# Patient Record
Sex: FEMALE | Race: BLACK OR AFRICAN AMERICAN | NOT HISPANIC OR LATINO | Employment: OTHER | URBAN - METROPOLITAN AREA
[De-identification: names, ages, dates, MRNs, and addresses within clinical notes are randomized per-mention and may not be internally consistent; named-entity substitution may affect disease eponyms.]

---

## 2022-08-20 ENCOUNTER — NURSE TRIAGE (OUTPATIENT)
Dept: ADMINISTRATIVE | Facility: CLINIC | Age: 52
End: 2022-08-20

## 2022-08-21 ENCOUNTER — OFFICE VISIT (OUTPATIENT)
Dept: URGENT CARE | Facility: CLINIC | Age: 52
End: 2022-08-21
Payer: OTHER GOVERNMENT

## 2022-08-21 ENCOUNTER — TELEPHONE (OUTPATIENT)
Dept: URGENT CARE | Facility: CLINIC | Age: 52
End: 2022-08-21

## 2022-08-21 VITALS
HEIGHT: 64 IN | HEART RATE: 84 BPM | BODY MASS INDEX: 33.29 KG/M2 | WEIGHT: 195 LBS | OXYGEN SATURATION: 97 % | SYSTOLIC BLOOD PRESSURE: 128 MMHG | TEMPERATURE: 99 F | RESPIRATION RATE: 18 BRPM | DIASTOLIC BLOOD PRESSURE: 81 MMHG

## 2022-08-21 DIAGNOSIS — Z72.0 TOBACCO USE: ICD-10-CM

## 2022-08-21 DIAGNOSIS — F17.200 NEEDS SMOKING CESSATION EDUCATION: ICD-10-CM

## 2022-08-21 DIAGNOSIS — M25.562 ACUTE PAIN OF LEFT KNEE: Primary | ICD-10-CM

## 2022-08-21 PROCEDURE — 99203 OFFICE O/P NEW LOW 30 MIN: CPT | Mod: ,,, | Performed by: FAMILY MEDICINE

## 2022-08-21 PROCEDURE — 99203 PR OFFICE/OUTPT VISIT, NEW, LEVL III, 30-44 MIN: ICD-10-PCS | Mod: ,,, | Performed by: FAMILY MEDICINE

## 2022-08-21 RX ORDER — BUSPIRONE HYDROCHLORIDE 30 MG/1
30 TABLET ORAL 2 TIMES DAILY
COMMUNITY

## 2022-08-21 RX ORDER — ROSUVASTATIN CALCIUM 10 MG/1
10 TABLET, COATED ORAL DAILY
COMMUNITY

## 2022-08-21 RX ORDER — CHOLECALCIFEROL (VITAMIN D3) 25 MCG
1000 TABLET ORAL DAILY
COMMUNITY

## 2022-08-21 RX ORDER — RAMELTEON 8 MG/1
8 TABLET ORAL DAILY
COMMUNITY

## 2022-08-21 RX ORDER — MONTELUKAST SODIUM 10 MG/1
10 TABLET ORAL DAILY
COMMUNITY

## 2022-08-21 RX ORDER — SERTRALINE HYDROCHLORIDE 200 MG/1
200 CAPSULE ORAL DAILY
COMMUNITY

## 2022-08-21 NOTE — PATIENT INSTRUCTIONS
Discussed the physical finding, concerns of contusion.  Reviewed the x-ray, no concerns of acute findings.  Except possible small effusion.  Continue is 3 to 4 times a day, knee brace.  Activities as tolerated.  Aleve or ibuprofen for pain.  Call or return to clinic for any questions.  Follow-up with primary MD    Smoking cessation education

## 2022-08-21 NOTE — TELEPHONE ENCOUNTER
Reason for Disposition   Followed a leg injury   [1] High-risk adult (e.g., age > 60 years, osteoporosis, chronic steroid use) AND [2] limping    Additional Information   Negative: Looks like a broken bone or dislocated joint (e.g., crooked or deformed)   Negative: Sounds like a life-threatening emergency to the triager   Negative: Serious injury with multiple fractures (broken bones)   Negative: [1] Major bleeding (e.g., actively dripping or spurting) AND [2] can't be stopped   Negative: Bullet wound, stabbed by knife, or other serious penetrating wound   Negative: Looks like a dislocated joint (crooked or deformed)   Negative: Can't stand (bear weight) or walk   Negative: Sounds like a life-threatening emergency to the triager   Negative: Wound looks infected   Negative: Leg pain from overuse (e.g., sports, running, physical work)   Negative: Leg pain not from an injury   Negative: Injury mainly to the hip   Negative: Injury mainly to knee   Negative: Injury mainly to foot or ankle   Negative: Skin is split open or gaping (or length > 1/2 inch or 12 mm)   Negative: [1] Bleeding AND [2] won't stop after 10 minutes of direct pressure (using correct technique)   Negative: [1] Dirt in the wound AND [2] not removed with 15 minutes of scrubbing   Negative: Sounds like a serious injury to the triager   Negative: [1] SEVERE pain (e.g. excruciating)  AND [2] not improved 2 hours after pain medicine/ice packs   Negative: Suspicious history for the injury   Negative: Large swelling or bruise > 2 inches (5 cm)    Protocols used: LEG PAIN-A-AH, LEG INJURY-A-  pt called re had L TKR in April. pt states thigh to calf having throbbing pain x 3 days. pt rates pain 6.  Pt admits she hit the front of her thigh on a chair two weeks ago. No swelling or bruising. rec to see dr within 24 hours. Pt agrees. Offered protocol advice. rec ED if sx worsen. Call back with questions

## 2022-08-21 NOTE — PROGRESS NOTES
"Subjective:       Patient ID: Brenden Hamm is a 52 y.o. female.    Vitals:  height is 5' 4" (1.626 m) and weight is 88.5 kg (195 lb). Her temperature is 98.9 °F (37.2 °C). Her blood pressure is 128/81 and her pulse is 84. Her respiration is 18 and oxygen saturation is 97%.     Chief Complaint: Knee Pain (Knee replacement in April, 3 wks ago hit top of left knee on chair. )    HPI:  52-year-old female present to clinic with concerns of hit fall mainly on left knee 2 weeks ago.  Full weight-bearing, denies tingling numbness or weakness to lower extremities.  States knee replacement left knee April 27th.  Patient is from [place].  Recently moved to [place].  Appears concerned with ongoing discomfort left knee anteriorly and superiorly    ROS  :  Constitutional : No fever, no fatigue  Neck : Negative except HPI  Respiratory : No shortness of breath, no wheezing  Cardiovascular : No chest pain  Musculoskeletal : Negative except HPI  Integumentary : No rash, no abnormal lesion  Neurological : Negative for tingling numbness and weakness  Objective:      Physical Exam    General : Alert and oriented, No apparent distress, Afebrile  Neck -: supple, Non Tender  Respiratory :Lungs are clear to auscultate, Breath sounds are equal  Cardiovascular : Normal rate, normal volume pulse bilateral  Musculoskeletal :  Left knee visible surgical scar consistent with knee replacement.  No visible bruising, mild foot less anterior knee superiorly.  No point tenderness.  Knee range of motion present.  Integumentary : Warm, Dry   Neurologic : Alert and Oriented X 4, sensations intact, motor intact  Assessment:       1. Acute pain of left knee          Plan:       Discussed the physical finding, concerns of contusion.  Reviewed the x-ray, no concerns of acute findings.  Except possible small effusion.  Continue is 3 to 4 times a day, knee brace.  Activities as tolerated.  Aleve or ibuprofen for pain.  Call or return to clinic for any " questions.  Follow-up with primary MD    Acute pain of left knee  -     X-Ray Knee 3 View Left; Future; Expected date: 08/21/2022

## 2022-08-24 DIAGNOSIS — Z01.89 ENCOUNTER FOR OTHER SPECIFIED SPECIAL EXAMINATIONS: Primary | ICD-10-CM

## 2022-09-01 ENCOUNTER — HOSPITAL ENCOUNTER (OUTPATIENT)
Dept: RADIOLOGY | Facility: HOSPITAL | Age: 52
Discharge: HOME | End: 2022-09-01
Attending: FAMILY MEDICINE
Payer: OTHER GOVERNMENT

## 2022-09-01 DIAGNOSIS — Z01.89 ENCOUNTER FOR OTHER SPECIFIED SPECIAL EXAMINATIONS: ICD-10-CM

## 2022-09-01 PROCEDURE — 77067 SCR MAMMO BI INCL CAD: CPT | Mod: TC

## 2022-09-01 PROCEDURE — 77067 MAMMO DIGITAL SCREENING BILAT WITH TOMO: ICD-10-PCS | Mod: 26,,, | Performed by: STUDENT IN AN ORGANIZED HEALTH CARE EDUCATION/TRAINING PROGRAM

## 2022-09-01 PROCEDURE — 77063 MAMMO DIGITAL SCREENING BILAT WITH TOMO: ICD-10-PCS | Mod: 26,,, | Performed by: STUDENT IN AN ORGANIZED HEALTH CARE EDUCATION/TRAINING PROGRAM

## 2022-09-01 PROCEDURE — 77063 BREAST TOMOSYNTHESIS BI: CPT | Mod: 26,,, | Performed by: STUDENT IN AN ORGANIZED HEALTH CARE EDUCATION/TRAINING PROGRAM

## 2022-09-01 PROCEDURE — 77067 SCR MAMMO BI INCL CAD: CPT | Mod: 26,,, | Performed by: STUDENT IN AN ORGANIZED HEALTH CARE EDUCATION/TRAINING PROGRAM

## 2022-11-16 DIAGNOSIS — M54.41 BILATERAL LOW BACK PAIN WITH BILATERAL SCIATICA: Primary | ICD-10-CM

## 2022-11-16 DIAGNOSIS — M54.42 BILATERAL LOW BACK PAIN WITH BILATERAL SCIATICA: Primary | ICD-10-CM

## 2022-11-16 DIAGNOSIS — M47.817 SPONDYLOSIS OF LUMBOSACRAL SPINE WITHOUT MYELOPATHY: ICD-10-CM

## 2023-01-30 ENCOUNTER — OFFICE VISIT (OUTPATIENT)
Dept: ORTHOPEDIC SURGERY | Facility: CLINIC | Age: 53
End: 2023-01-30
Payer: MEDICARE

## 2023-01-30 ENCOUNTER — HOSPITAL ENCOUNTER (OUTPATIENT)
Dept: RADIOLOGY | Facility: CLINIC | Age: 53
Discharge: HOME | End: 2023-01-30
Attending: ORTHOPAEDIC SURGERY
Payer: OTHER GOVERNMENT

## 2023-01-30 DIAGNOSIS — M54.42 BILATERAL LOW BACK PAIN WITH BILATERAL SCIATICA: ICD-10-CM

## 2023-01-30 DIAGNOSIS — M54.41 BILATERAL LOW BACK PAIN WITH BILATERAL SCIATICA: ICD-10-CM

## 2023-01-30 DIAGNOSIS — M47.817 SPONDYLOSIS OF LUMBOSACRAL SPINE WITHOUT MYELOPATHY: ICD-10-CM

## 2023-01-30 DIAGNOSIS — M51.36 DDD (DEGENERATIVE DISC DISEASE), LUMBAR: ICD-10-CM

## 2023-01-30 DIAGNOSIS — M54.9 DORSALGIA, UNSPECIFIED: Primary | ICD-10-CM

## 2023-01-30 PROCEDURE — 72110 XR LUMBAR SPINE AP AND LAT WITH FLEX/EXT: ICD-10-PCS | Mod: ,,, | Performed by: ORTHOPAEDIC SURGERY

## 2023-01-30 PROCEDURE — 99204 OFFICE O/P NEW MOD 45 MIN: CPT | Mod: S$GLB,,, | Performed by: ORTHOPAEDIC SURGERY

## 2023-01-30 PROCEDURE — 99204 PR OFFICE/OUTPT VISIT, NEW, LEVL IV, 45-59 MIN: ICD-10-PCS | Mod: S$GLB,,, | Performed by: ORTHOPAEDIC SURGERY

## 2023-01-30 PROCEDURE — 72110 X-RAY EXAM L-2 SPINE 4/>VWS: CPT | Mod: ,,, | Performed by: ORTHOPAEDIC SURGERY

## 2023-02-02 ENCOUNTER — DOCUMENTATION ONLY (OUTPATIENT)
Dept: PRIMARY CARE CLINIC | Facility: CLINIC | Age: 53
End: 2023-02-02
Payer: OTHER GOVERNMENT

## 2023-02-02 LAB
LEFT EYE DM RETINOPATHY: NEGATIVE
RIGHT EYE DM RETINOPATHY: NEGATIVE

## 2023-02-03 NOTE — PROGRESS NOTES
DATE: 2/3/2023  PATIENT: Brenden Hamm    Attending Physician: Kamlesh Gomez M.D.    CHIEF COMPLAINT:  low back pain    HISTORY:  Brenden Hamm is a 52 y.o. female  with a remote history of a lumbar spine injury 30 years ago while in the  who presents for a progressive history of left greater than right-sided low back pain.  The patient describes the pain is constant, but variable severity.  It has been particularly bad over the past 6 months since she started an exercise program.  She is tried extensive conservative treatment including lidocaine patches, epidural steroid injections 2 years ago, Tylenol, ibuprofen, heat, and Flexeril, as well as recent physical therapy.  The patient reports the pain is improved with lying down and using heat, it is worse with activities of daily living such as vacuuming, and cleaning.  She is not endorse any significant lumbar radiculopathy.     The Patient denies myelopathic symptoms such as handwriting changes or difficulty with buttons/coins/keys. Denies perineal paresthesias, bowel/bladder dysfunction.    PAST MEDICAL/SURGICAL HISTORY:  Past Medical History:   Diagnosis Date    Heart attack     Hyperlipidemia      Past Surgical History:   Procedure Laterality Date    APPENDECTOMY      BREAST SURGERY       SECTION      gastric sleeve      HYSTERECTOMY      TONSILLECTOMY      TOTAL KNEE ARTHROPLASTY Bilateral     WISDOM TOOTH EXTRACTION         Current Medications:   Current Outpatient Medications:     busPIRone (BUSPAR) 30 MG Tab, Take 30 mg by mouth 2 (two) times a day., Disp: , Rfl:     montelukast (SINGULAIR) 10 mg tablet, Take 10 mg by mouth once daily., Disp: , Rfl:     ramelteon (ROZEREM) 8 mg tablet, Take 8 mg by mouth once daily., Disp: , Rfl:     rosuvastatin (CRESTOR) 10 MG tablet, Take 10 mg by mouth once daily., Disp: , Rfl:     sertraline 200 mg Cap, Take 200 mg by mouth once daily., Disp: , Rfl:     vitamin D (VITAMIN D3) 1000 units Tab, Take  1,000 Units by mouth once daily., Disp: , Rfl:     Social History:   Social History     Socioeconomic History    Marital status: Single   Tobacco Use    Smoking status: Every Day     Packs/day: 1.00     Types: Cigarettes    Smokeless tobacco: Never    Tobacco comments:     1 pk every 3 days    Substance and Sexual Activity    Alcohol use: Never    Drug use: Never        EXAM:  There were no vitals taken for this visit.    PHYSICAL EXAMINATION:    Gait: Normal station and gait, no difficulty with toe or heel walk.   Skin: Dorsal lumbar skin negative for rashes, lesions, hairy patches and surgical scars. There is   Mild diffuse low lumbar lumbar tenderness to palpation.  Range of motion: Lumbar range of motion is acceptable.  Spinal Balance: Global saggital and coronal spinal balance acceptable, no significant for scoliosis and kyphosis.  Musculoskeletal: No pain with the range of motion of the bilateral hips. No trochanteric tenderness to palpation.  Vascular: Bilateral lower extremities warm and well perfused, Dorsalis pedis pulses 2+ bilaterally.  Neurological: Normal strength and tone in all major motor groups in the bilateral lower extremities. Normal sensation to light touch in the L2-S1 dermatomes bilaterally.  Deep tendon reflexes symmetric  1+ in the bilateral lower extremities.  Negative Babinski bilaterally. Straight leg raise negative bilaterally.    IMAGING:      Today I personally reviewed AP, Lat and Flex/Ex  upright L-spine  radiographs performed in the office today that demonstrates minimal multilevel lumbar spondylosis.    There is no height or weight on file to calculate BMI.  No results found for: HGBA1C    ASSESSMENT/PLAN:    Diagnoses and all orders for this visit:    Dorsalgia, unspecified  -     MRI Lumbar Spine Without Contrast; Future    Bilateral low back pain with bilateral sciatica  -     Ambulatory referral/consult to Orthopedics    Spondylosis of lumbosacral spine without myelopathy  -      Ambulatory referral/consult to Orthopedics      No follow-ups on file.      Today we discussed options, I recommended an MRI of her lumbar spine, I will see her back for results.

## 2023-02-28 ENCOUNTER — HOSPITAL ENCOUNTER (OUTPATIENT)
Dept: RADIOLOGY | Facility: HOSPITAL | Age: 53
Discharge: HOME | End: 2023-02-28
Attending: ORTHOPAEDIC SURGERY
Payer: MEDICARE

## 2023-02-28 DIAGNOSIS — M54.9 DORSALGIA, UNSPECIFIED: ICD-10-CM

## 2023-02-28 PROCEDURE — 72148 MRI LUMBAR SPINE W/O DYE: CPT | Mod: TC

## 2023-03-27 ENCOUNTER — OFFICE VISIT (OUTPATIENT)
Dept: ORTHOPEDIC SURGERY | Facility: CLINIC | Age: 53
End: 2023-03-27
Payer: MEDICARE

## 2023-03-27 DIAGNOSIS — M54.41 CHRONIC BILATERAL LOW BACK PAIN WITH BILATERAL SCIATICA: Primary | ICD-10-CM

## 2023-03-27 DIAGNOSIS — M54.42 CHRONIC BILATERAL LOW BACK PAIN WITH BILATERAL SCIATICA: Primary | ICD-10-CM

## 2023-03-27 DIAGNOSIS — G89.29 CHRONIC BILATERAL LOW BACK PAIN WITH BILATERAL SCIATICA: Primary | ICD-10-CM

## 2023-03-27 PROCEDURE — 99214 OFFICE O/P EST MOD 30 MIN: CPT | Mod: S$GLB,,, | Performed by: ORTHOPAEDIC SURGERY

## 2023-03-27 PROCEDURE — 99214 PR OFFICE/OUTPT VISIT, EST, LEVL IV, 30-39 MIN: ICD-10-PCS | Mod: S$GLB,,, | Performed by: ORTHOPAEDIC SURGERY

## 2023-03-27 RX ORDER — LAMOTRIGINE 100 MG/1
100 TABLET ORAL DAILY
COMMUNITY

## 2023-03-27 NOTE — PROGRESS NOTES
The patient returns for follow-up.  She has a history of left greater than right-sided low back pain and left greater than right-sided leg pain.  This is overall worse since her last visit.  She rates her pain as a 10/10, and notes that it is worse with sitting.      Today I was able to review a recent lumbar spine MRI that is most notable for L4-5 central stenosis, she is no evidence of instability on radiographs.      Impression:  Symptomatic L4-5 stenosis refractory to conservative management     Plan:  Today we discussed options, I think the patient is a reasonable candidate for a L4 laminectomy.  We will work on scheduling a date.

## 2023-03-28 DIAGNOSIS — M79.604 PAIN IN BOTH LOWER EXTREMITIES: ICD-10-CM

## 2023-03-28 DIAGNOSIS — M79.605 PAIN IN BOTH LOWER EXTREMITIES: ICD-10-CM

## 2023-03-28 DIAGNOSIS — Z01.818 PREOPERATIVE TESTING: Primary | ICD-10-CM

## 2023-03-29 ENCOUNTER — TELEPHONE (OUTPATIENT)
Dept: PREADMISSION TESTING | Facility: HOSPITAL | Age: 53
End: 2023-03-29
Payer: OTHER GOVERNMENT

## 2023-03-29 NOTE — TELEPHONE ENCOUNTER
----- Message from Deanne Carroll RN sent at 3/28/2023 10:30 AM CDT -----  Surgery 4/25  Please schedule labs, ua, and ekg.  Thanks!

## 2023-04-10 ENCOUNTER — LAB VISIT (OUTPATIENT)
Dept: LAB | Facility: HOSPITAL | Age: 53
End: 2023-04-10
Attending: ORTHOPAEDIC SURGERY
Payer: MEDICARE

## 2023-04-10 ENCOUNTER — CLINICAL SUPPORT (OUTPATIENT)
Dept: RESPIRATORY THERAPY | Facility: HOSPITAL | Age: 53
End: 2023-04-10
Attending: ORTHOPAEDIC SURGERY
Payer: MEDICARE

## 2023-04-10 DIAGNOSIS — Z01.818 PREOPERATIVE TESTING: ICD-10-CM

## 2023-04-10 LAB
APPEARANCE UR: ABNORMAL
BACTERIA #/AREA URNS AUTO: ABNORMAL /HPF
BILIRUB UR QL STRIP.AUTO: NEGATIVE MG/DL
COLOR UR AUTO: YELLOW
GLUCOSE UR QL STRIP.AUTO: NEGATIVE MG/DL
KETONES UR QL STRIP.AUTO: ABNORMAL MG/DL
LEUKOCYTE ESTERASE UR QL STRIP.AUTO: ABNORMAL UNIT/L
NITRITE UR QL STRIP.AUTO: NEGATIVE
PH UR STRIP.AUTO: 5.5 [PH]
PROT UR QL STRIP.AUTO: NEGATIVE MG/DL
RBC #/AREA URNS AUTO: ABNORMAL /HPF
RBC UR QL AUTO: NEGATIVE UNIT/L
SP GR UR STRIP.AUTO: >=1.03
SQUAMOUS #/AREA URNS AUTO: ABNORMAL /HPF
T VAGINALIS URNS QL MICRO: ABNORMAL /HPF
UROBILINOGEN UR STRIP-ACNC: 0.2 MG/DL
WBC #/AREA URNS AUTO: ABNORMAL /HPF

## 2023-04-10 PROCEDURE — 81001 URINALYSIS AUTO W/SCOPE: CPT

## 2023-04-10 PROCEDURE — 93010 ELECTROCARDIOGRAM REPORT: CPT | Mod: ,,, | Performed by: INTERNAL MEDICINE

## 2023-04-10 PROCEDURE — 93010 EKG 12-LEAD: ICD-10-PCS | Mod: ,,, | Performed by: INTERNAL MEDICINE

## 2023-04-10 PROCEDURE — 93005 ELECTROCARDIOGRAM TRACING: CPT

## 2023-04-18 ENCOUNTER — PATIENT MESSAGE (OUTPATIENT)
Dept: ADMINISTRATIVE | Facility: OTHER | Age: 53
End: 2023-04-18
Payer: OTHER GOVERNMENT

## 2023-05-22 ENCOUNTER — PATIENT MESSAGE (OUTPATIENT)
Dept: ADMINISTRATIVE | Facility: OTHER | Age: 53
End: 2023-05-22
Payer: OTHER GOVERNMENT

## 2023-05-22 ENCOUNTER — OFFICE VISIT (OUTPATIENT)
Dept: ORTHOPEDIC SURGERY | Facility: CLINIC | Age: 53
End: 2023-05-22
Payer: MEDICARE

## 2023-05-22 VITALS — HEIGHT: 64 IN | BODY MASS INDEX: 33.31 KG/M2 | WEIGHT: 195.13 LBS

## 2023-05-22 DIAGNOSIS — M48.062 LUMBAR STENOSIS WITH NEUROGENIC CLAUDICATION: Primary | ICD-10-CM

## 2023-05-22 PROCEDURE — 1159F PR MEDICATION LIST DOCUMENTED IN MEDICAL RECORD: ICD-10-PCS | Mod: CPTII,S$GLB,, | Performed by: ORTHOPAEDIC SURGERY

## 2023-05-22 PROCEDURE — 99214 PR OFFICE/OUTPT VISIT, EST, LEVL IV, 30-39 MIN: ICD-10-PCS | Mod: S$GLB,,, | Performed by: ORTHOPAEDIC SURGERY

## 2023-05-22 PROCEDURE — 3008F PR BODY MASS INDEX (BMI) DOCUMENTED: ICD-10-PCS | Mod: CPTII,S$GLB,, | Performed by: ORTHOPAEDIC SURGERY

## 2023-05-22 PROCEDURE — 99214 OFFICE O/P EST MOD 30 MIN: CPT | Mod: S$GLB,,, | Performed by: ORTHOPAEDIC SURGERY

## 2023-05-22 PROCEDURE — 1159F MED LIST DOCD IN RCRD: CPT | Mod: CPTII,S$GLB,, | Performed by: ORTHOPAEDIC SURGERY

## 2023-05-22 PROCEDURE — 3008F BODY MASS INDEX DOCD: CPT | Mod: CPTII,S$GLB,, | Performed by: ORTHOPAEDIC SURGERY

## 2023-05-22 NOTE — PROGRESS NOTES
The patient presents for preop.  She has symptomatic L4 spinal stenosis and we are planning an L4 laminectomy.    I had a sit down discussion with the patien regarding the planned procedure.  We specifically discussed the risks, benefits, and alternatives to surgery. We discussed the surgical procedure including the skin incision, laminectomy and nerve decompression: they understand the risks include but are not limited to bleeding, infection, damage to arteries, veins and nerves, recurrent stenosis, iatrogenic instability, death, paralysis, blindness, spinal fluid leak, continued or worsening pain, the possible need for more surgery in the future as well as the possibility other unforseen and unknown complications. We talked about expected hospital stay and recovery period. All questions were answered; they understand and wish to proceed.

## 2023-06-22 ENCOUNTER — TELEPHONE (OUTPATIENT)
Dept: ORTHOPEDICS | Facility: CLINIC | Age: 53
End: 2023-06-22
Payer: OTHER GOVERNMENT

## 2023-06-22 NOTE — TELEPHONE ENCOUNTER
I spoke to patient on today she will call back to let me know when the [facility] is  [facility]. Once she [facility] he cardi  she will call us to schedule an appointment. She want to discuss surgery.

## 2023-07-17 ENCOUNTER — OFFICE VISIT (OUTPATIENT)
Dept: ORTHOPEDIC SURGERY | Facility: CLINIC | Age: 53
End: 2023-07-17
Payer: OTHER GOVERNMENT

## 2023-07-17 VITALS — HEIGHT: 64 IN | WEIGHT: 195.13 LBS | BODY MASS INDEX: 33.31 KG/M2

## 2023-07-17 DIAGNOSIS — Z98.890 S/P SPINAL SURGERY: Primary | ICD-10-CM

## 2023-07-17 DIAGNOSIS — M48.062 SPINAL STENOSIS OF LUMBAR REGION WITH NEUROGENIC CLAUDICATION: Primary | ICD-10-CM

## 2023-07-17 PROCEDURE — 99212 OFFICE O/P EST SF 10 MIN: CPT | Mod: S$GLB,,, | Performed by: ORTHOPAEDIC SURGERY

## 2023-07-17 PROCEDURE — 99212 PR OFFICE/OUTPT VISIT, EST, LEVL II, 10-19 MIN: ICD-10-PCS | Mod: S$GLB,,, | Performed by: ORTHOPAEDIC SURGERY

## 2023-07-17 RX ORDER — CARVEDILOL 3.12 MG/1
3.12 TABLET ORAL 2 TIMES DAILY
COMMUNITY

## 2023-07-17 NOTE — PROGRESS NOTES
The patient returns for follow-up.  We are planning an L4 laminectomy but this was postponed because of cardiac evaluation resulting in an angioplasty but no stent.  The patient is now ready to schedule her L4 laminectomy.  I have given her a date of August 22nd.

## 2023-07-27 ENCOUNTER — TELEPHONE (OUTPATIENT)
Dept: PREADMISSION TESTING | Facility: HOSPITAL | Age: 53
End: 2023-07-27
Payer: OTHER GOVERNMENT

## 2023-07-27 DIAGNOSIS — Z01.818 PREOPERATIVE TESTING: Primary | ICD-10-CM

## 2023-07-27 NOTE — PRE-PROCEDURE INSTRUCTIONS
Patient stated has not had any problem with anesthesia in the past. Will need labs and ua. Our  will call to set up these appts. Will need medical clearance form your PCP,Dr. Jani Rutherford. She said she saw him and gort her clearance after 7/17. Will need Cardiac clearance from Dr.David Matthews. She said she had a [facility]  and got her clearance on 7/17. I will ask Dr. Matthews for ASA 81 mg instructions.    Hold other aspirin containing products, nsaids(aleve, advil, motrin, ibuprofen, naprosyn, naproxen, voltaren, diclofenac, Lodine, Etodolac), vitamins ( Vitamin D) and supplements one week prior to surgery.        May take Tylenol.   Await instructions from Dr. Williams Matthews for Aspirin 81 mg.  Medication instructions given:   aspirin (ECOTRIN) 81 MG EC tablet       Sig: Take 81 mg by mouth once daily.   Class: Historical Med   Route: Oral   busPIRone (BUSPAR) 30 MG Tab       Sig: Take 30 mg by mouth 2 (two) times a day.   Class: Historical Med   Route: Memo Carroll RN 7/27/2023 11:01 AM   TAKE IN AM OF SURGERY.                  carvediloL (COREG) 3.125 MG tablet       Sig: Take 3.125 mg by mouth 2 (two) times daily.   Class: Historical Med   Route: Memo Carroll RN 7/27/2023 11:01 AM   TAKE IN AM OF SURGERY.              etodolac (LODINE) 200 MG Cap       Sig: Take 200 mg by mouth 2 (two) times daily.   Class: Historical Med   Route: Memo Carroll RN 7/27/2023 11:01 AM   HOLD ONE WEEK PRIOR TO SURGERY.                 lamoTRIgine (LAMICTAL) 100 MG tablet       Sig: Take 100 mg by mouth once daily.   Class: Historical Med   Route: Memo Carroll RN 7/27/2023 11:02 AM   TAKE IN AM OF SURGERY.                montelukast (SINGULAIR) 10 mg tablet       Sig: Take 10 mg by mouth once daily.   Class: Historical Med   Route: Memo Carroll RN 7/27/2023 11:02 AM   TAKE THE NIGHT BEFORE SURGERY.                  ramelteon (ROZEREM) 8 mg tablet       Sig: Take 8 mg by mouth once  daily.   Class: Historical Med   Route: Memo Carroll RN 7/27/2023 11:02 AM   TAKE THE NIGHT BEFORE SURGERY.                 rosuvastatin (CRESTOR) 10 MG tablet       Sig: Take 10 mg by mouth once daily.   Class: Historical Med   Route: Memo Carroll RN 7/27/2023 11:02 AM   TAKE IN AM OF SURGERY.                   sertraline 200 mg Cap       Sig: Take 200 mg by mouth once daily.   Class: Historical Med   Route: Memo Carroll RN 7/27/2023 11:02 AM   TAKE IN AM OF SURGERY.                   traMADoL (ULTRAM) 50 mg tablet 60 tablet 0 4/21/2023    Sig: Take 1 tablet (50 mg total) by mouth every 6 (six) hours.   Route: Memo Carroll RN 7/27/2023 11:03 AM   HOLD IN AM OF SURGERY.             vitamin D (VITAMIN D3) 1000 units Tab       Sig: Take 1,000 Units by mouth once daily.   Class: Historical Med   Route: Memo Carroll RN 7/27/2023 11:00 AM   HOLD ONE WEEK PRIOR TO SURGERY.             Your surgery has been scheduled for:__Tuesday 8/22/2023________________________________________  [facility] ( [place]) [phone]  You should report to:  ____[facility], located on the [place] side of the first floor of the           [facility] ([phone])  _x___[facility], located on the [facility] side of the            Second floor of the [facility] ([phone])  ____3rd Floor [facility] located on the [facility] side of the [facility] [phone]  Please Note   Tell your doctor if you take Aspirin, products containing Aspirin, herbal medications  or blood thinners, such as Coumadin, Ticlid, or Plavix.  (Consult your provider regarding holding or stopping before surgery).  Arrange for someone to drive you home following surgery.  You will not be allowed to leave the surgical facility alone or drive yourself home following sedation and anesthesia.  Before Surgery  Stop taking all  vitamins/ herbal medications 14days prior to surgery  No Motrin/Advil (Ibuprofen) 7days before surgery  No Aleve (Naproxen) 7 days before surgery  Stop Taking Asprin, products containing Asprin _____days before surgery  Stop taking blood thinners_______days before surgery  No Goody's/BC  Powder 7 days before surgery  Refrain from drinking alcoholic beverages for 24hours before and after surgery  Stop or limit smoking _____7____days before surgery. No smoking the day of surgery.  You may take Tylenol for pain  Night before Surgery  Nothing to eat or drink after midnight.   Take a shower or bath (shower is recommended).  Bathe with Hibiclens soap or an antibacterial soap from the neck down.  If not supplied by your surgeon, hibiclens soap will need to be purchased over the counter in pharmacy.  Rinse soap off thoroughly.  Shampoo your hair with your regular shampoo  The Day of Surgery  NOTHING TO  DRINK 2 hours before arrival time. If you are told to take medication on the morning of surgery, it may be taken with a sip of water.   Take another bath or shower with hibiclens or any antibacterial soap, to reduce the chance of infection.  Take heart and blood pressure medications with a small sip of water, as advised by the perioperative team.  Do not take fluid pills  You may brush your teeth and rinse your mouth, but do not swall any additional water.   Do not apply perfumes, powder, body lotions or deodorant on the day of surgery.  Nail polish should be removed.  Do not wear makeup or moisturizer  Wear comfortable clothes, such as a button front shirt and loose fitting pants.  Leave all jewelry, including body piercings, and valuables at home.    Bring any devices you will neeed after surgery such as crutches or canes.  If you have sleep apnea, please bring your CPAP machine  In the event that your physical condition changes including the onset of a cold or respiratory illness, or if you have to delay or cancel your  surgery, please notify your surgeon.   Stated knows where the surgery center is located. Verbalizes undertanding.Also sent preop and med instructions to [facility] portal.

## 2023-07-27 NOTE — ANESTHESIA PAT ROS NOTE
07/27/2023  Brenden Mccauley is a 53 y.o., female.      Pre-op Assessment          Review of Systems  Anesthesia Hx:  No problems with previous Anesthesia             Denies Family Hx of Anesthesia complications.    Denies Personal Hx of Anesthesia complications.                    Social:  Smoker, No Alcohol Use QUIT SMOKING 2 MONTHS AGO PER PATIENT      Hematology/Oncology:    Oncology Normal    -- Anemia:                                  EENT/Dental:  EENT/Dental Normal           Cardiovascular:            Denies Angina.     hyperlipidemia     Functional Capacity 4 METS, ABLE TO CLIMB 2 FLIGHTS OF STAIRS   Coronary Artery Disease:          Hx of Myocardial Infarction (2012)                  Hypertension (H/O HTN)         Pulmonary:       Denies Shortness of breath.  Denies Recent URI.      Obstructive Sleep Apnea (PIETER), CPAP used.           Renal/:  Renal/ Normal                 Hepatic/GI:    Esophageal / Stomach Disorders Gerd          Musculoskeletal:   Musculoskeletal General/Symptoms:  Functional capacity is ambulatory without assistance.          Lumbar Spine Disorders   Neurological:   Neuro Symptoms of pain OF LOWER BACK AND DELIA SCIATICA                           Endocrine:        Metabolic Disorders, Obesity / BMI > 30  Psych:     Sleep Disorder and Insomnia. Anxiety Disorder.      Psychotic Disorder, Bipolar disorder and Post-traumatic Stress Disorder.  Sleep Disorder and Insomnia.           Anesthesia Assessment: Preoperative EQUATION    Planned Procedure: Procedure(s) (LRB):  LAMINECTOMY, SPINE, LUMBAR L4 SNS:SSEP/EMG (N/A)  Requested Anesthesia Type:General  Surgeon: Kamlesh Gomez MD  Service: Neurosurgery  Known or anticipated Date of Surgery:8/22/2023    Surgeon notes: reviewed    Electronic QUestionnaire Assessment completed via nurse interview with patient.         Triage considerations:     The patient has no apparent active cardiac condition (No unstable coronary Syndrome such as severe unstable angina or recent [<1 month] myocardial infarction, decompensated CHF, severe valvular   disease or significant arrhythmia)    Previous anesthesia records:No problems and Not available    ** H/O GASTRIC SLEEVE**    Last PCP note: within 3 months , outside [facility]   Subspecialty notes: Ortho, Spine Service    Other important co-morbidities: GERD, HLD, Obesity, PIETER, Smoker, and H/O HTN       Tests already available:  Available tests,  within 3 months , 3-6 months ago , 6-12 months ago , within [facility] .    6/12/2023 [facility], 6/9/2023 CBC, CMP, 4/10/2023 EKG, 2/28/2023 MRI LUMBAR SPINE W/O CONTRAST, 1/30/2023 XRAY LUMBAR SPINE AP/LAT W F/E               Instructions given. (See in Nurse's note)    Optimization:  Anesthesia Preop Clinic Assessment not Indicated for this surgery    Medical Opinion Indicated       Sub-specialist consult indicated:   Cardiology       Plan:    Testing:  PT/INR, PTT, T&S, and UA        Consultation:Patient's PCP for a statement of optimization      Patient  has previously scheduled Medical Appointment:NONE    Navigation: Tests Scheduled. TBD             Consults scheduled.TBD             Results will be tracked by Preop Clinic.  8/15 Labs resulted and noted by Dr.Colleen Kumar.   UA and Micro UA resulted.( Dr. Kumar instructed to send these results to PCP for Trichomnas and possible UTI for treatment.)Faxed to Dr. Jani Rutherford. Urine C&S in process.     8/16 Urine C&S: Multiple organisms present indicate probable contamination. Recommend recollection. Repeat UA scheduled for 8/17.  8/18 Repeat UA resulted and noted by Dr. Beny Caal.  8/18 [facility] on 6/12/2023: Summary:   1.  Mild nonobstructive coronary disease.   2.  Normal left ventricular end-diastolic pressure.   8/18  Cardiac clearance given by Dr. Williams Matthews in note dated 6/29: Patient is considered  acceptable risk for an intermediate risk noncardiac surgery. Ok to hold ASA 7 days prior to procedure. Resume when ok with neurosurgery.(   in media uploaded on 8/7/2023)   8/22 Medical clearance given by Dr. aJni Rutherford on 8/21. ( Will be scanned to media)  Deanne Carroll RN BSN

## 2023-08-15 ENCOUNTER — LAB VISIT (OUTPATIENT)
Dept: LAB | Facility: HOSPITAL | Age: 53
End: 2023-08-15
Attending: ORTHOPAEDIC SURGERY
Payer: OTHER GOVERNMENT

## 2023-08-15 DIAGNOSIS — Z01.818 PREOPERATIVE TESTING: ICD-10-CM

## 2023-08-15 LAB
APPEARANCE UR: ABNORMAL
BACTERIA #/AREA URNS AUTO: ABNORMAL /HPF
BILIRUB UR QL STRIP.AUTO: NEGATIVE
COLOR UR: YELLOW
GLUCOSE UR QL STRIP.AUTO: NEGATIVE
KETONES UR QL STRIP.AUTO: NEGATIVE
LEUKOCYTE ESTERASE UR QL STRIP.AUTO: ABNORMAL
NITRITE UR QL STRIP.AUTO: NEGATIVE
PH UR STRIP.AUTO: 5.5 [PH]
PROT UR QL STRIP.AUTO: NEGATIVE
RBC #/AREA URNS AUTO: ABNORMAL /HPF
RBC UR QL AUTO: NEGATIVE
SP GR UR STRIP.AUTO: 1.02
SQUAMOUS #/AREA URNS AUTO: ABNORMAL /HPF
T VAGINALIS URNS QL MICRO: ABNORMAL /HPF
UROBILINOGEN UR STRIP-ACNC: 0.2
WBC #/AREA URNS AUTO: ABNORMAL /HPF

## 2023-08-15 PROCEDURE — 81001 URINALYSIS AUTO W/SCOPE: CPT

## 2023-08-15 PROCEDURE — 87088 URINE BACTERIA CULTURE: CPT

## 2023-08-16 ENCOUNTER — TELEPHONE (OUTPATIENT)
Dept: PREADMISSION TESTING | Facility: HOSPITAL | Age: 53
End: 2023-08-16
Payer: OTHER GOVERNMENT

## 2023-08-16 DIAGNOSIS — Z01.818 PREOPERATIVE TESTING: Primary | ICD-10-CM

## 2023-08-16 LAB
BACTERIA UR CULT: ABNORMAL

## 2023-08-17 ENCOUNTER — LAB VISIT (OUTPATIENT)
Dept: LAB | Facility: HOSPITAL | Age: 53
End: 2023-08-17
Attending: ANESTHESIOLOGY
Payer: OTHER GOVERNMENT

## 2023-08-17 DIAGNOSIS — Z01.818 PREOPERATIVE TESTING: ICD-10-CM

## 2023-08-17 LAB
APPEARANCE UR: ABNORMAL
BACTERIA #/AREA URNS AUTO: ABNORMAL /HPF
BILIRUB UR QL STRIP.AUTO: ABNORMAL
COLOR UR: YELLOW
GLUCOSE UR QL STRIP.AUTO: NEGATIVE
KETONES UR QL STRIP.AUTO: NEGATIVE
LEUKOCYTE ESTERASE UR QL STRIP.AUTO: ABNORMAL
NITRITE UR QL STRIP.AUTO: NEGATIVE
PH UR STRIP.AUTO: 6 [PH]
PROT UR QL STRIP.AUTO: 30
RBC #/AREA URNS AUTO: ABNORMAL /HPF
RBC UR QL AUTO: ABNORMAL
SP GR UR STRIP.AUTO: >=1.03
SQUAMOUS #/AREA URNS AUTO: ABNORMAL /HPF
T VAGINALIS URNS QL MICRO: ABNORMAL /HPF
UROBILINOGEN UR STRIP-ACNC: 1
WBC #/AREA URNS AUTO: ABNORMAL /HPF

## 2023-08-17 PROCEDURE — 81001 URINALYSIS AUTO W/SCOPE: CPT

## 2023-08-18 NOTE — PRE-PROCEDURE INSTRUCTIONS
Spoke with patient. I informed her that we have the cardiac clearance from  with instructions to hold ASA  7 days prior to surgery. She said her last dose of ASA 81 was 8/14. Awaiting medical clearance from Dr. Jani Rutherford.She said she called his office today and left a message,  but it is closed half a day. She said she would call his office back on Monday .  Verbalizes understanding.

## 2023-08-21 ENCOUNTER — ANESTHESIA EVENT (OUTPATIENT)
Dept: SURGERY | Facility: HOSPITAL | Age: 53
End: 2023-08-21
Payer: MEDICARE

## 2023-08-21 ENCOUNTER — TELEPHONE (OUTPATIENT)
Dept: ORTHOPEDICS | Facility: CLINIC | Age: 53
End: 2023-08-21
Payer: OTHER GOVERNMENT

## 2023-08-21 PROCEDURE — 27201037 HC PRESSURE MONITORING SET UP

## 2023-08-21 NOTE — ANESTHESIA PREPROCEDURE EVALUATION
[facility]  Anesthesia Pre-Operative Evaluation         Patient Name/: Brenden Mccauley, 1970  MRN: [id number]    SUBJECTIVE:     Pre-operative evaluation for Procedure(s) (LRB):  LAMINECTOMY, SPINE, LUMBAR L4 SNS:SSEP/EMG (N/A)     2023    Brenden Mccauley is a 53 y.o. female w/ a significant PMHx of GERD, HTN, HLD, obesity, PIETER (CPAP), smoker, and lumbosacral sponylosis who presents for laminectomy with Dr Gomez.     Recent C (2023) with evidence of nonobstructive coronary disease and normal LVEDP.     Patient now presents for the above procedure(s).    ________________________________________  ECHO: No results found for this or any previous visit.    ________________________________________    Prev airway: None documented.    LDA: None documented.     Drips: None documented.      There is no problem list on file for this patient.      Review of patient's allergies indicates:   Allergen Reactions    Tomato (solanum lycopersicum) Hives and Swelling    Latex, natural rubber Hives       Current Inpatient Medications:       No current facility-administered medications on file prior to encounter.     Current Outpatient Medications on File Prior to Encounter   Medication Sig Dispense Refill    aspirin (ECOTRIN) 81 MG EC tablet Take 81 mg by mouth once daily.      busPIRone (BUSPAR) 30 MG Tab Take 30 mg by mouth 2 (two) times a day.      carvediloL (COREG) 3.125 MG tablet Take 3.125 mg by mouth 2 (two) times daily.      etodolac (LODINE) 200 MG Cap Take 200 mg by mouth 2 (two) times daily.      lamoTRIgine (LAMICTAL) 100 MG tablet Take 100 mg by mouth once daily.      montelukast (SINGULAIR) 10 mg tablet Take 10 mg by mouth once daily.      ramelteon (ROZEREM) 8 mg tablet Take 8 mg by mouth once daily.      rosuvastatin (CRESTOR) 10 MG tablet Take 10 mg by mouth once daily.      sertraline 200 mg Cap Take 200 mg by mouth once daily.       traMADoL (ULTRAM) 50 mg tablet Take 1 tablet (50 mg total) by mouth every 6 (six) hours. 60 tablet 0    vitamin D (VITAMIN D3) 1000 units Tab Take 1,000 Units by mouth once daily.         Past Surgical History:   Procedure Laterality Date    APPENDECTOMY      BREAST SURGERY       SECTION      gastric sleeve      HYSTERECTOMY      LUMBAR LAMINECTOMY N/A 2023    Procedure: LAMINECTOMY, SPINE, LUMBAR L4 SNS:SSEP/EMG;  Surgeon: Kamlesh Gomez MD;  Location: [facility];  Service: Orthopedics;  Laterality: N/A;    TONSILLECTOMY      TOTAL KNEE ARTHROPLASTY Bilateral     WISDOM TOOTH EXTRACTION         Social History:  Tobacco Use: High Risk (2023)    Patient History     Smoking Tobacco Use: Every Day     Smokeless Tobacco Use: Never     Passive Exposure: Not on file       Alcohol Use: Not on file       OBJECTIVE:     Vital Signs Range:  BMI Readings from Last 1 Encounters:   23 33.49 kg/m²               Significant Labs:        Component Value Date/Time    WBC 7.8 2023 1107    HGB 10.4 (L) 2023 1107    HCT 32.5 (L) 2023 1107     2023 1107     04/10/2023 1232    K 4.0 04/10/2023 1232    CO2 25 04/10/2023 1232    BUN 10.2 04/10/2023 1232    CREATININE 0.82 04/10/2023 1232    CALCIUM 9.6 04/10/2023 1232    INR 1.0 (L) 08/15/2023 0916    HGBA1C 5.6 2023 1107        Please see Results Review for additional labs.     Diagnostic Studies: No relevant studies.    EKG:   Results for orders placed or performed in visit on 04/10/23   EKG 12-lead    Collection Time: 04/10/23 11:29 AM    Narrative    Test Reason : Z01.818,    Vent. Rate : 074 BPM     Atrial Rate : 074 BPM     P-R Int : 128 ms          QRS Dur : 086 ms      QT Int : 392 ms       P-R-T Axes : 064 038 026 degrees     QTc Int : 435 ms    Normal sinus rhythm  Normal ECG  No previous ECGs available  Confirmed by Prakash EDGE, Adolfo ([id number]) on 2023 11:05:30 AM    Referred By: JAMAAL WHITE            Confirmed By:Adolfo Randall MD       ECHO:  See subjective, if available.      ASSESSMENT/PLAN:           Pre-op Assessment    I have reviewed the Patient Summary Reports.     I have reviewed the Nursing Notes. I have reviewed the NPO Status.   I have reviewed the Medications.     Review of Systems  Anesthesia Hx:  No problems with previous Anesthesia  History of prior surgery of interest to airway management or planning: Denies Family Hx of Anesthesia complications.   Denies Personal Hx of Anesthesia complications.   Social:  Smoker, No Alcohol Use QUIT SMOKING 2 MONTHS AGO PER PATIENT   Hematology/Oncology:     Oncology Normal    -- Anemia:   EENT/Dental:EENT/Dental Normal   Cardiovascular:   Past MI   Denies Angina. hyperlipidemia  Functional Capacity 4 METS, ABLE TO CLIMB 2 FLIGHTS OF STAIRS  Coronary Artery Disease: Hx of Myocardial Infarction (2012)  Hypertension (H/O HTN)    Pulmonary:   Denies Shortness of breath.  Denies Recent URI.  Obstructive Sleep Apnea (PIETER), CPAP used.   Renal/:  Renal/ Normal     Hepatic/GI:  Esophageal / Stomach Disorders Gerd    Musculoskeletal:  Musculoskeletal General/Symptoms: Functional capacity is ambulatory without assistance.  Lumbar Spine Disorders   Neurological:  Neuro Symptoms of pain OF LOWER BACK AND DELIA SCIATICA  Endocrine:  Metabolic Disorders, Obesity / BMI > 30  Psych:   Sleep Disorder and Insomnia. Anxiety Disorder.  Psychotic Disorder, Bipolar disorder and Post-traumatic Stress Disorder.  Sleep Disorder and Insomnia.           Anesthesia Plan  Type of Anesthesia, risks & benefits discussed:    Anesthesia Type: Gen ETT  Intra-op Monitoring Plan: Standard ASA Monitors  Post Op Pain Control Plan: multimodal analgesia and IV/PO Opioids PRN  Induction:  IV  Airway Plan: Direct and Video  Informed Consent: Informed consent signed with the Patient and all parties understand the risks and agree with anesthesia plan.  All questions answered.   ASA Score: 3  Day of  Surgery Review of History & Physical: H&P Update referred to the surgeon/provider.    Ready For Surgery From Anesthesia Perspective.     .

## 2023-08-21 NOTE — TELEPHONE ENCOUNTER
----- Message from Blanche Ruggiero sent at 8/21/2023  1:27 PM CDT -----  Regarding: pt advice  Contact: [phone]  Brenden Mccauley calling regarding Patient Advice (message) for #requesting a call back regarding what anti-bacterial soap she needs to bathe with for her procedure tomorrow 8.22. Please call

## 2023-08-21 NOTE — H&P
H&P  Orthopaedics    SUBJECTIVE:     History of Present Illness:  Brenden Hamm is a 52 y.o. female  with a remote history of a lumbar spine injury 30 years ago while in the  who presents for a progressive history of left greater than right-sided low back pain.  The patient describes the pain is constant, but variable severity.  It has been particularly bad over the past 6 months since she started an exercise program.  She is tried extensive conservative treatment including lidocaine patches, epidural steroid injections 2 years ago, Tylenol, ibuprofen, heat, and Flexeril, as well as recent physical therapy.  The patient reports the pain is improved with lying down and using heat, it is worse with activities of daily living such as vacuuming, and cleaning.  She is not endorse any significant lumbar radiculopathy.      The Patient denies myelopathic symptoms such as handwriting changes or difficulty with buttons/coins/keys. Denies perineal paresthesias, bowel/bladder dysfunction.    Review of patient's allergies indicates:   Allergen Reactions    Tomato (solanum lycopersicum) Hives and Swelling    Latex, natural rubber Hives       Past Medical History:   Diagnosis Date    Heart attack     Hyperlipidemia      Past Surgical History:   Procedure Laterality Date    APPENDECTOMY      BREAST SURGERY       SECTION      gastric sleeve      HYSTERECTOMY      LUMBAR LAMINECTOMY N/A 2023    Procedure: LAMINECTOMY, SPINE, LUMBAR L4 SNS:SSEP/EMG;  Surgeon: Kamlesh Gomez MD;  Location: [facility];  Service: Orthopedics;  Laterality: N/A;    TONSILLECTOMY      TOTAL KNEE ARTHROPLASTY Bilateral     WISDOM TOOTH EXTRACTION       Family History   Problem Relation Age of Onset    Diabetes type I Mother     Multiple myeloma Mother     Alzheimer's disease Father             No Known Problems Sister     No Known Problems Brother      Social History     Tobacco Use    Smoking status: Every Day     Current  packs/day: 1.00     Types: Cigarettes    Smokeless tobacco: Never    Tobacco comments:     1 pk every 3 days    Substance Use Topics    Alcohol use: Never    Drug use: Never        Review of Systems:  Patient denies constitutional symptoms, cardiac symptoms, respiratory symptoms, GI symptoms.  The remainder of the musculoskeletal ROS is included in the HPI.      OBJECTIVE:     Physical Exam:  Gen:  No acute distress  CV:  Peripherally well-perfused.  Pulses 2+ bilaterally.  Lungs:  Normal respiratory effort.  Abdomen:  Soft, non-tender, non-distended  Head/Neck:  Normocephalic.  Atraumatic. No TTP, AROM and PROM intact without pain  Neuro:  CN intact without deficit, SILT throughout B/L Upper & Lower Extremities      MSK:  Gait: Normal station and gait, no difficulty with toe or heel walk.   Skin: Dorsal lumbar skin negative for rashes, lesions, hairy patches and surgical scars. There is   Mild diffuse low lumbar lumbar tenderness to palpation.  Range of motion: Lumbar range of motion is acceptable.  Spinal Balance: Global saggital and coronal spinal balance acceptable, no significant for scoliosis and kyphosis.  Musculoskeletal: No pain with the range of motion of the bilateral hips. No trochanteric tenderness to palpation.  Vascular: Bilateral lower extremities warm and well perfused, Dorsalis pedis pulses 2+ bilaterally.  Neurological: Normal strength and tone in all major motor groups in the bilateral lower extremities. Normal sensation to light touch in the L2-S1 dermatomes bilaterally.  Deep tendon reflexes symmetric  1+ in the bilateral lower extremities.  Negative Babinski bilaterally. Straight leg raise negative bilaterally.      Diagnostic Results:  lumbar spine MRI that is most notable for L4-5 central stenosis, she is no evidence of instability on radiographs    ASSESSMENT/PLAN:     A/P: Leandroprem Franklin Parisa is a 53 y.o. with symptomatic spinal stenosis.       Plan:  - to OR for  L4 laminectomy        John Perea MD  Orthopaedic Surgery Resident

## 2023-08-21 NOTE — TELEPHONE ENCOUNTER
LVM informing pt it is approved----- Message from Shaina Pavon PA-C sent at 8/21/2023  2:09 PM CDT -----  Yes    ----- Message -----  From: Lobo Ortiz MA  Sent: 8/21/2023   1:26 PM CDT  To: Shaina Pavon PA-C    Is it approved  ----- Message -----  From: Lula Leary  Sent: 8/21/2023   1:03 PM CDT  To: Patricia Whitlock Staff    [facility] calling regarding Patient Advice (message) Return a call to nurse April for a clearance for pt to have surgery on tomorrow [phone] [place]

## 2023-08-22 ENCOUNTER — HOSPITAL ENCOUNTER (OUTPATIENT)
Facility: HOSPITAL | Age: 53
Discharge: HOME | End: 2023-08-22
Attending: ORTHOPAEDIC SURGERY | Admitting: ORTHOPAEDIC SURGERY
Payer: MEDICARE

## 2023-08-22 ENCOUNTER — ANESTHESIA (OUTPATIENT)
Dept: SURGERY | Facility: HOSPITAL | Age: 53
End: 2023-08-22
Payer: MEDICARE

## 2023-08-22 VITALS
TEMPERATURE: 98 F | WEIGHT: 191 LBS | HEIGHT: 64 IN | HEART RATE: 68 BPM | BODY MASS INDEX: 32.61 KG/M2 | OXYGEN SATURATION: 94 % | RESPIRATION RATE: 16 BRPM | DIASTOLIC BLOOD PRESSURE: 61 MMHG | SYSTOLIC BLOOD PRESSURE: 125 MMHG

## 2023-08-22 DIAGNOSIS — M48.061 LUMBAR SPINAL STENOSIS: ICD-10-CM

## 2023-08-22 LAB
ABO + RH BLD: NORMAL
BLD GP AB SCN CELLS X3 SERPL QL: NORMAL
POCT GLUCOSE: 90 MG/DL (ref 70–110)
SPECIMEN OUTDATE: NORMAL

## 2023-08-22 PROCEDURE — 25000003 PHARM REV CODE 250: Performed by: STUDENT IN AN ORGANIZED HEALTH CARE EDUCATION/TRAINING PROGRAM

## 2023-08-22 PROCEDURE — 25000003 PHARM REV CODE 250: Performed by: ORTHOPAEDIC SURGERY

## 2023-08-22 PROCEDURE — 63005: ICD-10-PCS | Mod: ,,, | Performed by: ORTHOPAEDIC SURGERY

## 2023-08-22 PROCEDURE — 63600175 PHARM REV CODE 636 W HCPCS: Performed by: STUDENT IN AN ORGANIZED HEALTH CARE EDUCATION/TRAINING PROGRAM

## 2023-08-22 PROCEDURE — 37000009 HC ANESTHESIA EA ADD 15 MINS: Performed by: ORTHOPAEDIC SURGERY

## 2023-08-22 PROCEDURE — 27201423 OPTIME MED/SURG SUP & DEVICES STERILE SUPPLY: Performed by: ORTHOPAEDIC SURGERY

## 2023-08-22 PROCEDURE — 63005 REMOVE SPINE LAMINA 1/2 LMBR: CPT | Mod: ,,, | Performed by: ORTHOPAEDIC SURGERY

## 2023-08-22 PROCEDURE — 71000044 HC DOSC ROUTINE RECOVERY FIRST HOUR: Performed by: ORTHOPAEDIC SURGERY

## 2023-08-22 PROCEDURE — 71000016 HC POSTOP RECOV ADDL HR: Performed by: ORTHOPAEDIC SURGERY

## 2023-08-22 PROCEDURE — D9220A PRA ANESTHESIA: ICD-10-PCS | Mod: ,,, | Performed by: ANESTHESIOLOGY

## 2023-08-22 PROCEDURE — 63600175 PHARM REV CODE 636 W HCPCS: Performed by: ORTHOPAEDIC SURGERY

## 2023-08-22 PROCEDURE — 82962 GLUCOSE BLOOD TEST: CPT | Performed by: ORTHOPAEDIC SURGERY

## 2023-08-22 PROCEDURE — 71000015 HC POSTOP RECOV 1ST HR: Performed by: ORTHOPAEDIC SURGERY

## 2023-08-22 PROCEDURE — D9220A PRA ANESTHESIA: Mod: ,,, | Performed by: ANESTHESIOLOGY

## 2023-08-22 PROCEDURE — 86900 BLOOD TYPING SEROLOGIC ABO: CPT | Performed by: STUDENT IN AN ORGANIZED HEALTH CARE EDUCATION/TRAINING PROGRAM

## 2023-08-22 PROCEDURE — 36000711: Performed by: ORTHOPAEDIC SURGERY

## 2023-08-22 PROCEDURE — 37000008 HC ANESTHESIA 1ST 15 MINUTES: Performed by: ORTHOPAEDIC SURGERY

## 2023-08-22 PROCEDURE — 36000710: Performed by: ORTHOPAEDIC SURGERY

## 2023-08-22 RX ORDER — PROPOFOL 10 MG/ML
VIAL (ML) INTRAVENOUS
Status: DISCONTINUED | OUTPATIENT
Start: 2023-08-22 | End: 2023-08-22

## 2023-08-22 RX ORDER — ACETAMINOPHEN 10 MG/ML
INJECTION, SOLUTION INTRAVENOUS
Status: DISCONTINUED | OUTPATIENT
Start: 2023-08-22 | End: 2023-08-22

## 2023-08-22 RX ORDER — FENTANYL CITRATE 50 UG/ML
25 INJECTION, SOLUTION INTRAMUSCULAR; INTRAVENOUS EVERY 5 MIN PRN
Status: DISCONTINUED | OUTPATIENT
Start: 2023-08-22 | End: 2023-08-22 | Stop reason: HOSPADM

## 2023-08-22 RX ORDER — ROCURONIUM BROMIDE 10 MG/ML
INJECTION, SOLUTION INTRAVENOUS
Status: DISCONTINUED | OUTPATIENT
Start: 2023-08-22 | End: 2023-08-22

## 2023-08-22 RX ORDER — ACETAMINOPHEN 500 MG
1000 TABLET ORAL EVERY 8 HOURS
Qty: 84 TABLET | Refills: 0 | Status: SHIPPED | OUTPATIENT
Start: 2023-08-22 | End: 2023-09-05

## 2023-08-22 RX ORDER — SUCCINYLCHOLINE CHLORIDE 20 MG/ML
INJECTION INTRAMUSCULAR; INTRAVENOUS
Status: DISCONTINUED | OUTPATIENT
Start: 2023-08-22 | End: 2023-08-22

## 2023-08-22 RX ORDER — PHENYLEPHRINE HYDROCHLORIDE 10 MG/ML
INJECTION INTRAVENOUS
Status: DISCONTINUED | OUTPATIENT
Start: 2023-08-22 | End: 2023-08-22

## 2023-08-22 RX ORDER — DEXAMETHASONE SODIUM PHOSPHATE 4 MG/ML
INJECTION, SOLUTION INTRA-ARTICULAR; INTRALESIONAL; INTRAMUSCULAR; INTRAVENOUS; SOFT TISSUE
Status: DISCONTINUED | OUTPATIENT
Start: 2023-08-22 | End: 2023-08-22

## 2023-08-22 RX ORDER — MUPIROCIN 20 MG/G
OINTMENT TOPICAL
Status: DISCONTINUED | OUTPATIENT
Start: 2023-08-22 | End: 2023-08-22 | Stop reason: HOSPADM

## 2023-08-22 RX ORDER — KETAMINE HCL IN 0.9 % NACL 50 MG/5 ML
SYRINGE (ML) INTRAVENOUS
Status: DISCONTINUED | OUTPATIENT
Start: 2023-08-22 | End: 2023-08-22

## 2023-08-22 RX ORDER — ONDANSETRON 2 MG/ML
INJECTION INTRAMUSCULAR; INTRAVENOUS
Status: DISCONTINUED | OUTPATIENT
Start: 2023-08-22 | End: 2023-08-22

## 2023-08-22 RX ORDER — BUPIVACAINE HYDROCHLORIDE AND EPINEPHRINE 5; 5 MG/ML; UG/ML
INJECTION, SOLUTION EPIDURAL; INTRACAUDAL; PERINEURAL
Status: DISCONTINUED | OUTPATIENT
Start: 2023-08-22 | End: 2023-08-22 | Stop reason: HOSPADM

## 2023-08-22 RX ORDER — ONDANSETRON 2 MG/ML
4 INJECTION INTRAMUSCULAR; INTRAVENOUS DAILY PRN
Status: DISCONTINUED | OUTPATIENT
Start: 2023-08-22 | End: 2023-08-22 | Stop reason: HOSPADM

## 2023-08-22 RX ORDER — OXYCODONE HYDROCHLORIDE 5 MG/1
5 TABLET ORAL EVERY 4 HOURS PRN
Qty: 20 TABLET | Refills: 0 | Status: SHIPPED | OUTPATIENT
Start: 2023-08-22

## 2023-08-22 RX ORDER — VASOPRESSIN 20 [USP'U]/ML
INJECTION, SOLUTION INTRAMUSCULAR; SUBCUTANEOUS
Status: DISCONTINUED | OUTPATIENT
Start: 2023-08-22 | End: 2023-08-22

## 2023-08-22 RX ORDER — MIDAZOLAM HYDROCHLORIDE 5 MG/ML
INJECTION INTRAMUSCULAR; INTRAVENOUS
Status: DISCONTINUED | OUTPATIENT
Start: 2023-08-22 | End: 2023-08-22

## 2023-08-22 RX ORDER — LIDOCAINE HYDROCHLORIDE AND EPINEPHRINE 10; 10 MG/ML; UG/ML
INJECTION, SOLUTION INFILTRATION; PERINEURAL
Status: DISCONTINUED | OUTPATIENT
Start: 2023-08-22 | End: 2023-08-22 | Stop reason: HOSPADM

## 2023-08-22 RX ORDER — LIDOCAINE HYDROCHLORIDE 20 MG/ML
INJECTION, SOLUTION EPIDURAL; INFILTRATION; INTRACAUDAL; PERINEURAL
Status: DISCONTINUED | OUTPATIENT
Start: 2023-08-22 | End: 2023-08-22

## 2023-08-22 RX ORDER — METHOCARBAMOL 750 MG/1
750 TABLET, FILM COATED ORAL 3 TIMES DAILY
Qty: 42 TABLET | Refills: 0 | Status: SHIPPED | OUTPATIENT
Start: 2023-08-22 | End: 2023-09-05

## 2023-08-22 RX ORDER — OXYCODONE HYDROCHLORIDE 5 MG/1
5 TABLET ORAL ONCE
Status: COMPLETED | OUTPATIENT
Start: 2023-08-22 | End: 2023-08-22

## 2023-08-22 RX ADMIN — PROPOFOL 200 MG: 10 INJECTION, EMULSION INTRAVENOUS at 11:08

## 2023-08-22 RX ADMIN — ROCURONIUM BROMIDE 25 MG: 10 INJECTION INTRAVENOUS at 11:08

## 2023-08-22 RX ADMIN — Medication 20 MG: at 11:08

## 2023-08-22 RX ADMIN — PROPOFOL 50 MG: 10 INJECTION, EMULSION INTRAVENOUS at 12:08

## 2023-08-22 RX ADMIN — PHENYLEPHRINE HYDROCHLORIDE 100 MCG: 10 INJECTION INTRAVENOUS at 12:08

## 2023-08-22 RX ADMIN — SODIUM CHLORIDE 0.25 MCG/KG/MIN: 9 INJECTION, SOLUTION INTRAVENOUS at 12:08

## 2023-08-22 RX ADMIN — SUCCINYLCHOLINE CHLORIDE 140 MG: 20 INJECTION, SOLUTION INTRAMUSCULAR; INTRAVENOUS at 11:08

## 2023-08-22 RX ADMIN — FENTANYL CITRATE 25 MCG: 50 INJECTION, SOLUTION INTRAMUSCULAR; INTRAVENOUS at 02:08

## 2023-08-22 RX ADMIN — LIDOCAINE HYDROCHLORIDE 80 MG: 20 INJECTION, SOLUTION EPIDURAL; INFILTRATION; INTRACAUDAL; PERINEURAL at 11:08

## 2023-08-22 RX ADMIN — VASOPRESSIN 3 UNITS: 20 INJECTION INTRAVENOUS at 12:08

## 2023-08-22 RX ADMIN — ROCURONIUM BROMIDE 5 MG: 10 INJECTION INTRAVENOUS at 11:08

## 2023-08-22 RX ADMIN — PHENYLEPHRINE HYDROCHLORIDE 50 MCG: 10 INJECTION INTRAVENOUS at 11:08

## 2023-08-22 RX ADMIN — CEFAZOLIN 2 G: 2 INJECTION, POWDER, FOR SOLUTION INTRAMUSCULAR; INTRAVENOUS at 11:08

## 2023-08-22 RX ADMIN — ACETAMINOPHEN 1000 MG: 10 INJECTION, SOLUTION INTRAVENOUS at 12:08

## 2023-08-22 RX ADMIN — SODIUM CHLORIDE 0.25 MCG/KG/MIN: 9 INJECTION, SOLUTION INTRAVENOUS at 11:08

## 2023-08-22 RX ADMIN — DEXAMETHASONE SODIUM PHOSPHATE 4 MG: 4 INJECTION, SOLUTION INTRAMUSCULAR; INTRAVENOUS at 11:08

## 2023-08-22 RX ADMIN — OXYCODONE HYDROCHLORIDE 5 MG: 5 TABLET ORAL at 02:08

## 2023-08-22 RX ADMIN — ONDANSETRON 4 MG: 2 INJECTION INTRAMUSCULAR; INTRAVENOUS at 12:08

## 2023-08-22 RX ADMIN — MIDAZOLAM 2 MG: 5 INJECTION INTRAMUSCULAR; INTRAVENOUS at 11:08

## 2023-08-22 RX ADMIN — Medication 10 MG: at 12:08

## 2023-08-22 RX ADMIN — ROCURONIUM BROMIDE 20 MG: 10 INJECTION INTRAVENOUS at 11:08

## 2023-08-22 RX ADMIN — GLYCOPYRROLATE 0.2 MG: 0.2 INJECTION, SOLUTION INTRAMUSCULAR; INTRAVENOUS at 12:08

## 2023-08-22 RX ADMIN — SUGAMMADEX 200 MG: 100 INJECTION, SOLUTION INTRAVENOUS at 12:08

## 2023-08-22 RX ADMIN — VASOPRESSIN 2 UNITS: 20 INJECTION INTRAVENOUS at 12:08

## 2023-08-22 RX ADMIN — SODIUM CHLORIDE: 9 INJECTION, SOLUTION INTRAVENOUS at 10:08

## 2023-08-22 NOTE — OP NOTE
DATE OF PROCEDURE: 8/22/2023.     SURGEON: Kamlesh Gomez M.D.     FIRST ASSISTANT SURGEON: John Perea MD, PGY-4     PREOPERATIVE DIAGNOSIS:  Symptomatic lumbar spinal stenosis L4-5      POSTOPERATIVE DIAGNOSIS: Symptomatic lumbar spinal stenosis L4-5      PROCEDURES PERFORMED:  1. Lumbar laminotomy, foraminotomy and disectomy L4/5     ANESTHESIA: General endotracheal anesthesia.     ESTIMATED BLOOD LOSS: 50 mL.     IMPLANTS: None.     SPECIMENS: None.     FINDINGS: None.     DRAINS: None.     COMPLICATIONS: None.     SPONGE AND NEEDLE COUNT: Correct x2.     NEUROLOGICAL MONITORING: Somatosensory evoked  potential, free-running EMG.  There were no changes to SSEP baselines.  There no significant EMG activity.     REASON FOR OPERATION AND BRIEF HISTORY AND PHYSICAL: Brenden Noemi Parisapriya a    53 y.o. female with refractory lumbar stenosis at the L4-5 level.  She has failed extensive conservative management and is here for surgery today.     DESCRIPTION OF INFORMED CONSENT: I had a sit down discussion with the patient regarding the planned procedure. We specifically discussed the risks, benefits, and alternatives to surgery. We discussed the surgical procedure including the skin incision, laminotomy, and nerve decompression: they understand the risks include but are not limited to death, paralysis, blindness, bleeding, infection, damage to arteries, veins and nerves, spinal fluid leak, continued or worsening pain, no improvement in symptoms, recurrent symptoms, and the possible need for more surgery in the future, the possible need for perioperative blood transfusion, as well as the possibility other unforseen and unknown complications. We talked about expected hospital stay and recovery period. All questions were answered; they understand and wish to proceed.        DESCRIPTION OF PROCEDURE: The patient was met in the preoperative area where   her low back was marked in the midline by me personally.  Subsequently, she was   brought to the Operating Room where sequential compression devices were placed   on the patient's bilateral calves and run throughout the case. The patient was   then intubated via general endotracheal anesthesia. They were then flipped prone   onto a Rio table with William frame. The head was secured on a facial pillow. The eyes were personally checked by suzanne found to be acceptable. The arms were held in a 90-90 position. All bony prominences were padded paying special attention to the axilla, elbows, hands, breast, knees, and feet. Being satisfied with positioning and confirming this to be adequate with Anesthesia, the patient's lower back was prepped and draped in normal sterile fashion.     A full timeout was then called identifying the patient, the procedural site and   level, the availability of all instruments and no specific nursing, surgical,   anesthetic or neurological monitoring concerns. Finding that it was safe to   proceed with surgery, the patient was given a weight-appropriate dose of   antibiotics by the Anesthesia Service.     I then infiltrated my planned incision with 10 mL of 1% lidocaine. I then made   a midline lumbar incision and carried dissection down through the skin and  subcutaneous tissues using combination of sharp dissection and electrocautery   where necessary. The dorsal fascia of the lumbar spine was identified and   incised in the midline and I performed a preliminary subperiosteal exposure of   what I took to be the L4, and L5 lamina lamina as well as what I took to be   the bilateral L4-5 facet joint. At the conclusion of my preliminary dissection, I   placed a [place] 4 elevator under what I took to be the trailing edge of the L4  lamina, took lateral radiograph and thus confirmed my level.     At this point, I finalized my exposure.  I then used a rongeur to remove the distal spinous process of L4.  I then used a high-speed drill to thin   the  trailing one-third of the L4 lamina to reveal the origin of the ligamentum   flavum. I then released the ligamentum flavum from the trailing edge of the L4  lamina using a forward-angle curette. I gently worked the ligamentum flavum   distally, and decompressed it with a Kerrison punch.  I performed a central decompression.  The wound was then thoroughly irrigated, including   irrigation of the disk space, which revealed no residual free fragments. I then  finalized hemostasis with FloSeal and patties. Valsalva maneuver to 40 mmHg   demonstrated no cerebrospinal fluid leak. Next, 500 mg of vancomycin powder was  placed in the deep space and deep fascia was closed with #1 Vicryl in a   figure-of-eight fashion. The superficial layer was then irrigated and closed   over a drain with 2-0 Vicryl, 3-0 Monocryl, Dermabond and Steri-Strips. A soft   dressing was placed. The patient was then flipped supine, extubated and transferred to the Recovery Room in stable condition.

## 2023-08-22 NOTE — DISCHARGE SUMMARY
[facility] - Surgery (2nd Fl)  Discharge Note  Short Stay    Procedure(s) (LRB):  LAMINECTOMY, SPINE, LUMBAR L4 (N/A)      OUTCOME: Patient tolerated treatment/procedure well without complication and is now ready for discharge.    DISPOSITION: Home or Self Care    FINAL DIAGNOSIS:  <principal problem not specified>    FOLLOWUP: In clinic    DISCHARGE INSTRUCTIONS:  See patient instructions

## 2023-08-22 NOTE — PATIENT INSTRUCTIONS
DR. JANET GOMEZ'S POSTOPERATIVE INSTRUCTIONS -   LUMBAR LAMINECTOMY       Antibiotics: You do not need additional antibiotics at home.    Wound Care: You may remove your dressing and shower 5 days after surgery. Until then please keep your wound clean and dry. Sponge baths are acceptable. Do not go in a pool or hot tub until seen in clinic. Please leave the small steri-strips covering your wound in place until they fall off naturally (2 weeks). You may notice clear suture ends hanging from the sides of your incision after the steri-strips are removed, it is ok to clip these with scissors.    Brace: You do not need a brace.    Pain: We will use a multimodal approach for pain management after your surgery.  You will be given a prescription for pain medicine when you are discharged from the hospital.  You will also be given prescriptions for Robaxin (a muscle relaxer), Gabapentin, Celebrex and Tylenol.  Please note: you will only be given ONE prescription for narcotics when you are discharged from the hospital.  This medication is for breakthrough pain only. This medication will not be refilled.  The other medications given to you may be refilled if needed.      Infection: Signs of infection include increasing wound drainage and redness around the wound, as well as a temperature over 101.5 degrees. It is unnecessary to take your temperature on a routine basis. Please call the above number if you are concerned about an infection.    Driving and Work: It is ok to return to driving and work as long as you are not taking narcotic pain medications. Please do not lift over 10 pounds or participate in exercise or sports until cleared by Dr. Gomez.    Deep Venous Thrombosis (Blood Clots): Symptoms include swelling in the legs and shortness of breath. Please call the office or proceed to the nearest emergency room if you have any of these symptoms.    Physical Therapy: The best physical therapy after surgery is walking.  Please try to walk as much as possible.    Follow-up: You will be scheduled for a follow-up appointment in 4 weeks with either Dr. Gomez or his physician assistant, Shaina Maharaj PA-C.    Questions: During business hours please call [phone] for routine questions. For after hours questions please call [phone] and ask to speak with the Orthopaedic resident on call.    Disability: If you submitted short term disability paperwork for us to complete and would like to check the status, please call the Disability Department at [phone].  You may fax any necessary paperwork to [phone].

## 2023-08-22 NOTE — TRANSFER OF CARE
"Anesthesia Transfer of Care Note    Patient: Brenden Franklin NirmalJamal    Procedure(s) Performed: Procedure(s) (LRB):  LAMINECTOMY, SPINE, LUMBAR L4 (N/A)    Patient location: [facility]    Anesthesia Type: general    Transport from OR: Transported from OR on 6-10 L/min O2 by face mask with adequate spontaneous ventilation    Post pain: adequate analgesia    Post assessment: no apparent anesthetic complications    Post vital signs: stable    Level of consciousness: awake and alert    Nausea/Vomiting: no nausea/vomiting    Complications: none    Transfer of care protocol was followed      Last vitals:   Visit Vitals  /74 (BP Location: Right arm, Patient Position: Lying)   Pulse (!) 59   Temp 36.9 °C (98.4 °F) (Temporal)   Resp 18   Ht 5' 4" (1.626 m)   Wt 86.6 kg (191 lb)   SpO2 100%   Breastfeeding No   BMI 32.79 kg/m²     "

## 2023-08-22 NOTE — PLAN OF CARE
Chart reviewed. Preop nursing care completed per orders. Safe surgery checklist complete aside from anesthesia consent and H&P update. Mom at bedside and to take eyeglasses. Clothing placed in postop locker. Pt stated she was diagnosed with type [facility] and prescribed metformin, but she has not started medication yet. Blood glucose today in preop is 90. Type and screen/ABO drawn and sent to blood bank. Call bell within reach. Instructed pt to call for assistance.

## 2023-08-23 NOTE — ANESTHESIA POSTPROCEDURE EVALUATION
Anesthesia Post Evaluation    Patient: Brenden DaleboydEwelina    Procedure(s) Performed: Procedure(s) (LRB):  LAMINECTOMY, SPINE, LUMBAR L4 (N/A)    Final Anesthesia Type: general      Patient location during evaluation: PACU  Patient participation: Yes- Able to Participate  Level of consciousness: awake and alert and oriented  Post-procedure vital signs: reviewed and stable  Pain management: adequate  Airway patency: patent    PONV status at discharge: No PONV  Anesthetic complications: no      Cardiovascular status: hemodynamically stable  Respiratory status: unassisted, spontaneous ventilation and room air  Hydration status: euvolemic  Follow-up not needed.          Vitals Value Taken Time   /61 08/22/23 1532   Temp 36.6 °C (97.9 °F) 08/22/23 1313   Pulse 82 08/22/23 1535   Resp 46 08/22/23 1535   SpO2 94 % 08/22/23 1533   Vitals shown include unvalidated device data.      No case tracking events are documented in the log.      Pain/Zohaib Score: Pain Rating Prior to Med Admin: 6 (8/22/2023  2:22 PM)  Pain Rating Post Med Admin: 3 (8/22/2023  3:00 PM)  Zohaib Score: 10 (8/22/2023  3:30 PM)

## 2023-09-25 ENCOUNTER — OFFICE VISIT (OUTPATIENT)
Dept: ORTHOPEDIC SURGERY | Facility: CLINIC | Age: 53
End: 2023-09-25
Payer: OTHER GOVERNMENT

## 2023-09-25 VITALS — BODY MASS INDEX: 32.6 KG/M2 | HEIGHT: 64 IN | WEIGHT: 190.94 LBS

## 2023-09-25 DIAGNOSIS — Z98.890 HISTORY OF LUMBAR LAMINECTOMY: Primary | ICD-10-CM

## 2023-09-25 PROCEDURE — 99024 PR POST-OP FOLLOW-UP VISIT: ICD-10-PCS | Mod: S$GLB,,, | Performed by: ORTHOPAEDIC SURGERY

## 2023-09-25 PROCEDURE — 99024 POSTOP FOLLOW-UP VISIT: CPT | Mod: S$GLB,,, | Performed by: ORTHOPAEDIC SURGERY

## 2023-09-25 NOTE — PROGRESS NOTES
Date of Surgery:  August 22, 2023    Procedure:  L4 laminectomy    History: Brenden Franklin Parisa is seen today for follow-up following the above listed procedure. Overall the patient is doing well but today notes some pruritus about the incisional site.  She would like to progress her activities..    Exam: Incision is healing well. There is no sign of infection. Neuro exam is stable. No signs of DVT.    Radiographs:  No new films today    Assessment/Plan: Doing well postoperatively. I would like her to focus on walking for 4 more weeks, at 2 months postop she may progress her activities as tolerated, I will see her back at 3 months postop with no  radiograph.

## 2023-10-25 ENCOUNTER — HOSPITAL ENCOUNTER (OUTPATIENT)
Dept: RADIOLOGY | Facility: CLINIC | Age: 53
Discharge: HOME | End: 2023-10-25
Attending: ORTHOPAEDIC SURGERY
Payer: OTHER GOVERNMENT

## 2023-10-25 DIAGNOSIS — Z98.890 S/P SPINAL SURGERY: ICD-10-CM

## 2023-10-25 PROCEDURE — 72100 X-RAY EXAM L-S SPINE 2/3 VWS: CPT | Mod: ,,, | Performed by: ORTHOPAEDIC SURGERY

## 2023-10-25 PROCEDURE — 72100 XR LUMBAR SPINE 2 OR 3 VIEWS: ICD-10-PCS | Mod: ,,, | Performed by: ORTHOPAEDIC SURGERY

## 2023-10-31 ENCOUNTER — PATIENT MESSAGE (OUTPATIENT)
Dept: ORTHOPEDIC SURGERY | Facility: CLINIC | Age: 53
End: 2023-10-31
Payer: OTHER GOVERNMENT

## 2023-11-20 ENCOUNTER — OFFICE VISIT (OUTPATIENT)
Dept: ORTHOPEDIC SURGERY | Facility: CLINIC | Age: 53
End: 2023-11-20
Payer: OTHER GOVERNMENT

## 2023-11-20 VITALS — BODY MASS INDEX: 32.6 KG/M2 | WEIGHT: 190.94 LBS | HEIGHT: 64 IN

## 2023-11-20 DIAGNOSIS — Z98.890 HISTORY OF LUMBAR LAMINECTOMY: Primary | ICD-10-CM

## 2023-11-20 PROCEDURE — 99024 PR POST-OP FOLLOW-UP VISIT: ICD-10-PCS | Mod: S$GLB,,, | Performed by: ORTHOPAEDIC SURGERY

## 2023-11-20 PROCEDURE — 99024 POSTOP FOLLOW-UP VISIT: CPT | Mod: S$GLB,,, | Performed by: ORTHOPAEDIC SURGERY

## 2023-11-20 NOTE — PROGRESS NOTES
Date of Surgery:  August 22, 2023    Procedure:  L4 laminectomy    History: Brenden Franklin Parisa is seen today for follow-up following the above listed procedure.  Unfortunately the patient had a setback.  She had a fall 3 weeks ago when she felt like her left leg gave out.  Since then she is been having ongoing back pain, and left lower extremity sciatica.  This is particularly bad after she VAC yesterday.  Thus far she is been treating it with heat and cold as well as over-the-counter patches and ibuprofen.    Exam: Incision is healing well. There is no sign of infection. Neuro exam is stable. No signs of DVT.    Radiographs:  Today I reviewed new AP and lateral lumbar spine radiographs performed in the office today that demonstrate no significant spondylosis or evidence of instability or fracture.    Assessment/Plan:  I think she is having a little bit of a setback related to this fall.  We will get her set up for physical therapy at the [facility].  I will see her back in 2 months.

## 2023-12-08 ENCOUNTER — HOSPITAL ENCOUNTER (OUTPATIENT)
Dept: RADIOLOGY | Facility: HOSPITAL | Age: 53
Discharge: HOME | End: 2023-12-08
Payer: MEDICARE

## 2023-12-08 DIAGNOSIS — Z12.31 ENCOUNTER FOR SCREENING MAMMOGRAM FOR BREAST CANCER: ICD-10-CM

## 2023-12-08 PROCEDURE — 77067 SCR MAMMO BI INCL CAD: CPT | Mod: TC

## 2023-12-08 PROCEDURE — 77067 SCR MAMMO BI INCL CAD: CPT | Mod: 26,,, | Performed by: RADIOLOGY

## 2023-12-08 PROCEDURE — 77067 MAMMO DIGITAL SCREENING BILAT WITH TOMO: ICD-10-PCS | Mod: 26,,, | Performed by: RADIOLOGY

## 2023-12-08 PROCEDURE — 77063 MAMMO DIGITAL SCREENING BILAT WITH TOMO: ICD-10-PCS | Mod: 26,,, | Performed by: RADIOLOGY

## 2023-12-08 PROCEDURE — 77063 BREAST TOMOSYNTHESIS BI: CPT | Mod: 26,,, | Performed by: RADIOLOGY

## 2024-01-03 ENCOUNTER — HOSPITAL ENCOUNTER (OUTPATIENT)
Dept: RADIOLOGY | Facility: HOSPITAL | Age: 54
Discharge: HOME | End: 2024-01-03
Attending: FAMILY MEDICINE
Payer: OTHER GOVERNMENT

## 2024-01-03 DIAGNOSIS — R92.8 ABNORMAL SCREENING MAMMOGRAM: ICD-10-CM

## 2024-01-03 PROCEDURE — 77066 DX MAMMO INCL CAD BI: CPT | Mod: TC

## 2024-01-03 PROCEDURE — 77066 DX MAMMO INCL CAD BI: CPT | Mod: 26,,, | Performed by: RADIOLOGY

## 2024-01-03 PROCEDURE — 76641 ULTRASOUND BREAST COMPLETE: CPT | Mod: TC,50

## 2024-01-03 PROCEDURE — 77062 BREAST TOMOSYNTHESIS BI: CPT | Mod: 26,,, | Performed by: RADIOLOGY

## 2024-01-03 PROCEDURE — 76641 ULTRASOUND BREAST COMPLETE: CPT | Mod: 26,50,, | Performed by: RADIOLOGY

## 2024-01-11 DIAGNOSIS — Z98.890 S/P SPINAL SURGERY: Primary | ICD-10-CM

## 2024-01-11 RX ORDER — METHOCARBAMOL 750 MG/1
750 TABLET, FILM COATED ORAL 3 TIMES DAILY
Qty: 30 TABLET | Refills: 1 | Status: SHIPPED | OUTPATIENT
Start: 2024-01-11 | End: 2024-01-31

## 2024-01-11 NOTE — TELEPHONE ENCOUNTER
Spoke with pt and advised that we will send Robaxin to her pharmacy to help with the pain she is having. Patient verbalized understanding.

## 2024-01-25 ENCOUNTER — HOSPITAL ENCOUNTER (OUTPATIENT)
Dept: RADIOLOGY | Facility: HOSPITAL | Age: 54
Discharge: HOME | End: 2024-01-25
Attending: FAMILY MEDICINE
Payer: OTHER GOVERNMENT

## 2024-01-25 DIAGNOSIS — R92.8 ABNORMAL MAMMOGRAM: ICD-10-CM

## 2024-01-25 PROCEDURE — 19081 BX BREAST 1ST LESION STRTCTC: CPT | Mod: RT,,, | Performed by: STUDENT IN AN ORGANIZED HEALTH CARE EDUCATION/TRAINING PROGRAM

## 2024-01-25 PROCEDURE — 19083 BX BREAST 1ST LESION US IMAG: CPT | Mod: 59,LT,, | Performed by: STUDENT IN AN ORGANIZED HEALTH CARE EDUCATION/TRAINING PROGRAM

## 2024-01-25 PROCEDURE — 19082 BX BREAST ADD LESION STRTCTC: CPT | Mod: RT,,, | Performed by: STUDENT IN AN ORGANIZED HEALTH CARE EDUCATION/TRAINING PROGRAM

## 2024-01-25 PROCEDURE — 77061 BREAST TOMOSYNTHESIS UNI: CPT | Mod: TC,RT

## 2024-01-25 PROCEDURE — 77065 DX MAMMO INCL CAD UNI: CPT | Mod: TC,RT

## 2024-01-25 PROCEDURE — 19083 BX BREAST 1ST LESION US IMAG: CPT | Mod: RT

## 2024-01-25 PROCEDURE — 77065 DX MAMMO INCL CAD UNI: CPT | Mod: 26,RT,, | Performed by: STUDENT IN AN ORGANIZED HEALTH CARE EDUCATION/TRAINING PROGRAM

## 2024-01-25 PROCEDURE — A4648 IMPLANTABLE TISSUE MARKER: HCPCS

## 2024-01-25 PROCEDURE — 77061 BREAST TOMOSYNTHESIS UNI: CPT | Mod: 26,RT,, | Performed by: STUDENT IN AN ORGANIZED HEALTH CARE EDUCATION/TRAINING PROGRAM

## 2024-01-26 DIAGNOSIS — C50.911 INVASIVE DUCTAL CARCINOMA OF BREAST, FEMALE, RIGHT: Primary | ICD-10-CM

## 2024-01-29 ENCOUNTER — OFFICE VISIT (OUTPATIENT)
Dept: ORTHOPEDIC SURGERY | Facility: CLINIC | Age: 54
End: 2024-01-29
Payer: OTHER GOVERNMENT

## 2024-01-29 DIAGNOSIS — M54.16 LUMBAR RADICULOPATHY: Primary | ICD-10-CM

## 2024-01-29 PROCEDURE — 99213 OFFICE O/P EST LOW 20 MIN: CPT | Mod: S$GLB,,, | Performed by: ORTHOPAEDIC SURGERY

## 2024-01-29 NOTE — PROGRESS NOTES
Date of Surgery:  August 22, 2023    Procedure:  L4 laminectomy    History: Brenden Franklin Parisa is seen today for follow-up following the above listed procedure.  Recently she has been diagnosed with breast cancer.  Two weeks ago she had a flare of left lumbar radiculopathy after sitting up from the sofa.  The pain is improving.    Exam: Incision is healed. There is no sign of infection. Neuro exam is stable. No signs of DVT.    Radiographs:  No new films today    Assessment/Plan:  Today we discussed options, I have recommended that she observe this for now.  We will schedule her for follow up in 2 months, if her pain worsens we can obtain a new MRI..

## 2024-02-01 LAB — PSYCHE PATHOLOGY RESULT: NORMAL

## 2024-02-05 ENCOUNTER — TELEPHONE (OUTPATIENT)
Dept: SURGERY | Facility: CLINIC | Age: 54
End: 2024-02-05
Payer: OTHER GOVERNMENT

## 2024-02-05 NOTE — PROGRESS NOTES
[facility] - Breast [place] [facility]  Breast Surgical Oncology  New Patient Office Visit - H&P      Referring Provider: Dr. Jani Rutherford   PCP: Jani Rutherford DO     Chief Complaint:   Chief Complaint   Patient presents with    Biopsy     Patient reports intermittent aching pain UIQ Right Breast no swelling, no nipple discharge         Subjective:       HPI:  Brenden Mccauley is a 53 y.o. female who presents on 2024 for evaluation of newly diagnosed right  breast cancer. She has a history of reduction mammoplasty in  as well as previous right breast biopsy.     A detailed patient history was obtained and reviewed. She currently denies any breast issues including rashes, redness, pain, swelling, nipple discharge, or new lumps/masses.    MG breast density: Category C (heterogeneously dense)     Imagin2023 BL SC MG at [facility]= which revealed on R mg, there is an irregular mass with associated calcifications at 9 o'clock posterior depth.  There are calcifications in the right breast lower inner quadrant, middle depth. There is an asymmetry in the medial right breast, anterior depth.     On L MG, there are calcifications noted at 3 o'clock, posterior depth, in the medial left breast, anterior to middle depth.  There is an asymmetry with possible associated architectural distortion in the medial left breast, middle depth. BIRADS-0; additional imaging needed    2. 1/3/2024 BL DG MG/ BL US BREAST COMPLETE at [facility]-        Right MG- at 10 o'clock middle/posterior depth, there is a persistent 1.4 cm irregular mass with spiculated margins and associated architectural distortion. There are punctate and amorphous calcifications associated with this mass. There are heterogeneous, punctate, and amorphous calcifications in a diffuse distribution throughout the right breast.  The most concentrated region of calcifications is located in the central/superior medial right breast  anterior to middle depths. The asymmetry in the medial breast, anterior depth, effaces into normal-appearing fibroglandular tissue.   There are diffuse post surgical changes of the right breast related to previous reduction mammoplasty.          Right US-  At 10 o'clock 4 cm FN, there is a 1.4 cm x 0.9 cm x 1.4 cm irregular hypoechoic mass with spiculated and indistinct margins, posterior shadowing, associated architectural distortion, and associated echogenic calcifications. Benign simple cysts seen at 11 o'clock and 1 o'clock subareolar. The right axilla demonstrates no significant right axillary adenopathy.        Left MG- there are heterogeneous, punctate, and amorphous calcifications in a diffuse distribution throughout the left breast. The most concentrated groupings of calcifications are located in the upper inner and upper outer quadrants of the left breast middle/posterior depth. The asymmetry with possible associated architectural distortion seen in the medial left breast effaced into normal-appearing fibroglandular tissue on spot compression tomosynthesis images.        Left US- there are two morphologically similar parallel oval avascular hypoechoic masses with circumscribed margins  measuring 0.9 cm x 0.2 cm x 0.5 cm; at 4 o'clock 5 cm FN and 1.0 cm x 0.3 cm x 0.8 cm; at 4 o'clock 3 cm from the nipple.  These masses are without significant change mammographically dating back at least to the 2021 exam, and are also likely without significant change dating back to the 2017 exam allowing for differences in technique and interval reduction mammoplasty.  No suspicious sonographic abnormality is evident in the left breast.. Left axilla demonstrates no significant left axillary adenopathy. BIRADS-4 suspicious;biopsy recommended         Pathology:  1/25/2024 Ultrasound-guided Core Needle Biopsy Right Breast Mass 10 o'clock 4 cm FN-        - Invasive mammary carcinoma with mixed ductal and lobular features,  grade 2 (measuring 9 mm) with associated calcifications          ER 86%          CO 80%          HER2 aden 1+          Ki67 23%    2. 2024 Stereotactic guided Core Needle Biopsy Right Breast 3 o'clock Calcifications-       -Stromal fibrosis, cystic dilation of ducts, sclerosing adenosis and microcalcifications         -No evidence of malignancy    3. 2024 Stereotactic guided Core Needle Biopsy Left Breast UIQ Middle Depth Calcifications       - Stromal fibrosis, cystic dilation of ducts, apocrine metaplasia, sclerosing adenosis, fibroadenomatoid change, microcalcifications, and mild ductal epithelial hyperplasia of the usual type        -No evidence of malignancy        OB/GYN History:  Age at Menarche Onset: 10  Menopausal Status: postmenopausal, LMP: No LMP recorded. Patient has had a hysterectomy.  Hysterectomy/Oophorectomy: hysterectomy without BSO, at age 40  Hormonal birth control (duration): Yes OCP (estrogen/progesterone).   Pregnancy History:   Age at first live birth: 18  Hormone Replacement Therapy: Yes, Oral Estrogen  Patient did not breast feed.  Patient denies nipple discharge.   Patient admits to to previous breast biopsy- Right Breast  Benign  Patient denies to a personal history of breast cancer.    Other Relevant History:  Prior thoracic RT: none  Genetic testing: No  Ashkenazi Confucianist descent: No    Family History:  Family History   Problem Relation Age of Onset    Diabetes type I Mother     Multiple myeloma Mother     Cancer Mother 55        Multiple myeloma    Diabetes Mother     Hypertension Mother     Kidney disease Mother     Alzheimer's disease Father             No Known Problems Sister     No Known Problems Brother     Pancreatic cancer Maternal Grandmother 70    Breast cancer Paternal Grandmother 55    Liver cancer Paternal Grandmother 55    Breast cancer Paternal Aunt 62        Past History:  Past Medical History:   Diagnosis Date    Abnormal uterine bleeding      Anemia     Anxiety     Depression     Diabetes mellitus     Endometriosis of uterus     Fibroid     Heart attack     Hyperlipidemia     Menopause 2017    Pregnancy 1987    Miscarriage        Past Surgical History:   Procedure Laterality Date    ABDOMINAL SURGERY      APPENDECTOMY      BREAST SURGERY      CERVICAL BIOPSY  W/ LOOP ELECTRODE EXCISION       SECTION      COSMETIC SURGERY      Breast reduction    DILATION AND CURETTAGE OF UTERUS      ENDOMETRIAL ABLATION      gastric sleeve      HYSTERECTOMY      HYSTEROSCOPY      JOINT REPLACEMENT      LUMBAR LAMINECTOMY N/A 2023    Procedure: LAMINECTOMY, SPINE, LUMBAR L4 SNS:SSEP/EMG;  Surgeon: Kamlesh Gomez MD;  Location: [facility] OR;  Service: Orthopedics;  Laterality: N/A;    LUMBAR LAMINECTOMY N/A 2023    Procedure: LAMINECTOMY, SPINE, LUMBAR L4;  Surgeon: Kamlesh Gomez MD;  Location: [facility] OR [address] [place] [address];  Service: Neurosurgery;  Laterality: N/A;    SPINE SURGERY      TONSILLECTOMY      TOTAL KNEE ARTHROPLASTY Bilateral     TUBAL LIGATION      WISDOM TOOTH EXTRACTION          Social History     Socioeconomic History    Marital status: Single   Tobacco Use    Smoking status: Every Day     Current packs/day: 0.50     Average packs/day: 0.5 packs/day for 5.0 years (2.5 ttl pk-yrs)     Types: Cigarettes    Smokeless tobacco: Never    Tobacco comments:     1 pk every 3 days    Substance and Sexual Activity    Alcohol use: Not Currently     Alcohol/week: 1.0 standard drink of alcohol     Types: 1 Glasses of wine per week    Drug use: Never    Sexual activity: Not Currently     Partners: Male     Birth control/protection: Abstinence        Body mass index is 32.44 kg/m².     Allergy/Medications:   Review of patient's allergies indicates:   Allergen Reactions    Tomato (solanum lycopersicum) Hives and Swelling    Latex, natural rubber Hives          Current Outpatient Medications:     ALPRAZolam (XANAX) 0.25 MG  tablet, Take by mouth., Disp: , Rfl:     aspirin (ECOTRIN) 81 MG EC tablet, Take 81 mg by mouth once daily., Disp: , Rfl:     busPIRone (BUSPAR) 30 MG Tab, Take 30 mg by mouth 2 (two) times a day., Disp: , Rfl:     carvediloL (COREG) 3.125 MG tablet, Take 3.125 mg by mouth 2 (two) times daily., Disp: , Rfl:     celecoxib (CELEBREX) 200 MG capsule, , Disp: , Rfl:     etodolac (LODINE) 200 MG Cap, Take 200 mg by mouth 2 (two) times daily., Disp: , Rfl:     lamoTRIgine (LAMICTAL) 100 MG tablet, Take 100 mg by mouth once daily., Disp: , Rfl:     montelukast (SINGULAIR) 10 mg tablet, Take 10 mg by mouth once daily., Disp: , Rfl:     ramelteon (ROZEREM) 8 mg tablet, Take 8 mg by mouth once daily., Disp: , Rfl:     rosuvastatin (CRESTOR) 10 MG tablet, Take 10 mg by mouth once daily., Disp: , Rfl:     sertraline 200 mg Cap, Take 200 mg by mouth once daily., Disp: , Rfl:     vitamin D (VITAMIN D3) 1000 units Tab, Take 1,000 Units by mouth once daily., Disp: , Rfl:     oxyCODONE (ROXICODONE) 5 MG immediate release tablet, Take 1 tablet (5 mg total) by mouth every 4 (four) hours as needed for Pain., Disp: 20 tablet, Rfl: 0    traMADoL (ULTRAM) 50 mg tablet, Take 1 tablet (50 mg total) by mouth every 6 (six) hours., Disp: 60 tablet, Rfl: 0  No current facility-administered medications for this visit.       Review of Systems:  Review of Systems   Constitutional:  Negative for chills, fever and weight loss.   HENT:  Negative for sore throat.    Eyes:  Negative for blurred vision and double vision.   Respiratory:  Negative for cough and shortness of breath.    Cardiovascular:  Negative for chest pain, palpitations and leg swelling.   Gastrointestinal:  Positive for constipation. Negative for abdominal pain, diarrhea, heartburn, nausea and vomiting.        Takes meds 1 / week   Genitourinary:  Negative for dysuria, flank pain, frequency, hematuria and urgency.   Musculoskeletal:  Negative for back pain, joint pain and myalgias.  "  Neurological:  Positive for headaches. Negative for dizziness.        Been having for years   Endo/Heme/Allergies:  Does not bruise/bleed easily.   Psychiatric/Behavioral:  Negative for depression. The patient is not nervous/anxious.           Objective:     Vitals:  Blood pressure 136/87, pulse 91, temperature 97.5 °F (36.4 °C), temperature source Oral, resp. rate 18, height 5' 4" (1.626 m), weight 85.7 kg (189 lb), SpO2 98 %.      Physical Exam:  General: The patient is awake, alert and oriented times three. The patient is well nourished and in no acute distress.   Neck: There is no evidence of palpable cervical, supraclavicular or axillary adenopathy. The neck is supple. The thyroid is not enlarged.   Musculoskeletal: The patient has a normal range of motion of her bilateral upper extremities.   Chest: Examination of the chest wall fails to reveal any obvious abnormalities. The lungs are clear to auscultation bilaterally without rales, rhonchi, or wheezing.   Cardiovascular: The heart has a regular rate and rhythm without murmurs, gallops or rubs.  Breast:  Right: Examination of right breast fails to reveal any dominant masses or areas of significant focal nodularity. The nipple is everted without evidence of discharge. There is no skin dimpling with movement of the pectoralis. There are no significant skin changes overlying the breast. She well healed Wise-pattern reduction incisions.   Left: Examination of the left breast fails to reveal any dominant masses or areas of significant focal nodularity. The nipple is everted without evidence of discharge. There is no skin dimpling with movement of the pectoralis. There are no significant skin changes overlying the breast. She well healed Wise-pattern reduction incisions.   Abdomen: The abdomen is soft, flat, nontender and nondistended with no palpable masses or organomegaly.  Integumentary: no rashes or skin lesions present  Neurologic: cranial nerves intact, no " signs of peripheral neurological deficit, motor/sensory function intact    Assessment and Discussion:      Cancer Staging   Malignant neoplasm of upper-outer quadrant of right breast in female, estrogen receptor positive  Staging form: Breast, AJCC 8th Edition  - Clinical stage from 2/6/2024: Stage IA (cT1c, cN0, cM0, G2, ER+, WI+, HER2-) - Signed by Christina Romero MD on 2/7/2024        Encounter Diagnoses   Name Primary?    Malignant neoplasm of upper-outer quadrant of right breast in female, estrogen receptor positive Yes      We discussed her imaging and pathology results in detail     We discussed the need to proceed with local and systemic treatment.      Local Surgical Treatment options:     Breast Surgical Procedures  Breast Conservation Surgery (Partial Mastectomy or Lumpectomy)  Palpation-Guided - removal of breast cancer which is felt on clinical exam  Localization-Guided - required placement of a MagSeed and/or wire (if needed) in the area of concern to allow for identification during surgery. This will take place prior to surgery (usually a few days before). During surgery the MagSeed is detected with a probe and the cancer area is removed along with the MagSeed and/or previously placed clip.   Incisions are usually closed with dissolving stitches, followed by glue and Dermabond.   Mastectomy  Simple - includes removal of breast skin, subcutaneous (fat) tissue, and nipple areolar complex.   Skin-Sparing - includes removal of the breast skin while sparing enough for reconstruction. It also is involves removal of the subcutaneous (fat) tissue and nipple areolar complex.  Nipple Sparing - includes removal of all the breast tissue underneath the nipple, areola, and breast skin is removed. The tissue beneath the nipple and areola are checked for cancer. If cancer is detected, the nipple and areola are then removed.   Surgical Risk include surgical site infections, hematoma or seroma requiring operation,  "necrosis of nipple-areola and/or mastectomy flaps requiring debridement or hyperbaric therapy, unplanned re-operations, and delay in adjuvant treatments.    Drain placement may be necessary after mastectomy and can remain in place for greater than 10-14 day, occasionally longer.     Axillary Surgical Procedures  [place] Lymph Node Biopsy  Technetium-99 - a clear substance injected around the nipple and areola on the day of surgery prior to surgery starting which provides a sound "road" map to the lymph nodes needing to be removed for further examination.  Blue Dye - either Methylene Blue or Isosulfan is given in the operating room and provides a color "road" map to the lymph nodes needing to be removed for further examination.  MagTrace (Superparamagnetic oxide) - is used to assist with lymph node mapping as well.  Risk - axillary swelling related to bleeding or serous fluid, infection, nerve damage (temporary or permanent), lymphedema (5-6% risk), additional surgery related to final pathology or complications from the procedure  Axillary Lymph Node Dissection  Risk including  the above plus nerve injury which could be permanent and lymphedema risk above 20-25%     Reconstruction Procedures  Implant- based  Autologous-based  Combination  Immediate versus Delayed  Oncoplastic reduction     The pros and cons of these reconstructive methods were addressed. A second operation maybe required before the final completion of the reconstructive process. I also explained to the patient that the reconstructive options are generally by law required to be covered by insurance and would be considered part of a cancer operation and not a cosmetic operation.     Radiation Treatment Options:  Whole-Breast  Partial Breast      Systemic treatment options:  Hormone Blocker/Endocrine Therapy  Tamoxifen  Raloxifene  Aromatase Inhibitor   Letrozole (Femara)   Anastrozole (Arimidex)   Exemestane (Aromasin)     2. Chemotherapy     3. " Immunomodulator & Target Therapies (such as Herceptin)    We had a detail discussion regarding role of MRI. We also discussed in detail role of genetic testing. She would meet criteria given additional family history information obtained during her visit. We discussed testing starting with her and possibility of additional testing pending those results.         Plan:     Problem List Items Addressed This Visit          Oncology    Malignant neoplasm of upper-outer quadrant of right breast in female, estrogen receptor positive - Primary    Current Assessment & Plan     Recommend BL Breast MRI - re: evaluation of extent of disease  Recommend Genetic Testing - re: newly diagnosed breast cancer and family history.   Follow-up after imaging and testing results.          Relevant Orders    MRI Breast w/wo Contrast, w/CAD, Bilateral    Ambulatory referral/consult to Plastic Surgery             All of questions were answered    Christina Romero MD  Breast Surgical Oncology     OFFICE VISIT CODING:    Non-face-to-face time included:  Yes Preparing to see the patient such as reviewing the patient record  Yes Obtaining and reviewing separately obtained history  Yes Independently interpreting results  Yes Documenting clinical information in electronic health record  No Ordering appropriate medications  Yes Ordering appropriate tests  Yes Ordering appropriate procedures (including follow-up)  Yes Referring and communicating with other health care professionals (not separately reported)  Yes Care Coordination (not separately reported)    Face-to-face time included:  Yes Performing a medically necessary appropriate history, examination, and/or evaluation  Yes Communicating results to the patient/family/caregiver  Yes Counseling and educating the patient/family/caregiver  Yes Answering patient/family/caregiver questions    Total Time: 65 minutes    Total time includes both face-to-face and non-face-to-face time personally spent by  myself on the day of the visit.

## 2024-02-05 NOTE — TELEPHONE ENCOUNTER
Pre visit call made to patient.  I introduced myself, and my role as navigator to patient.  We spoke about what she can expect during her appointment with Dr. Romero on 2/6/2024.  She verbalized understanding.

## 2024-02-06 ENCOUNTER — OFFICE VISIT (OUTPATIENT)
Dept: SURGERY | Facility: CLINIC | Age: 54
End: 2024-02-06
Payer: OTHER GOVERNMENT

## 2024-02-06 VITALS
SYSTOLIC BLOOD PRESSURE: 136 MMHG | HEIGHT: 64 IN | WEIGHT: 189 LBS | BODY MASS INDEX: 32.27 KG/M2 | OXYGEN SATURATION: 98 % | TEMPERATURE: 98 F | RESPIRATION RATE: 18 BRPM | DIASTOLIC BLOOD PRESSURE: 87 MMHG | HEART RATE: 91 BPM

## 2024-02-06 DIAGNOSIS — C50.411 MALIGNANT NEOPLASM OF UPPER-OUTER QUADRANT OF RIGHT BREAST IN FEMALE, ESTROGEN RECEPTOR POSITIVE: Primary | ICD-10-CM

## 2024-02-06 DIAGNOSIS — Z17.0 MALIGNANT NEOPLASM OF UPPER-OUTER QUADRANT OF RIGHT BREAST IN FEMALE, ESTROGEN RECEPTOR POSITIVE: Primary | ICD-10-CM

## 2024-02-06 PROCEDURE — 99215 OFFICE O/P EST HI 40 MIN: CPT | Mod: PBBFAC | Performed by: SURGERY

## 2024-02-06 PROCEDURE — 99999 PR PBB SHADOW E&M-EST. PATIENT-LVL V: CPT | Mod: PBBFAC,,, | Performed by: SURGERY

## 2024-02-06 PROCEDURE — 99205 OFFICE O/P NEW HI 60 MIN: CPT | Mod: S$PBB,,, | Performed by: SURGERY

## 2024-02-06 RX ORDER — CELECOXIB 200 MG/1
CAPSULE ORAL
COMMUNITY

## 2024-02-06 RX ORDER — ALPRAZOLAM 0.25 MG/1
TABLET ORAL
COMMUNITY
Start: 2024-01-15 | End: 2024-04-25

## 2024-02-07 ENCOUNTER — TELEPHONE (OUTPATIENT)
Dept: SURGERY | Facility: CLINIC | Age: 54
End: 2024-02-07
Payer: OTHER GOVERNMENT

## 2024-02-07 NOTE — TELEPHONE ENCOUNTER
Plastics appointment with Dr. David scheduled for 2/19/2024 at 1030.  Patient is aware of this appointment.

## 2024-02-07 NOTE — NURSING
Breast Nurse Navigator Note    Distress Screening Tool  Distress Score    Distress Score: 8    Met with patient after consult with Dr. Romero. Patient's sister present during the consultation.  Referred to the following outside resources: [employer] and [facility]. Verbalized understanding that these resources may provide support throughout the course of her treatment. Patient amenable to receiving additional support and gave her permission to be referred to these organizations.   Breast Cancer Education: Breast Cancer Binder given to the patient.   All questions answers to the patient's satisfaction.

## 2024-02-07 NOTE — ASSESSMENT & PLAN NOTE
Recommend BL Breast MRI - re: evaluation of extent of disease  Recommend Genetic Testing - re: newly diagnosed breast cancer and family history.   Follow-up after imaging and testing results.

## 2024-02-09 ENCOUNTER — TELEPHONE (OUTPATIENT)
Dept: SURGERY | Facility: CLINIC | Age: 54
End: 2024-02-09
Payer: OTHER GOVERNMENT

## 2024-02-09 NOTE — TELEPHONE ENCOUNTER
Patient called and results explained.    Will schedule a follow-up visit. Genetics results still pending.      All of questions were answered    Christina Romero MD  Breast Surgical Oncology

## 2024-02-15 ENCOUNTER — OFFICE VISIT (OUTPATIENT)
Dept: SURGERY | Facility: CLINIC | Age: 54
End: 2024-02-15
Payer: OTHER GOVERNMENT

## 2024-02-15 VITALS
TEMPERATURE: 98 F | RESPIRATION RATE: 18 BRPM | BODY MASS INDEX: 32.06 KG/M2 | HEIGHT: 64 IN | DIASTOLIC BLOOD PRESSURE: 80 MMHG | WEIGHT: 187.81 LBS | HEART RATE: 72 BPM | OXYGEN SATURATION: 97 % | SYSTOLIC BLOOD PRESSURE: 121 MMHG

## 2024-02-15 DIAGNOSIS — C50.411 MALIGNANT NEOPLASM OF UPPER-OUTER QUADRANT OF RIGHT BREAST IN FEMALE, ESTROGEN RECEPTOR POSITIVE: Primary | ICD-10-CM

## 2024-02-15 DIAGNOSIS — Z17.0 MALIGNANT NEOPLASM OF UPPER-OUTER QUADRANT OF RIGHT BREAST IN FEMALE, ESTROGEN RECEPTOR POSITIVE: Primary | ICD-10-CM

## 2024-02-15 PROCEDURE — 99999 PR PBB SHADOW E&M-EST. PATIENT-LVL V: CPT | Mod: PBBFAC,,, | Performed by: SURGERY

## 2024-02-15 PROCEDURE — 99215 OFFICE O/P EST HI 40 MIN: CPT | Mod: S$PBB,,, | Performed by: SURGERY

## 2024-02-15 PROCEDURE — 99215 OFFICE O/P EST HI 40 MIN: CPT | Mod: PBBFAC | Performed by: SURGERY

## 2024-02-15 RX ORDER — MINERAL OIL
1 ENEMA (ML) RECTAL EVERY MORNING
COMMUNITY

## 2024-02-15 RX ORDER — AZELASTINE 1 MG/ML
SPRAY, METERED NASAL
COMMUNITY
Start: 2023-08-23

## 2024-02-15 RX ORDER — FERROUS SULFATE TAB 325 MG (65 MG ELEMENTAL FE) 325 (65 FE) MG
TAB ORAL EVERY OTHER DAY
COMMUNITY

## 2024-02-15 RX ORDER — METFORMIN HYDROCHLORIDE 500 MG/1
500 TABLET ORAL 2 TIMES DAILY WITH MEALS
COMMUNITY
Start: 2023-08-03

## 2024-02-15 RX ORDER — INSULIN PUMP SYRINGE, 3 ML
EACH MISCELLANEOUS
COMMUNITY
Start: 2023-08-03 | End: 2024-08-02

## 2024-02-15 NOTE — PROGRESS NOTES
[facility] Breast Surg  Breast Surgical Oncology  Follow-Up Patient Office Visit       Referring Provider: No ref. provider found  PCP: Jani Rutherford DO   Medical Oncologist: No care team member to display   Radiation Oncologist: No care team member to display   Other Care Providers:     Chief Complaint:   Chief Complaint   Patient presents with    Follow-up     Follow up visit w/MRI results        Subjective:   Treatment History:  None      Interval History:  2/15/2024 - Brenden Mccauley presents after recent MRI and for additional discussion. Genetic testing results were not available at the time of this visit.    HPI:  Brenden Mccauley is a 53 y.o. female who presents on 2024 for evaluation of newly diagnosed right  breast cancer. She has a history of reduction mammoplasty in  as well as previous right breast biopsy.     A detailed patient history was obtained and reviewed. She currently denies any breast issues including rashes, redness, pain, swelling, nipple discharge, or new lumps/masses.    MG breast density: Category C (heterogeneously dense)     Imagin2023 BL SC MG at [facility]= which revealed on R mg, there is an irregular mass with associated calcifications at 9 o'clock posterior depth.  There are calcifications in the right breast lower inner quadrant, middle depth. There is an asymmetry in the medial right breast, anterior depth.     On L MG, there are calcifications noted at 3 o'clock, posterior depth, in the medial left breast, anterior to middle depth.  There is an asymmetry with possible associated architectural distortion in the medial left breast, middle depth. BIRADS-0; additional imaging needed    2. 1/3/2024 BL DG MG/ BL US BREAST COMPLETE at [facility]-        Right MG- at 10 o'clock middle/posterior depth, there is a persistent 1.4 cm irregular mass with spiculated margins and associated architectural distortion. There  are punctate and amorphous calcifications associated with this mass. There are heterogeneous, punctate, and amorphous calcifications in a diffuse distribution throughout the right breast.  The most concentrated region of calcifications is located in the central/superior medial right breast anterior to middle depths. The asymmetry in the medial breast, anterior depth, effaces into normal-appearing fibroglandular tissue.   There are diffuse post surgical changes of the right breast related to previous reduction mammoplasty.          Right US-  At 10 o'clock 4 cm FN, there is a 1.4 cm x 0.9 cm x 1.4 cm irregular hypoechoic mass with spiculated and indistinct margins, posterior shadowing, associated architectural distortion, and associated echogenic calcifications. Benign simple cysts seen at 11 o'clock and 1 o'clock subareolar. The right axilla demonstrates no significant right axillary adenopathy.        Left MG- there are heterogeneous, punctate, and amorphous calcifications in a diffuse distribution throughout the left breast. The most concentrated groupings of calcifications are located in the upper inner and upper outer quadrants of the left breast middle/posterior depth. The asymmetry with possible associated architectural distortion seen in the medial left breast effaced into normal-appearing fibroglandular tissue on spot compression tomosynthesis images.        Left US- there are two morphologically similar parallel oval avascular hypoechoic masses with circumscribed margins  measuring 0.9 cm x 0.2 cm x 0.5 cm; at 4 o'clock 5 cm FN and 1.0 cm x 0.3 cm x 0.8 cm; at 4 o'clock 3 cm from the nipple.  These masses are without significant change mammographically dating back at least to the 2021 exam, and are also likely without significant change dating back to the 2017 exam allowing for differences in technique and interval reduction mammoplasty.  No suspicious sonographic abnormality is evident in the left breast..  Left axilla demonstrates no significant left axillary adenopathy. BIRADS-4 suspicious;biopsy recommended     3. 2/8/2024 BL Breast MRI -   MRI Breast w/wo Contrast, w/CAD, Bilateral 02/08/2024  IMPRESSION: KNOWN BIOPSY PROVEN MALIGNANCY  1) Right breast upper outer quadrant, lower outer quadrant, and central region posterior to middle depths irregular heterogeneously enhancing spiculated mass with regional clumped non mass enhancement extending away superiorly, inferiorly, and anteriorly measures 5.7 x 2.6 x 7.2 cm ([id number]) in total extent and correlates with biopsy-proven malignancy.  BI-RADS 6: Known biopsy-proven malignancy.    2) No abnormal enhancement associated with the recent benign stereotactic guided core needle biopsy sites for calcifications in both breasts (right breast 3:00 axis middle depth and left breast upper inner quadrant middle depth).  There are multiple hematomas in the right breast upper inner and lower inner quadrants in the anterior depth related to the 3:00 axis middle depth core needle biopsy.  BI-RADS 2: Benign.    3) No MRI evidence of malignancy in the left breast.      Pathology:  1/25/2024 Ultrasound-guided Core Needle Biopsy Right Breast Mass 10 o'clock 4 cm FN-        - Invasive mammary carcinoma with mixed ductal and lobular features, grade 2 (measuring 9 mm) with associated calcifications          ER 86%          DC 80%          HER2 aden 1+          Ki67 23%    2. 1/25/2024 Stereotactic guided Core Needle Biopsy Right Breast 3 o'clock Calcifications-       -Stromal fibrosis, cystic dilation of ducts, sclerosing adenosis and microcalcifications         -No evidence of malignancy    3. 1/25/2024 Stereotactic guided Core Needle Biopsy Left Breast UIQ Middle Depth Calcifications       - Stromal fibrosis, cystic dilation of ducts, apocrine metaplasia, sclerosing adenosis, fibroadenomatoid change, microcalcifications, and mild ductal epithelial hyperplasia of the usual type         -No evidence of malignancy        OB/GYN History:  Age at Menarche Onset: 10  Menopausal Status: postmenopausal, LMP: No LMP recorded. Patient has had a hysterectomy.  Hysterectomy/Oophorectomy: hysterectomy without BSO, at age 40  Hormonal birth control (duration): Yes OCP (estrogen/progesterone).   Pregnancy History:   Age at first live birth: 18  Hormone Replacement Therapy: Yes, Oral Estrogen  Patient did not breast feed.  Patient denies nipple discharge.   Patient admits to to previous breast biopsy- Right Breast  Benign  Patient denies to a personal history of breast cancer.    Other Relevant History:  Prior thoracic RT: none  Genetic testing: No  [employer]ken[employer] descent: No    Family History:  Family History   Problem Relation Age of Onset    Diabetes type I Mother     Multiple myeloma Mother     Cancer Mother 55        Multiple myeloma    Diabetes Mother     Hypertension Mother     Kidney disease Mother     Alzheimer's disease Father             No Known Problems Sister     No Known Problems Brother     Pancreatic cancer Maternal Grandmother 70    Breast cancer Paternal Grandmother 55    Liver cancer Paternal Grandmother 55    Breast cancer Paternal Aunt 62        Past History:  Past Medical History:   Diagnosis Date    Abnormal uterine bleeding     Anemia     Anxiety     Depression     Diabetes mellitus     Endometriosis of uterus     Fibroid     Heart attack     Hyperlipidemia     Menopause 2017    Pregnancy 1987    Miscarriage        Past Surgical History:   Procedure Laterality Date    ABDOMINAL SURGERY      APPENDECTOMY      BREAST SURGERY      CERVICAL BIOPSY  W/ LOOP ELECTRODE EXCISION       SECTION      COSMETIC SURGERY      Breast reduction    DILATION AND CURETTAGE OF UTERUS      ENDOMETRIAL ABLATION      gastric sleeve      HYSTERECTOMY      HYSTEROSCOPY      JOINT REPLACEMENT      LUMBAR LAMINECTOMY N/A 2023    Procedure: LAMINECTOMY,  SPINE, LUMBAR L4 SNS:SSEP/EMG;  Surgeon: Kamlesh Gomez MD;  Location: [facility] OR;  Service: Orthopedics;  Laterality: N/A;    LUMBAR LAMINECTOMY N/A 08/22/2023    Procedure: LAMINECTOMY, SPINE, LUMBAR L4;  Surgeon: Kamlesh Gomez MD;  [facility];  Service: Neurosurgery;  Laterality: N/A;    SPINE SURGERY  2023    TONSILLECTOMY      TOTAL KNEE ARTHROPLASTY Bilateral     TUBAL LIGATION  1992    WISDOM TOOTH EXTRACTION          Social History     Socioeconomic History    Marital status: Single   Tobacco Use    Smoking status: Every Day     Current packs/day: 0.50     Average packs/day: 0.5 packs/day for 5.0 years (2.5 ttl pk-yrs)     Types: Cigarettes    Smokeless tobacco: Never    Tobacco comments:     1 pk every 3 days    Substance and Sexual Activity    Alcohol use: Not Currently     Alcohol/week: 1.0 standard drink of alcohol     Types: 1 Glasses of wine per week    Drug use: Never    Sexual activity: Not Currently     Partners: Male     Birth control/protection: Abstinence        Body mass index is 32.24 kg/m².     Allergy/Medications:   Review of patient's allergies indicates:   Allergen Reactions    Latex, natural rubber Hives, Anxiety, Itching, Palpitations and Swelling     Other Reaction(s): Unknown    Tomato Hives, Rash and Swelling    Tomato (solanum lycopersicum) Hives and Swelling          Current Outpatient Medications:     aspirin (ECOTRIN) 81 MG EC tablet, Take 81 mg by mouth once daily., Disp: , Rfl:     azelastine (ASTELIN) 137 mcg (0.1 %) nasal spray, SMARTSIG:Both Nares, Disp: , Rfl:     blood sugar diagnostic (ONETOUCH ULTRA TEST) Strp, CHECK SUGARS DAILY, Disp: , Rfl:     blood sugar diagnostic (TRUE METRIX GLUCOSE TEST STRIP) Strp, CHECK SUGARS DAILY, Disp: , Rfl:     blood-glucose meter kit, Check sugars daily. For T2DM without insulin (E11.9), Disp: , Rfl:     busPIRone (BUSPAR) 30 MG Tab, Take 30 mg by mouth 2 (two) times a day., Disp: , Rfl:     carvediloL (COREG) 3.125 MG  tablet, Take 3.125 mg by mouth 2 (two) times daily., Disp: , Rfl:     celecoxib (CELEBREX) 200 MG capsule, , Disp: , Rfl:     etodolac (LODINE) 200 MG Cap, Take 200 mg by mouth 2 (two) times daily., Disp: , Rfl:     FEROSUL 325 mg (65 mg iron) Tab tablet, Take by mouth every other day., Disp: , Rfl:     fexofenadine (ALLEGRA) 180 MG tablet, Take 1 tablet by mouth every morning., Disp: , Rfl:     lamoTRIgine (LAMICTAL) 100 MG tablet, Take 100 mg by mouth once daily., Disp: , Rfl:     metFORMIN (GLUCOPHAGE) 500 MG tablet, Take 500 mg by mouth., Disp: , Rfl:     montelukast (SINGULAIR) 10 mg tablet, Take 10 mg by mouth once daily., Disp: , Rfl:     oxyCODONE (ROXICODONE) 5 MG immediate release tablet, Take 1 tablet (5 mg total) by mouth every 4 (four) hours as needed for Pain., Disp: 20 tablet, Rfl: 0    ramelteon (ROZEREM) 8 mg tablet, Take 8 mg by mouth once daily., Disp: , Rfl:     rosuvastatin (CRESTOR) 10 MG tablet, Take 10 mg by mouth once daily., Disp: , Rfl:     sertraline 200 mg Cap, Take 200 mg by mouth once daily., Disp: , Rfl:     traMADoL (ULTRAM) 50 mg tablet, Take 1 tablet (50 mg total) by mouth every 6 (six) hours., Disp: 60 tablet, Rfl: 0    vitamin D (VITAMIN D3) 1000 units Tab, Take 1,000 Units by mouth once daily., Disp: , Rfl:     ALPRAZolam (XANAX) 0.25 MG tablet, Take by mouth., Disp: , Rfl:        Review of Systems:  Review of Systems   Constitutional:  Negative for chills, fever and weight loss.   HENT:  Negative for sore throat.    Eyes:  Negative for blurred vision and double vision.   Respiratory:  Negative for cough and shortness of breath.    Cardiovascular:  Negative for chest pain, palpitations and leg swelling.   Gastrointestinal:  Positive for constipation. Negative for abdominal pain, diarrhea, heartburn, nausea and vomiting.        Takes meds 1 / week   Genitourinary:  Negative for dysuria, flank pain, frequency, hematuria and urgency.   Musculoskeletal:  Negative for back pain,  "joint pain and myalgias.   Neurological:  Positive for headaches. Negative for dizziness.        Been having for years   Endo/Heme/Allergies:  Does not bruise/bleed easily.   Psychiatric/Behavioral:  Negative for depression. The patient is not nervous/anxious.            Objective:     Vitals:  Blood pressure 121/80, pulse 72, temperature 97.7 °F (36.5 °C), temperature source Oral, resp. rate 18, height 5' 4" (1.626 m), weight 85.2 kg (187 lb 12.8 oz), SpO2 97 %.      Physical Exam:  General: The patient is awake, alert and oriented times three. The patient is well nourished and in no acute distress.   Neck: There is no evidence of palpable cervical, supraclavicular or axillary adenopathy. The neck is supple. The thyroid is not enlarged.   Musculoskeletal: The patient has a normal range of motion of her bilateral upper extremities.   Chest: Examination of the chest wall fails to reveal any obvious abnormalities. The lungs are clear to auscultation bilaterally without rales, rhonchi, or wheezing.   Cardiovascular: The heart has a regular rate and rhythm without murmurs, gallops or rubs.   Breast:   Right: Examination of right breast fails to reveal any dominant masses or areas of significant focal nodularity. The nipple is everted without evidence of discharge. There is no skin dimpling with movement of the pectoralis. There is no significant skin changes overlying the breast.   Left: Examination of the left breast fails to reveal any dominant masses or areas of significant focal nodularity. The nipple is everted without evidence of discharge. There is no skin dimpling with movement of the pectoralis. There is no significant skin changes overlying the breast.  Abdomen: The abdomen is soft, flat, nontender and nondistended with no palpable masses or organomegaly.  Integumentary: no rashes or skin lesions present  Neurologic: cranial nerves intact, no signs of peripheral neurological deficit, motor/sensory function " intact    Assessment and Plan:     Encounter Diagnoses   Name Primary?    Malignant neoplasm of upper-outer quadrant of right breast in female, estrogen receptor positive Yes        We discussed her MRI results in detail. Genetic testing results are still pending.      We again briefly discussed the need to proceed with local and systemic treatment.      Local Surgical Treatment options:     Breast Surgical Procedures  Breast Conservation Surgery (Partial Mastectomy or Lumpectomy)  Palpation-Guided - removal of breast cancer which is felt on clinical exam  Localization-Guided - required placement of a MagSeed and/or wire (if needed) in the area of concern to allow for identification during surgery. This will take place prior to surgery (usually a few days before). During surgery the MagSeed is detected with a probe and the cancer area is removed along with the MagSeed and/or previously placed clip.   Incisions are usually closed with dissolving stitches, followed by glue and Dermabond.   Mastectomy  Simple - includes removal of breast skin, subcutaneous (fat) tissue, and nipple areolar complex.   Skin-Sparing - includes removal of the breast skin while sparing enough for reconstruction. It also is involves removal of the subcutaneous (fat) tissue and nipple areolar complex.  Nipple Sparing - includes removal of all the breast tissue underneath the nipple, areola, and breast skin is removed. The tissue beneath the nipple and areola are checked for cancer. If cancer is detected, the nipple and areola are then removed.   Surgical Risk include surgical site infections, hematoma or seroma requiring operation, necrosis of nipple-areola and/or mastectomy flaps requiring debridement or hyperbaric therapy, unplanned re-operations, and delay in adjuvant treatments.    Drain placement may be necessary after mastectomy and can remain in place for greater than 10-14 day, occasionally longer.     Axillary Surgical  "Procedures  [place] Lymph Node Biopsy  Technetium-99 - a clear substance injected around the nipple and areola on the day of surgery prior to surgery starting which provides a sound "road" map to the lymph nodes needing to be removed for further examination.  Blue Dye - either Methylene Blue or Isosulfan is given in the operating room and provides a color "road" map to the lymph nodes needing to be removed for further examination.  MagTrace (Superparamagnetic oxide) - is used to assist with lymph node mapping as well.  Risk - axillary swelling related to bleeding or serous fluid, infection, nerve damage (temporary or permanent), lymphedema (5-6% risk), additional surgery related to final pathology or complications from the procedure  Axillary Lymph Node Dissection  Risk including  the above plus nerve injury which could be permanent and lymphedema risk above 20-25%     Reconstruction Procedures  Implant- based  Autologous-based  Combination  Immediate versus Delayed  Oncoplastic reduction     The pros and cons of these reconstructive methods were addressed. A second operation maybe required before the final completion of the reconstructive process. I also explained to the patient that the reconstructive options are generally by law required to be covered by insurance and would be considered part of a cancer operation and not a cosmetic operation.     Radiation Treatment Options:  Whole-Breast  Partial Breast      Systemic treatment options:  Hormone Blocker/Endocrine Therapy  Tamoxifen  Raloxifene  Aromatase Inhibitor   Letrozole (Femara)   Anastrozole (Arimidex)   Exemestane (Aromasin)     2. Chemotherapy     3. Immunomodulator & Target Therapies (such as Herceptin)      After discussion, she is electing to proceed with mastectomy for treatment of her right breast cancer. She wishes to have have bilateral simple mastectomy with reconstruction. She will be seeing Dr. David on 2/19/24.       She has a history of " smoking and will definitely need to stop at least 4 - 6 weeks prior to surgery, specifically if desiring reconstruction.            Plan:     Problem List Items Addressed This Visit          Oncology    Malignant neoplasm of upper-outer quadrant of right breast in female, estrogen receptor positive - Primary    Current Assessment & Plan     Plastic Surgery - scheduled with Dr. David 2/19/24  Surgery - Bilateral simple mastectomy with right SLNB, possible ALND, TE Reconstruction  Tentative Date: TBD (considering 2/29 or 3/1, however smoking may push this back)  Preop - CBC, CMP, EKG  Surgical instructions and information were discussed in detail  Smoking cessation - she will need to stop smoking                All of questions were answered    Christina Romero MD  Breast Surgical Oncology     OFFICE VISIT CODING:    Non-face-to-face time included:  Yes Preparing to see the patient such as reviewing the patient record  Yes Obtaining and reviewing separately obtained history  Yes Independently interpreting results  Yes Documenting clinical information in electronic health record  No Ordering appropriate medications  Yes Ordering appropriate tests  Yes Ordering appropriate procedures (including follow-up)  Yes Referring and communicating with other health care professionals (not separately reported)  Yes Care Coordination (not separately reported)    Face-to-face time included:  Yes Performing a medically necessary appropriate history, examination, and/or evaluation  Yes Communicating results to the patient/family/caregiver  Yes Counseling and educating the patient/family/caregiver  Yes Answering patient/family/caregiver questions    Total Time: 45 minutes    Total time includes both face-to-face and non-face-to-face time personally spent by myself on the day of the visit.

## 2024-02-17 NOTE — ASSESSMENT & PLAN NOTE
Plastic Surgery - scheduled with Dr. David 2/19/24  Surgery - Bilateral simple mastectomy with right SLNB, possible ALND, TE Reconstruction  Tentative Date: TBD (considering 2/29 or 3/1, however smoking may push this back)  Preop - CBC, CMP, EKG  Surgical instructions and information were discussed in detail  Smoking cessation - she will need to stop smoking

## 2024-02-20 ENCOUNTER — PATIENT MESSAGE (OUTPATIENT)
Dept: ORTHOPEDIC SURGERY | Facility: CLINIC | Age: 54
End: 2024-02-20
Payer: OTHER GOVERNMENT

## 2024-02-22 ENCOUNTER — TELEPHONE (OUTPATIENT)
Dept: SURGERY | Facility: CLINIC | Age: 54
End: 2024-02-22
Payer: OTHER GOVERNMENT

## 2024-02-22 DIAGNOSIS — Z17.0 MALIGNANT NEOPLASM OF UPPER-OUTER QUADRANT OF RIGHT BREAST IN FEMALE, ESTROGEN RECEPTOR POSITIVE: Primary | ICD-10-CM

## 2024-02-22 DIAGNOSIS — C50.411 MALIGNANT NEOPLASM OF UPPER-OUTER QUADRANT OF RIGHT BREAST IN FEMALE, ESTROGEN RECEPTOR POSITIVE: Primary | ICD-10-CM

## 2024-02-22 NOTE — TELEPHONE ENCOUNTER
Lloyd,Can you call her and let her know her genetics have returned. They did not show mutations related to breast cancer, but did come back with a mutation that we would like to refer her to [facility] to discuss further.Thank you----- Message -----From: Lloyd Martinez RNSent: 2/19/2024  10:52 AM CSTTo: ARNIE Staleyubject: Edit                                        The scan below was edited by Lloyd Martinez RN on 02/19/2024 at 10:52; it is attached to the following: the 02/06/2024 Office Visit with Christina Romero MD      Spoke with patient regarding her Genetic results.  Informed patient that we would refer her to Dr. Ferris to discuss mutation found.  Patient verbalized understanding.

## 2024-02-29 DIAGNOSIS — C50.411 MALIGNANT NEOPLASM OF UPPER-OUTER QUADRANT OF RIGHT BREAST IN FEMALE, ESTROGEN RECEPTOR POSITIVE: Primary | ICD-10-CM

## 2024-02-29 DIAGNOSIS — Z17.0 MALIGNANT NEOPLASM OF UPPER-OUTER QUADRANT OF RIGHT BREAST IN FEMALE, ESTROGEN RECEPTOR POSITIVE: Primary | ICD-10-CM

## 2024-02-29 RX ORDER — SODIUM CHLORIDE, SODIUM LACTATE, POTASSIUM CHLORIDE, CALCIUM CHLORIDE 600; 310; 30; 20 MG/100ML; MG/100ML; MG/100ML; MG/100ML
INJECTION, SOLUTION INTRAVENOUS CONTINUOUS
Status: CANCELLED | OUTPATIENT
Start: 2024-02-29

## 2024-02-29 NOTE — H&P (VIEW-ONLY)
Problem List Items Addressed This Visit          Oncology    Malignant neoplasm of upper-outer quadrant of right breast in female, estrogen receptor positive - Primary    Current Assessment & Plan     Plastic Surgery - scheduled with Dr. David 2/26/24  Surgery - Bilateral simple mastectomy with right SLNB, possible ALND, TE Reconstruction  Tentative Date: 3/15/2024  Preop - CBC, CMP, EKG  Surgical instructions and information were discussed in detail  Smoking cessation - she will need to stop smoking         Relevant Orders    Case Request Operating Room: MASTECTOMY, SIMPLE - BILATERAL, BIOPSY, LYMPH NODE, SENTINEL - RIGHT, LYMPHADENECTOMY, AXILLARY - RIGHT (Completed)    Comprehensive metabolic panel    CBC auto differential    EKG 12-lead           Christina Romero MD  Breast Surgical Oncology

## 2024-02-29 NOTE — ASSESSMENT & PLAN NOTE
Plastic Surgery - scheduled with Dr. David 2/26/24  Surgery - Bilateral simple mastectomy with right SLNB, possible ALND, TE Reconstruction  Tentative Date: 3/15/2024  Preop - CBC, CMP, EKG  Surgical instructions and information were discussed in detail  Smoking cessation - she will need to stop smoking

## 2024-03-02 RX ORDER — TRAZODONE HYDROCHLORIDE 100 MG/1
100 TABLET ORAL NIGHTLY
COMMUNITY

## 2024-03-02 RX ORDER — MELOXICAM 15 MG/1
15 TABLET ORAL DAILY
COMMUNITY

## 2024-03-02 RX ORDER — PANTOPRAZOLE SODIUM 20 MG/1
20 TABLET, DELAYED RELEASE ORAL DAILY
COMMUNITY

## 2024-03-12 ENCOUNTER — LAB VISIT (OUTPATIENT)
Dept: LAB | Facility: HOSPITAL | Age: 54
End: 2024-03-12
Attending: SURGERY
Payer: OTHER GOVERNMENT

## 2024-03-12 DIAGNOSIS — Z17.0 MALIGNANT NEOPLASM OF UPPER-OUTER QUADRANT OF RIGHT BREAST IN FEMALE, ESTROGEN RECEPTOR POSITIVE: ICD-10-CM

## 2024-03-12 DIAGNOSIS — C50.411 MALIGNANT NEOPLASM OF UPPER-OUTER QUADRANT OF RIGHT BREAST IN FEMALE, ESTROGEN RECEPTOR POSITIVE: ICD-10-CM

## 2024-03-12 LAB
ALBUMIN SERPL-MCNC: 3.6 G/DL (ref 3.5–5)
ALBUMIN/GLOB SERPL: 1 RATIO (ref 1.1–2)
ALP SERPL-CCNC: 111 UNIT/L (ref 40–150)
ALT SERPL-CCNC: 12 UNIT/L (ref 0–55)
AST SERPL-CCNC: 17 UNIT/L (ref 5–34)
BASOPHILS # BLD AUTO: 0.02 X10(3)/MCL
BASOPHILS NFR BLD AUTO: 0.3 %
BILIRUB SERPL-MCNC: 0.3 MG/DL
BUN SERPL-MCNC: 11.3 MG/DL (ref 9.8–20.1)
CALCIUM SERPL-MCNC: 9.2 MG/DL (ref 8.4–10.2)
CHLORIDE SERPL-SCNC: 106 MMOL/L (ref 98–107)
CO2 SERPL-SCNC: 26 MMOL/L (ref 22–29)
CREAT SERPL-MCNC: 0.77 MG/DL (ref 0.55–1.02)
EOSINOPHIL # BLD AUTO: 0.04 X10(3)/MCL (ref 0–0.9)
EOSINOPHIL NFR BLD AUTO: 0.5 %
ERYTHROCYTE [DISTWIDTH] IN BLOOD BY AUTOMATED COUNT: 21 % (ref 11.5–17)
GFR SERPLBLD CREATININE-BSD FMLA CKD-EPI: >60 MLS/MIN/1.73/M2
GLOBULIN SER-MCNC: 3.7 GM/DL (ref 2.4–3.5)
GLUCOSE SERPL-MCNC: 132 MG/DL (ref 74–100)
HCT VFR BLD AUTO: 34.2 % (ref 37–47)
HGB BLD-MCNC: 10.6 G/DL (ref 12–16)
IMM GRANULOCYTES # BLD AUTO: 0.02 X10(3)/MCL (ref 0–0.04)
IMM GRANULOCYTES NFR BLD AUTO: 0.3 %
LYMPHOCYTES # BLD AUTO: 3.11 X10(3)/MCL (ref 0.6–4.6)
LYMPHOCYTES NFR BLD AUTO: 40.4 %
MCH RBC QN AUTO: 25.5 PG (ref 27–31)
MCHC RBC AUTO-ENTMCNC: 31 G/DL (ref 33–36)
MCV RBC AUTO: 82.4 FL (ref 80–94)
MONOCYTES # BLD AUTO: 0.38 X10(3)/MCL (ref 0.1–1.3)
MONOCYTES NFR BLD AUTO: 4.9 %
NEUTROPHILS # BLD AUTO: 4.12 X10(3)/MCL (ref 2.1–9.2)
NEUTROPHILS NFR BLD AUTO: 53.6 %
NRBC BLD AUTO-RTO: 0 %
PLATELET # BLD AUTO: 494 X10(3)/MCL (ref 130–400)
PLATELETS.RETICULATED NFR BLD AUTO: 6.3 % (ref 0.9–11.2)
PMV BLD AUTO: 10.1 FL (ref 7.4–10.4)
POTASSIUM SERPL-SCNC: 4.3 MMOL/L (ref 3.5–5.1)
PROT SERPL-MCNC: 7.3 GM/DL (ref 6.4–8.3)
RBC # BLD AUTO: 4.15 X10(6)/MCL (ref 4.2–5.4)
SODIUM SERPL-SCNC: 141 MMOL/L (ref 136–145)
WBC # SPEC AUTO: 7.69 X10(3)/MCL (ref 4.5–11.5)

## 2024-03-12 PROCEDURE — 36415 COLL VENOUS BLD VENIPUNCTURE: CPT

## 2024-03-12 PROCEDURE — 93005 ELECTROCARDIOGRAM TRACING: CPT

## 2024-03-13 ENCOUNTER — PROCEDURE VISIT (OUTPATIENT)
Dept: SURGERY | Facility: CLINIC | Age: 54
End: 2024-03-13
Payer: OTHER GOVERNMENT

## 2024-03-13 VITALS
HEIGHT: 64 IN | BODY MASS INDEX: 32.1 KG/M2 | OXYGEN SATURATION: 97 % | SYSTOLIC BLOOD PRESSURE: 116 MMHG | HEART RATE: 78 BPM | WEIGHT: 188 LBS | DIASTOLIC BLOOD PRESSURE: 79 MMHG | RESPIRATION RATE: 18 BRPM | TEMPERATURE: 99 F

## 2024-03-13 DIAGNOSIS — Z17.0 MALIGNANT NEOPLASM OF UPPER-OUTER QUADRANT OF RIGHT BREAST IN FEMALE, ESTROGEN RECEPTOR POSITIVE: Primary | ICD-10-CM

## 2024-03-13 DIAGNOSIS — C50.411 MALIGNANT NEOPLASM OF UPPER-OUTER QUADRANT OF RIGHT BREAST IN FEMALE, ESTROGEN RECEPTOR POSITIVE: Primary | ICD-10-CM

## 2024-03-13 LAB
OHS QRS DURATION: 82 MS
OHS QTC CALCULATION: 395 MS

## 2024-03-13 PROCEDURE — 99499 UNLISTED E&M SERVICE: CPT | Mod: ,,,

## 2024-03-13 PROCEDURE — 38999 UNLISTD PX HEMIC/LYMPHTC SYS: CPT | Mod: S$PBB,,,

## 2024-03-13 RX ORDER — MUPIROCIN 20 MG/G
OINTMENT TOPICAL
COMMUNITY
Start: 2024-03-12

## 2024-03-13 RX ORDER — ONDANSETRON 4 MG/1
TABLET, ORALLY DISINTEGRATING ORAL
COMMUNITY
Start: 2024-03-12

## 2024-03-13 RX ORDER — OXYCODONE AND ACETAMINOPHEN 5; 325 MG/1; MG/1
TABLET ORAL
COMMUNITY
Start: 2024-03-12

## 2024-03-13 RX ORDER — CYCLOBENZAPRINE HCL 5 MG
5 TABLET ORAL
COMMUNITY
Start: 2024-03-12

## 2024-03-13 RX ORDER — SULFAMETHOXAZOLE AND TRIMETHOPRIM 800; 160 MG/1; MG/1
1 TABLET ORAL 2 TIMES DAILY
COMMUNITY
Start: 2024-03-12

## 2024-03-13 NOTE — PROCEDURES
DATE OF SERVICE: 03/13/2024    Provider: Mili Rolon PA-C    PREOPERATIVE DIAGNOSIS:   Encounter Diagnoses   Name Primary?    Malignant neoplasm of upper-outer quadrant of right breast in female, estrogen receptor positive Yes       POSTOPERATIVE DIAGNOSIS:   Encounter Diagnoses   Name Primary?    Malignant neoplasm of upper-outer quadrant of right breast in female, estrogen receptor positive Yes       PROCEDURE: Injection of non-radioactive, non-contrast/non-visualization agent - 2mL of superparamagnetic iron oxide (SPIO) nanoparticles (56 mg Iron and 64 mg Carboxydextran/2 mL)    ANESTHESIA:  5 mL of 1% Lidocaine was injected at the lateral subareolar border right breast     TECHNIQUE:    1. 2.0 mL of superparamagnetic iron oxide (SPIO) nanoparticles was injected subareolar in the right breast for [place] node localization with a SentiMag probe.       PLAN:  right [place] Lymph Node Localization performed today as above with planned SentiMag probe localization anticipated at the time of right SLNB        All of her questions were answered.     Mili Rolon PA-C

## 2024-03-14 ENCOUNTER — PATIENT MESSAGE (OUTPATIENT)
Dept: ADMINISTRATIVE | Facility: OTHER | Age: 54
End: 2024-03-14
Payer: OTHER GOVERNMENT

## 2024-03-14 DIAGNOSIS — Z98.890 POST-OPERATIVE STATE: Primary | ICD-10-CM

## 2024-03-15 ENCOUNTER — ANESTHESIA EVENT (OUTPATIENT)
Dept: SURGERY | Facility: HOSPITAL | Age: 54
End: 2024-03-15
Payer: OTHER GOVERNMENT

## 2024-03-15 ENCOUNTER — HOSPITAL ENCOUNTER (OUTPATIENT)
Facility: HOSPITAL | Age: 54
Discharge: HOME | End: 2024-03-15
Attending: SURGERY | Admitting: SURGERY
Payer: OTHER GOVERNMENT

## 2024-03-15 ENCOUNTER — ANESTHESIA (OUTPATIENT)
Dept: SURGERY | Facility: HOSPITAL | Age: 54
End: 2024-03-15
Payer: OTHER GOVERNMENT

## 2024-03-15 DIAGNOSIS — Z17.0 MALIGNANT NEOPLASM OF UPPER-OUTER QUADRANT OF RIGHT BREAST IN FEMALE, ESTROGEN RECEPTOR POSITIVE: ICD-10-CM

## 2024-03-15 DIAGNOSIS — Z85.3 PERSONAL HISTORY OF MALIGNANT NEOPLASM OF BREAST: ICD-10-CM

## 2024-03-15 DIAGNOSIS — Z42.1 ENCOUNTER FOR BREAST RECONSTRUCTION FOLLOWING MASTECTOMY: ICD-10-CM

## 2024-03-15 DIAGNOSIS — C50.411 MALIGNANT NEOPLASM OF UPPER-OUTER QUADRANT OF RIGHT BREAST IN FEMALE, ESTROGEN RECEPTOR POSITIVE: ICD-10-CM

## 2024-03-15 LAB
POCT GLUCOSE: 119 MG/DL (ref 70–110)
POCT GLUCOSE: 92 MG/DL (ref 70–110)

## 2024-03-15 PROCEDURE — 19303 MAST SIMPLE COMPLETE: CPT | Mod: AS,50,, | Performed by: PHYSICIAN ASSISTANT

## 2024-03-15 PROCEDURE — 38525 BIOPSY/REMOVAL LYMPH NODES: CPT | Mod: 51,RT,, | Performed by: SURGERY

## 2024-03-15 PROCEDURE — C1729 CATH, DRAINAGE: HCPCS | Performed by: SURGERY

## 2024-03-15 PROCEDURE — 19303 MAST SIMPLE COMPLETE: CPT | Mod: 50,,, | Performed by: SURGERY

## 2024-03-15 PROCEDURE — C9290 INJ, BUPIVACAINE LIPOSOME: HCPCS | Performed by: SURGERY

## 2024-03-15 PROCEDURE — 19357 TISS XPNDR PLMT BRST RCNSTJ: CPT | Mod: 50,AS,, | Performed by: NURSE PRACTITIONER

## 2024-03-15 PROCEDURE — 71000033 HC RECOVERY, INTIAL HOUR: Performed by: SURGERY

## 2024-03-15 PROCEDURE — 63600175 PHARM REV CODE 636 W HCPCS: Performed by: ANESTHESIOLOGY

## 2024-03-15 PROCEDURE — 19357 TISS XPNDR PLMT BRST RCNSTJ: CPT | Mod: 50,,, | Performed by: SURGERY

## 2024-03-15 PROCEDURE — 27201423 OPTIME MED/SURG SUP & DEVICES STERILE SUPPLY: Performed by: SURGERY

## 2024-03-15 PROCEDURE — 63600175 PHARM REV CODE 636 W HCPCS: Performed by: SURGERY

## 2024-03-15 PROCEDURE — 63600175 PHARM REV CODE 636 W HCPCS: Mod: JZ,JG | Performed by: SURGERY

## 2024-03-15 PROCEDURE — 63600175 PHARM REV CODE 636 W HCPCS: Performed by: NURSE ANESTHETIST, CERTIFIED REGISTERED

## 2024-03-15 PROCEDURE — D9220A PRA ANESTHESIA: Mod: ANES,,, | Performed by: ANESTHESIOLOGY

## 2024-03-15 PROCEDURE — 37000008 HC ANESTHESIA 1ST 15 MINUTES: Performed by: SURGERY

## 2024-03-15 PROCEDURE — 36000706: Performed by: SURGERY

## 2024-03-15 PROCEDURE — 36000707: Performed by: SURGERY

## 2024-03-15 PROCEDURE — 25000003 PHARM REV CODE 250: Performed by: NURSE ANESTHETIST, CERTIFIED REGISTERED

## 2024-03-15 PROCEDURE — 15860 IV NJX TST VASC FLO FLAP/GRF: CPT | Mod: 51,,, | Performed by: SURGERY

## 2024-03-15 PROCEDURE — D9220A PRA ANESTHESIA: Mod: CRNA,,, | Performed by: NURSE ANESTHETIST, CERTIFIED REGISTERED

## 2024-03-15 PROCEDURE — 37000009 HC ANESTHESIA EA ADD 15 MINS: Performed by: SURGERY

## 2024-03-15 PROCEDURE — 82962 GLUCOSE BLOOD TEST: CPT | Performed by: SURGERY

## 2024-03-15 PROCEDURE — C1789 PROSTHESIS, BREAST, IMP: HCPCS | Performed by: SURGERY

## 2024-03-15 PROCEDURE — 71000016 HC POSTOP RECOV ADDL HR: Performed by: SURGERY

## 2024-03-15 PROCEDURE — 25000003 PHARM REV CODE 250: Performed by: ANESTHESIOLOGY

## 2024-03-15 PROCEDURE — 15777 ACELLULAR DERM MATRIX IMPLT: CPT | Mod: 51,59,LT, | Performed by: SURGERY

## 2024-03-15 PROCEDURE — 71000015 HC POSTOP RECOV 1ST HR: Performed by: SURGERY

## 2024-03-15 DEVICE — EXPANDER NATRELLE FH EP 450CC: Type: IMPLANTABLE DEVICE | Site: BREAST | Status: FUNCTIONAL

## 2024-03-15 DEVICE — TISSUE ALLODERM SELECT 1.2-2.0: Type: IMPLANTABLE DEVICE | Site: BREAST | Status: FUNCTIONAL

## 2024-03-15 RX ORDER — CEFAZOLIN SODIUM 1 G/3ML
INJECTION, POWDER, FOR SOLUTION INTRAMUSCULAR; INTRAVENOUS
Status: DISCONTINUED | OUTPATIENT
Start: 2024-03-15 | End: 2024-03-15 | Stop reason: HOSPADM

## 2024-03-15 RX ORDER — MORPHINE SULFATE 4 MG/ML
2 INJECTION, SOLUTION INTRAMUSCULAR; INTRAVENOUS EVERY 4 HOURS PRN
Status: CANCELLED | OUTPATIENT
Start: 2024-03-15

## 2024-03-15 RX ORDER — ONDANSETRON HYDROCHLORIDE 2 MG/ML
4 INJECTION, SOLUTION INTRAVENOUS EVERY 12 HOURS PRN
Status: CANCELLED | OUTPATIENT
Start: 2024-03-15

## 2024-03-15 RX ORDER — ONDANSETRON HYDROCHLORIDE 2 MG/ML
INJECTION, SOLUTION INTRAMUSCULAR; INTRAVENOUS
Status: DISCONTINUED | OUTPATIENT
Start: 2024-03-15 | End: 2024-03-15

## 2024-03-15 RX ORDER — ROCURONIUM BROMIDE 10 MG/ML
INJECTION, SOLUTION INTRAVENOUS
Status: DISCONTINUED | OUTPATIENT
Start: 2024-03-15 | End: 2024-03-15

## 2024-03-15 RX ORDER — HYDROMORPHONE HYDROCHLORIDE 2 MG/ML
0.2 INJECTION, SOLUTION INTRAMUSCULAR; INTRAVENOUS; SUBCUTANEOUS EVERY 5 MIN PRN
Status: DISCONTINUED | OUTPATIENT
Start: 2024-03-15 | End: 2024-03-15 | Stop reason: HOSPADM

## 2024-03-15 RX ORDER — CEFAZOLIN SODIUM 2 G/50ML
2 SOLUTION INTRAVENOUS
Status: CANCELLED | OUTPATIENT
Start: 2024-03-15 | End: 2024-03-16

## 2024-03-15 RX ORDER — OXYCODONE AND ACETAMINOPHEN 5; 325 MG/1; MG/1
1 TABLET ORAL EVERY 4 HOURS PRN
Status: DISCONTINUED | OUTPATIENT
Start: 2024-03-15 | End: 2024-03-15 | Stop reason: HOSPADM

## 2024-03-15 RX ORDER — HYDROCODONE BITARTRATE AND ACETAMINOPHEN 5; 325 MG/1; MG/1
1 TABLET ORAL
Status: DISCONTINUED | OUTPATIENT
Start: 2024-03-15 | End: 2024-03-15 | Stop reason: HOSPADM

## 2024-03-15 RX ORDER — IPRATROPIUM BROMIDE AND ALBUTEROL SULFATE 2.5; .5 MG/3ML; MG/3ML
3 SOLUTION RESPIRATORY (INHALATION) ONCE AS NEEDED
Status: DISCONTINUED | OUTPATIENT
Start: 2024-03-15 | End: 2024-03-15 | Stop reason: HOSPADM

## 2024-03-15 RX ORDER — METHYLENE BLUE 5 MG/ML
INJECTION INTRAVENOUS
Status: DISCONTINUED | OUTPATIENT
Start: 2024-03-15 | End: 2024-03-15 | Stop reason: HOSPADM

## 2024-03-15 RX ORDER — METHYLENE BLUE 5 MG/ML
INJECTION INTRAVENOUS
Status: DISCONTINUED
Start: 2024-03-15 | End: 2024-03-15 | Stop reason: HOSPADM

## 2024-03-15 RX ORDER — GENTAMICIN 40 MG/ML
INJECTION, SOLUTION INTRAMUSCULAR; INTRAVENOUS
Status: DISCONTINUED
Start: 2024-03-15 | End: 2024-03-15 | Stop reason: HOSPADM

## 2024-03-15 RX ORDER — CLINDAMYCIN PHOSPHATE 150 MG/ML
600 INJECTION, SOLUTION INTRAVENOUS
Status: DISCONTINUED | OUTPATIENT
Start: 2024-03-15 | End: 2024-03-15

## 2024-03-15 RX ORDER — FENTANYL CITRATE 50 UG/ML
INJECTION, SOLUTION INTRAMUSCULAR; INTRAVENOUS
Status: DISCONTINUED | OUTPATIENT
Start: 2024-03-15 | End: 2024-03-15

## 2024-03-15 RX ORDER — METOCLOPRAMIDE HYDROCHLORIDE 5 MG/ML
10 INJECTION INTRAMUSCULAR; INTRAVENOUS EVERY 10 MIN PRN
Status: DISCONTINUED | OUTPATIENT
Start: 2024-03-15 | End: 2024-03-15 | Stop reason: HOSPADM

## 2024-03-15 RX ORDER — DIPHENHYDRAMINE HYDROCHLORIDE 50 MG/ML
25 INJECTION INTRAMUSCULAR; INTRAVENOUS EVERY 6 HOURS PRN
Status: DISCONTINUED | OUTPATIENT
Start: 2024-03-15 | End: 2024-03-15 | Stop reason: HOSPADM

## 2024-03-15 RX ORDER — MIDAZOLAM HYDROCHLORIDE 1 MG/ML
2 INJECTION INTRAMUSCULAR; INTRAVENOUS ONCE AS NEEDED
Status: COMPLETED | OUTPATIENT
Start: 2024-03-15 | End: 2024-03-15

## 2024-03-15 RX ORDER — INDOCYANINE GREEN AND WATER 25 MG
KIT INJECTION
Status: DISCONTINUED | OUTPATIENT
Start: 2024-03-15 | End: 2024-03-15

## 2024-03-15 RX ORDER — MEPERIDINE HYDROCHLORIDE 25 MG/ML
12.5 INJECTION INTRAMUSCULAR; INTRAVENOUS; SUBCUTANEOUS ONCE
Status: DISCONTINUED | OUTPATIENT
Start: 2024-03-15 | End: 2024-03-15 | Stop reason: HOSPADM

## 2024-03-15 RX ORDER — FAMOTIDINE 10 MG/ML
20 INJECTION INTRAVENOUS ONCE
Status: COMPLETED | OUTPATIENT
Start: 2024-03-15 | End: 2024-03-15

## 2024-03-15 RX ORDER — DEXAMETHASONE SODIUM PHOSPHATE 4 MG/ML
INJECTION, SOLUTION INTRA-ARTICULAR; INTRALESIONAL; INTRAMUSCULAR; INTRAVENOUS; SOFT TISSUE
Status: DISCONTINUED | OUTPATIENT
Start: 2024-03-15 | End: 2024-03-15

## 2024-03-15 RX ORDER — EPHEDRINE SULFATE 50 MG/ML
INJECTION, SOLUTION INTRAVENOUS
Status: DISCONTINUED | OUTPATIENT
Start: 2024-03-15 | End: 2024-03-15

## 2024-03-15 RX ORDER — METOCLOPRAMIDE HYDROCHLORIDE 5 MG/ML
10 INJECTION INTRAMUSCULAR; INTRAVENOUS ONCE
Status: COMPLETED | OUTPATIENT
Start: 2024-03-15 | End: 2024-03-15

## 2024-03-15 RX ORDER — SODIUM CHLORIDE 9 MG/ML
INJECTION, SOLUTION INTRAVENOUS CONTINUOUS
Status: DISCONTINUED | OUTPATIENT
Start: 2024-03-15 | End: 2024-03-15 | Stop reason: HOSPADM

## 2024-03-15 RX ORDER — CLINDAMYCIN PHOSPHATE 600 MG/50ML
600 INJECTION, SOLUTION INTRAVENOUS ONCE
Status: COMPLETED | OUTPATIENT
Start: 2024-03-15 | End: 2024-03-15

## 2024-03-15 RX ORDER — GENTAMICIN SULFATE 80 MG/50ML
80 INJECTION, SOLUTION INTRAVENOUS
Status: COMPLETED | OUTPATIENT
Start: 2024-03-15 | End: 2024-03-15

## 2024-03-15 RX ORDER — ACETAMINOPHEN 500 MG
1000 TABLET ORAL ONCE
Status: COMPLETED | OUTPATIENT
Start: 2024-03-15 | End: 2024-03-15

## 2024-03-15 RX ORDER — CEFAZOLIN SODIUM 1 G/3ML
INJECTION, POWDER, FOR SOLUTION INTRAMUSCULAR; INTRAVENOUS
Status: DISCONTINUED
Start: 2024-03-15 | End: 2024-03-15 | Stop reason: HOSPADM

## 2024-03-15 RX ORDER — PROCHLORPERAZINE EDISYLATE 5 MG/ML
5 INJECTION INTRAMUSCULAR; INTRAVENOUS EVERY 6 HOURS PRN
Status: CANCELLED | OUTPATIENT
Start: 2024-03-15

## 2024-03-15 RX ORDER — MUPIROCIN 20 MG/G
OINTMENT TOPICAL 2 TIMES DAILY
Status: CANCELLED | OUTPATIENT
Start: 2024-03-15 | End: 2024-03-17

## 2024-03-15 RX ORDER — SODIUM CHLORIDE, SODIUM LACTATE, POTASSIUM CHLORIDE, CALCIUM CHLORIDE 600; 310; 30; 20 MG/100ML; MG/100ML; MG/100ML; MG/100ML
INJECTION, SOLUTION INTRAVENOUS CONTINUOUS
Status: DISCONTINUED | OUTPATIENT
Start: 2024-03-15 | End: 2024-03-15 | Stop reason: HOSPADM

## 2024-03-15 RX ORDER — PHENYLEPHRINE HYDROCHLORIDE 10 MG/ML
INJECTION INTRAVENOUS
Status: DISCONTINUED | OUTPATIENT
Start: 2024-03-15 | End: 2024-03-15

## 2024-03-15 RX ORDER — ONDANSETRON HYDROCHLORIDE 2 MG/ML
4 INJECTION, SOLUTION INTRAVENOUS ONCE
Status: DISCONTINUED | OUTPATIENT
Start: 2024-03-15 | End: 2024-03-15 | Stop reason: HOSPADM

## 2024-03-15 RX ORDER — CYCLOBENZAPRINE HCL 10 MG
10 TABLET ORAL 3 TIMES DAILY PRN
Status: CANCELLED | OUTPATIENT
Start: 2024-03-15

## 2024-03-15 RX ORDER — GENTAMICIN 40 MG/ML
INJECTION, SOLUTION INTRAMUSCULAR; INTRAVENOUS
Status: DISCONTINUED | OUTPATIENT
Start: 2024-03-15 | End: 2024-03-15 | Stop reason: HOSPADM

## 2024-03-15 RX ORDER — SODIUM CHLORIDE 9 MG/ML
INJECTION, SOLUTION INTRAVENOUS CONTINUOUS
Status: CANCELLED | OUTPATIENT
Start: 2024-03-15

## 2024-03-15 RX ORDER — LIDOCAINE HYDROCHLORIDE 10 MG/ML
INJECTION, SOLUTION EPIDURAL; INFILTRATION; INTRACAUDAL; PERINEURAL
Status: DISCONTINUED | OUTPATIENT
Start: 2024-03-15 | End: 2024-03-15

## 2024-03-15 RX ORDER — HYDROMORPHONE HYDROCHLORIDE 2 MG/ML
0.5 INJECTION, SOLUTION INTRAMUSCULAR; INTRAVENOUS; SUBCUTANEOUS EVERY 5 MIN PRN
Status: DISCONTINUED | OUTPATIENT
Start: 2024-03-15 | End: 2024-03-15 | Stop reason: HOSPADM

## 2024-03-15 RX ORDER — PROPOFOL 10 MG/ML
VIAL (ML) INTRAVENOUS
Status: DISCONTINUED | OUTPATIENT
Start: 2024-03-15 | End: 2024-03-15

## 2024-03-15 RX ADMIN — FENTANYL CITRATE 100 MCG: 50 INJECTION, SOLUTION INTRAMUSCULAR; INTRAVENOUS at 07:03

## 2024-03-15 RX ADMIN — FENTANYL CITRATE 50 MCG: 50 INJECTION, SOLUTION INTRAMUSCULAR; INTRAVENOUS at 09:03

## 2024-03-15 RX ADMIN — PHENYLEPHRINE HYDROCHLORIDE 100 MCG: 10 INJECTION INTRAVENOUS at 10:03

## 2024-03-15 RX ADMIN — ACETAMINOPHEN 1000 MG: 500 TABLET ORAL at 06:03

## 2024-03-15 RX ADMIN — EPHEDRINE SULFATE 20 MG: 50 INJECTION INTRAVENOUS at 10:03

## 2024-03-15 RX ADMIN — PHENYLEPHRINE HYDROCHLORIDE 150 MCG: 10 INJECTION INTRAVENOUS at 10:03

## 2024-03-15 RX ADMIN — CLINDAMYCIN IN 5 PERCENT DEXTROSE 600 MG: 12 INJECTION, SOLUTION INTRAVENOUS at 07:03

## 2024-03-15 RX ADMIN — MIDAZOLAM HYDROCHLORIDE 2 MG: 1 INJECTION, SOLUTION INTRAMUSCULAR; INTRAVENOUS at 07:03

## 2024-03-15 RX ADMIN — PROPOFOL 150 MG: 10 INJECTION, EMULSION INTRAVENOUS at 07:03

## 2024-03-15 RX ADMIN — PROPOFOL 50 MG: 10 INJECTION, EMULSION INTRAVENOUS at 09:03

## 2024-03-15 RX ADMIN — DEXAMETHASONE SODIUM PHOSPHATE 4 MG: 4 INJECTION, SOLUTION INTRA-ARTICULAR; INTRALESIONAL; INTRAMUSCULAR; INTRAVENOUS; SOFT TISSUE at 08:03

## 2024-03-15 RX ADMIN — FAMOTIDINE 20 MG: 10 INJECTION, SOLUTION INTRAVENOUS at 06:03

## 2024-03-15 RX ADMIN — HYDROCODONE BITARTRATE AND ACETAMINOPHEN 1 TABLET: 5; 325 TABLET ORAL at 01:03

## 2024-03-15 RX ADMIN — GENTAMICIN SULFATE 80 MG: 80 INJECTION, SOLUTION INTRAVENOUS at 07:03

## 2024-03-15 RX ADMIN — ONDANSETRON 4 MG: 2 INJECTION INTRAMUSCULAR; INTRAVENOUS at 10:03

## 2024-03-15 RX ADMIN — ROCURONIUM BROMIDE 30 MG: 50 INJECTION INTRAVENOUS at 09:03

## 2024-03-15 RX ADMIN — SUGAMMADEX 200 MG: 100 INJECTION, SOLUTION INTRAVENOUS at 10:03

## 2024-03-15 RX ADMIN — SODIUM CHLORIDE, POTASSIUM CHLORIDE, SODIUM LACTATE AND CALCIUM CHLORIDE: 600; 310; 30; 20 INJECTION, SOLUTION INTRAVENOUS at 06:03

## 2024-03-15 RX ADMIN — INDOCYANINE GREEN AND WATER 7.5 MG: KIT at 10:03

## 2024-03-15 RX ADMIN — LIDOCAINE HYDROCHLORIDE 20 MG: 10 INJECTION, SOLUTION EPIDURAL; INFILTRATION; INTRACAUDAL; PERINEURAL at 07:03

## 2024-03-15 RX ADMIN — ROCURONIUM BROMIDE 50 MG: 50 INJECTION INTRAVENOUS at 07:03

## 2024-03-15 RX ADMIN — METOCLOPRAMIDE 10 MG: 5 INJECTION, SOLUTION INTRAMUSCULAR; INTRAVENOUS at 06:03

## 2024-03-15 NOTE — PROGRESS NOTES
Jeremy drain with compressed bulb intact noted, surrounding area soft and warm to the touch, color WDL.

## 2024-03-15 NOTE — OP NOTE
DATE OF PROCEDURE: 3/15/2024    SURGEON: Christina Romero M.D.    ASSISTANT:  Sayda Lozada PA-C    PREOPERATIVE DIAGNOSIS: Malignant neoplasm of upper-outer quadrant of right breast in female, estrogen receptor positive [C50.411, Z17.0]     POSTOPERATIVE DIAGNOSIS: Post-Op Diagnosis Codes:     * Malignant neoplasm of upper-outer quadrant of right breast in female, estrogen receptor positive [C50.411, Z17.0]     * Personal history of malignant neoplasm of breast [Z85.3]     * Encounter for breast reconstruction following mastectomy [Z42.1]     ANESTHESIA: General Anesthesiologist: Ashutosh Espinoza DO  CRNA: Kika Mejia CRNA     PROCEDURES PERFORMED:   1. Bilateral breast simple mastectomy  2. Identification of right axillary sentinel lymph node  3. Right axillary deep sentinel lymph node biopsy   4. Injection of right breast with Superparamagnetic iron oxide (MagTrace) for sentinel lymph node identification pre-operatively    PROCEDURE IN DETAIL:   Brenden Franklin Parisa is a 54 y.o. female brought to the operating room for definitive surgical management of right breast cancer. The patient has elected to undergo bilateral simple mastectomy with right sentinel lymph node biopsy for nessa assessment. The patient was informed of the possible risks and complications of the procedure, including but not limited to anesthetic risks, bleeding, infection, and need for additional surgery.  The patient concurred with the proposed plan, and has given informed consent.  The site of surgery was properly noted/marked in the preoperative holding area.    The patient's right breast was injected with Superparamagnetic iron oxide (MagTrace) in office to facilitate sentinel lymph node identification. The patient was then brought to the operating room and placed in the supine position with both upper extremities extended.  General anesthesia was administered. Perioperative antibiotics were administered and a time  out was performed confirming the patient, site, and procedure. The bilateral chest and axilla was then prepped and draped in the usual sterile fashion.    The Right Mastectomy  The right  skin-sparing  mastectomy was performed first. A large elliptical skin incision was made to right breast tissue that included the nipple-areolar complex. Flaps were raised in the avascular plane between subcutaneous tissue and breast tissue was used for to raise flaps from the clavicle superiorly, the sternum medially, the anterior rectus sheath inferiorly, and the lateral border of the pectoralis major muscle laterally. Hemostasis was maintained in the flaps. Next, the breast tissue and underlying pectoralis fascia were excised from the pectoralis major muscle, progressing from medial to lateral. At the lateral border of the pectoralis major muscle, the breast tissue was sung laterally and a lateral pedicle identified where breast tissue gave way to fat of axilla. The lateral pedicle was incised, removed and the specimen was marked. Superiorly was a short suture and laterally was a long suture. The resulting mastectomy specimen was marked using a short stitch superiorly and a long stitch laterally. The breast was sent to pathology for permanent evaluation. The operative field was irrigated with normal saline and all bleeding points were secured with Bovie electrocautery.     Right [place] Lymph Node Biopsy was then performed  Using the hand-held SentiMag probe, activity was noted and localized in the right axilla. A small transverse inferior axillary incision over the area of activity. Dissection was carried down through the clavipectoral fascia using electrocautery. The lateral border of the pectoralis of the right axilla was excised and the axilla was entered. Using a hand-held SentiMag probe the axilla was assessed for a sentinel lymph node. A Brown afferent lymphatic channel coming from the breast upper outer quadrant to level  1 axillary lymph tissue. [place] lymph nodes were identified as follows:  Right axillary sentinel lymph node #1, Palpable  Right axillary sentinel lymph node #2, Superparamagnetic iron oxide (MagTrace) identified and count: [phone]  Right axillary sentinel lymph node #3, Palpable      The substances used for sentinel lymph node biopsy in this patient: Superparamagnetic iron oxide (MagTrace)  Number of lymph nodes removed with MagTrace: 1  Number of lymph nodes removed with Isosulfan (or Methylene) blue dye and Radiotracer: 0  Number of lymph nodes removed with only Radiotracer signal: 0  Number of lymph nodes removed with only Isosulfan (or Methylene) blue dye: 0  Number of lymph nodes removed with only palpable: 2    The sentinel lymph nodes were not sent to Pathologist for intra-operative Frozen Section and permanent pathology review will be performed.    No further dissection was undertaken.    Drain placement and incision closer was completed by the plastic surgery service. s.    The Left Mastectomy  The left  skin-sparing  mastectomy was performed next. A large elliptical skin incision was made to right breast tissue that included the nipple-areolar complex. Flaps were raised in the avascular plane between subcutaneous tissue and breast tissue was used for to raise flaps from the clavicle superiorly, the sternum medially, the anterior rectus sheath inferiorly, and the lateral border of the pectoralis major muscle laterally. Hemostasis was maintained in the flaps. Next, the breast tissue and underlying pectoralis fascia were excised from the pectoralis major muscle, progressing from medial to lateral. At the lateral border of the pectoralis major muscle, the breast tissue was sung laterally and a lateral pedicle identified where breast tissue gave way to fat of axilla. The lateral pedicle was incised, removed and the specimen was marked. Superiorly was a short suture and laterally was a long suture. The resulting  mastectomy specimen was marked using a short stitch superiorly and a long stitch laterally. The breast was sent to pathology for permanent evaluation. The operative field was irrigated with normal saline and all bleeding points were secured with Bovie electrocautery.    Drain placement and incision closer was completed by the plastic surgery service.     Significant Surgical Tasks Conducted by the Assistant(s), if Applicable: The skilled assistance of the Physician Assistant, Sayda Lozada PA-C, was necessary for the successful completion of this case. She was essential for proper positioning of the patient, manipulation of instruments, proper exposure, manipulation of tissue, and wound closure.       ESTIMATED BLOOD LOSS: 20 ml    Implants:   Implant Name Type Inv. Item Serial No.  Lot No. LRB No. Used Action   TISSUE ALLODERM SELECT 1.2-2.0 - [id number]  TISSUE ALLODERM SELECT 1.2-2.0  [employer] [id number] Right 1 Implanted   EXPANDER NATRELLE FH EP 450CC - [id number]  EXPANDER NATRELLE FH EP 450CC [id number] [employer] [id number] Right 1 Implanted   TISSUE ALLODERM SELECT 1.2-2.0 - [id number]  TISSUE ALLODERM SELECT 1.2-2.0  [employer] [id number] Left 1 Implanted       Specimens:   Specimen (24h ago, onward)       Start     Ordered    03/15/24 0927  Specimen to Pathology  RELEASE UPON ORDERING        References:    Click here for ordering Quick Tip   Question:  Release to patient  Answer:  Immediate    03/15/24 0927                   ID Type Source Tests Collected by Time Destination   A : Right simple mastctomy; short stitch superior, long stitch lateral Tissue Breast, Right SPECIMEN TO PATHOLOGY Christina Romero MD 3/15/2024 0848    B : Right axillary sentinel lymph node # 1: palpable Tissue Lymph Node SPECIMEN TO PATHOLOGY Christina Romero MD 3/15/2024 0856    C : Right axillary sentinel lymph node #2: hot, brown, @9999 Tissue Lymph Node SPECIMEN TO PATHOLOGY Heather  Christina MARTINEZ MD 3/15/2024 0906    D : Right axillary sentinel lymph node #3: palpable Tissue Lymph Node SPECIMEN TO PATHOLOGY Christina Romero MD 3/15/2024 0906    E : Left simple mastectomy; short stitch superior, long stitch lateral Tissue Breast, Left SPECIMEN TO PATHOLOGY Christina Romero MD 3/15/2024 0927                 Condition: Good    Disposition: PACU - hemodynamically stable.    Attestation: I performed the procedure.    [place] Node Biopsy for Breast Cancer  Operation performed with curative intent Yes   Tracer(s) used to identify sentinel nodes in the upfront surgery (non-neoadjuvant) setting Superparamagnetic iron oxide   Tracer(s) used to identify sentinel nodes in the neoadjuvant setting N/A   All nodes (colored or non-colored) present at the end of a dye-filled lymphatic channel were removed Yes   All significantly radioactive nodes were removed Yes   All palpably suspicious nodes were removed Yes   Biopsy-proven positive nodes marked with clips prior to chemotherapy were identified and removed N/A

## 2024-03-15 NOTE — DISCHARGE INSTRUCTIONS
Breast Reconstruction Discharge Instructions   About this topic   Breast reconstruction is done to rebuild the shape of your breast. You may have had a mastectomy, which is the removal of a breast, to treat cancer or other disease. Breast reconstruction may be done during or after a mastectomy. The doctor uses an implant or a flap of muscles and tissues from your body to remake your breast.     What care is needed at home?   Sleep with your head and chest raised on 2 to 3 pillows to lower swelling.  Cough and take deep breaths to help keep your lungs clear.  Be sure to wash your hands before and after touching your wound or dressing. Do not remove your dressings.  You may take a shower after the MD sees you in the office and says it is okay to shower. Wash your incision with soap and water, pat dry, then leave open to air. You may cover the incision if it starts draining. Sponge bathe only until then.   Do not lift anything over 10 pounds (4.5 kg)  Ask your doctor when you may go back to your normal activities like work or exercising  Avoid using creams until your skin has healed to lower your risk of infection or told by your doctor.  Wear your supportive/surgical bra at all times except when showering. Put on another open front supportive bra when washing the old one.   Tops that button up the front are easier to put on and take off than those that slip over your head.  Your bowel movements may take some time to get back to normal. Eat small meals high in fiber to avoid hard stools. Drink 6 to 8 glasses of water each day.  Try to walk each day. Start by walking a little more than you did the day before. Walking boosts blood flow and helps prevent lung, belly, and blood problems.  What follow-up care is needed?   Your doctor may ask you to make visits to the office to check on your progress. Be sure to keep your visits.  Any drains placed in your breasts will be removed 2 to 4 days after surgery.  You may have  stitches or staples. If so, your doctor will often want to remove the stitches or staples in 1 to 2 weeks.  If you have a tissue expander, your doctor may need to fill the pouch with saline. Your doctor will set the schedule for follow-up.  If you had silicone gel implants, your doctor will check them every few years using ultrasound or MRI.  Your breasts implants may need to be replaced over time.  A second smaller surgery may need to be done. The surgery would work on fixing the size, shape, and color of your nipple.  Will physical activity be limited?   You will need to limit your activity for a while. Avoid lifting, sports, and sex until your doctor says physical activity is OK. Talk with your doctor about the right amount of activity for you.  What problems could happen?   Bleeding  Infection  Scarring or wrinkled skin over the implant  Implant may make cancer harder to find  Breast sensation may not be the same as your natural breast  Leakage of implants  Problem with breastfeeding  When do I need to call the doctor?   Signs of infection, such as a fever of 100.4°F (38°C) or higher, chills, wound that will not heal.  Signs of wound infection, such as swelling, redness, warmth around the wound; too much pain when touched; yellowish, greenish, or bloody discharge; foul smell coming from the cut site; cut site opens up.  Drain seems clogged and there is fluid under your surgery cuts  Cough, shortness of breath, chest pain  Upset stomach and throwing up that are not helped by nausea drugs  Pain that is not helped by pain drugs

## 2024-03-15 NOTE — ANESTHESIA POSTPROCEDURE EVALUATION
Anesthesia Post Evaluation    Patient: Brenden Franklin Parisa    Procedure(s) Performed: Procedure(s) (LRB):  MASTECTOMY, SIMPLE - BILATERAL (Bilateral)  BIOPSY, LYMPH NODE, SENTINEL - RIGHT MagTrace Injected in office SentiMag needed No NUC MED (Right)  INSERTION, TISSUE EXPANDER, BREAST (bilateral breast recon with placement of tissue expanders and acellular dermal matrix, use of intraoperative fluoroscein angiography) (Bilateral)    Final Anesthesia Type: general      Patient location during evaluation: PACU  Patient participation: Yes- Able to Participate  Level of consciousness: awake and alert  Post-procedure vital signs: reviewed and stable  Pain management: adequate  Airway patency: patent  PIETER mitigation strategies: Multimodal analgesia  PONV status at discharge: No PONV  Anesthetic complications: no      Cardiovascular status: hemodynamically stable  Respiratory status: unassisted  Hydration status: euvolemic  Follow-up not needed.              Vitals Value Taken Time   /77 03/15/24 1131   Temp 36.3 °C (97.3 °F) 03/15/24 1050   Pulse 88 03/15/24 1147   Resp 21 03/15/24 1132   SpO2 94 % 03/15/24 1133   Vitals shown include unvalidated device data.      Event Time   Out of Recovery 11:46:00         Pain/Zohaib Score: Pain Rating Prior to Med Admin: 0 (3/15/2024  6:56 AM)  Zohaib Score: 10 (3/15/2024 11:40 AM)

## 2024-03-15 NOTE — PROGRESS NOTES
Op site dressing noted, surrounding area soft and warm to the touch, color WDL.  Surgical bra noted over all dressings

## 2024-03-15 NOTE — DISCHARGE SUMMARY
[facility] Surgical - Periop Services  Discharge Note  Short Stay    Procedure(s) (LRB):  MASTECTOMY, SIMPLE - BILATERAL (Bilateral)  BIOPSY, LYMPH NODE, SENTINEL - RIGHT MagTrace Injected in office SentiMag needed No NUC MED (Right)  INSERTION, TISSUE EXPANDER, BREAST (bilateral breast recon with placement of tissue expanders and acellular dermal matrix, use of intraoperative fluoroscein angiography) (Bilateral)      OUTCOME: Patient tolerated treatment/procedure well without complication and is now ready for discharge.    DISPOSITION: Home or Self Care    FINAL DIAGNOSIS:  <principal problem not specified>    FOLLOWUP: In clinic    DISCHARGE INSTRUCTIONS:  No discharge procedures on file.     TIME SPENT ON DISCHARGE: 5 minutes

## 2024-03-15 NOTE — TRANSFER OF CARE
"Anesthesia Transfer of Care Note    Patient: Brenden Franklin Parisa    Procedure(s) Performed: Procedure(s) (LRB):  MASTECTOMY, SIMPLE - BILATERAL (Bilateral)  BIOPSY, LYMPH NODE, SENTINEL - RIGHT MagTrace Injected in office SentiMag needed No NUC MED (Right)  INSERTION, TISSUE EXPANDER, BREAST (bilateral breast recon with placement of tissue expanders and acellular dermal matrix, use of intraoperative fluoroscein angiography) (Bilateral)    Patient location: PACU    Anesthesia Type: general    Transport from OR: Transported from OR on room air with adequate spontaneous ventilation    Post pain: adequate analgesia    Post assessment: no apparent anesthetic complications    Post vital signs: stable    Level of consciousness: sedated    Nausea/Vomiting: no nausea/vomiting    Complications: none    Transfer of care protocol was followed      Last vitals: Visit Vitals  /83   Pulse 71   Temp 37.1 °C (98.7 °F) (Oral)   Resp 20   Ht 5' 4" (1.626 m)   Wt 84.7 kg (186 lb 11.7 oz)   SpO2 98%   Breastfeeding No   BMI 32.05 kg/m²     "

## 2024-03-15 NOTE — ANESTHESIA PREPROCEDURE EVALUATION
03/15/2024  Brenden Franklin Parisa is a 54 y.o., female presenting for bilateral mastectomy w/ insertion of tissue expanders.    Heart attack    Other Medical History   Hyperlipidemia Diabetes mellitus   Abnormal uterine bleeding Menopause   Pregnancy Anemia   Endometriosis of uterus Anxiety   Fibroid Depression   Obesity, unspecified Sleep apnea   Cancer      Surgical History    TOTAL KNEE ARTHROPLASTY gastric sleeve   TONSILLECTOMY WISDOM TOOTH EXTRACTION   APPENDECTOMY  SECTION   BREAST SURGERY HYSTERECTOMY   LUMBAR LAMINECTOMY LUMBAR LAMINECTOMY   ABDOMINAL SURGERY HYSTEROSCOPY   COSMETIC SURGERY CERVICAL BIOPSY  W/ LOOP ELECTRODE EXCISION   DILATION AND CURETTAGE OF UTERUS ENDOMETRIAL ABLATION   TUBAL LIGATION SPINE SURGERY   JOINT REPLACEMENT CERVIX LESION DESTRUCTION   Cardiac Cath (prompted by abnormal stress test) 2023:  Coronary angiogram findings:   1. Coronary dominance: Left      2. Left Main: Angiographically normal vessel that bifurcates into the LAD   and left circumflex vessel.   3. Left Anterior Descending: Proximally there is a focal 20% stenosis   before the takeoff of the first diagonal branch.  Remainder of the LAD   with mild luminal regularities.  The distal vessel reaches the apex.   4. Left Circumflex: Dominant, large ectatic vessel.  First obtuse marginal   branch is a large-caliber vessel with a proximal 30% stenosis.  Otherwise   mild diffuse disease.   5. Right: Nondominant and small vessel.  Mild luminal disease.     Hemodynamic Findings:   LVEDP: 18 mmHg     Aortic pullback gradient:  0 mm hg     Summary:   1.  Mild nonobstructive coronary disease.   2.  Normal left ventricular end-diastolic pressure.   Pre-op Assessment    I have reviewed the Patient Summary Reports.     I have reviewed the Nursing Notes. I have reviewed the NPO Status.   I have reviewed the  Medications.     Review of Systems  Anesthesia Hx:  No problems with previous Anesthesia                Social:  Non-Smoker       Cardiovascular:       Past MI               ECG has been reviewed.                          Pulmonary:        Sleep Apnea                Endocrine:  Diabetes, type 2         Obesity / BMI > 30  Psych:    depression                Physical Exam  General: Well nourished, Cooperative, Alert and Oriented    Airway:  Mallampati: II   Mouth Opening: Normal  TM Distance: Normal  Tongue: Normal  Neck ROM: Normal ROM    Dental:  Intact    Chest/Lungs:  Clear to auscultation, Normal Respiratory Rate    Heart:  Rate: Normal  Rhythm: Regular Rhythm  Sounds: Normal    Abdomen:  Normal, Soft, Nontender        Anesthesia Plan  Type of Anesthesia, risks & benefits discussed:    Anesthesia Type: Gen ETT  Intra-op Monitoring Plan: Standard ASA Monitors  Post Op Pain Control Plan: multimodal analgesia  Induction:  IV  Airway Plan: Direct  Informed Consent: Informed consent signed with the Patient and all parties understand the risks and agree with anesthesia plan.  All questions answered.   ASA Score: 3  Day of Surgery Review of History & Physical: H&P Update referred to the surgeon/provider.    Ready For Surgery From Anesthesia Perspective.     .

## 2024-03-15 NOTE — ANESTHESIA PROCEDURE NOTES
Intubation    Date/Time: 3/15/2024 7:35 AM    Performed by: Kika Mejia CRNA  Authorized by: Ashutosh Espinoza DO    Intubation:     Induction:  Intravenous    Intubated:  Postinduction    Mask Ventilation:  Easy mask    Attempts:  1    Attempted By:  CRNA    Method of Intubation:  Direct    Blade:  Kirby 3    Laryngeal View Grade: Grade I - full view of cords      Difficult Airway Encountered?: No      Complications:  None    Airway Device:  Oral endotracheal tube    Airway Device Size:  7.0    Style/Cuff Inflation:  Cuffed (inflated to minimal occlusive pressure)    Inflation Amount (mL):  4    Tube secured:  21    Secured at:  The lips    Placement Verified By:  Capnometry    Complicating Factors:  None    Findings Post-Intubation:  BS equal bilateral  Notes:      ETT cuff pressure [id number]

## 2024-03-15 NOTE — PROGRESS NOTES
Op site dressing noted, surrounding area soft and warm to the touch, color WDL.  Surgical bra noted over all dressings.

## 2024-03-16 VITALS
SYSTOLIC BLOOD PRESSURE: 141 MMHG | HEIGHT: 64 IN | OXYGEN SATURATION: 97 % | DIASTOLIC BLOOD PRESSURE: 81 MMHG | BODY MASS INDEX: 31.88 KG/M2 | RESPIRATION RATE: 20 BRPM | TEMPERATURE: 97 F | WEIGHT: 186.75 LBS | HEART RATE: 67 BPM

## 2024-03-16 NOTE — OP NOTE
[facility] [address] [place] [address]    PATIENT NAME:      GALDINO POST  YOB: 1970  CSN:               [id number]  MRN:               [id number]  ADMIT DATE:        03/15/2024 05:37:00  PHYSICIAN:         Ubaldo David MD                          OPERATIVE REPORT      DATE OF SURGERY:    03/15/2024 00:00:00    SURGEON:  Ubaldo David MD    ASSISTANT:  Amy Bravo NP, who was necessary for facilitation of procedure,   deep retraction, and assistance in closure.    PREOPERATIVE DIAGNOSIS:  Right breast cancer.  Bilateral mastectomy.    POSTOPERATIVE DIAGNOSIS:  Right breast cancer.  Bilateral mastectomy.    PROCEDURE:    1. Bilateral breast reconstruction with placement of tissue expanders and   acellular dermal matrix.  2. Use of intraoperative fluorescein angiography.    INDICATION FOR PROCEDURE:  This is a 54-year-old female.  She was referred to me   by Dr. Christina Romero for reconstructive options after bilateral mastectomy and   then she had a history of her previous Wise pattern reduction mammoplasty.    After the risks, benefits, potential complications of surgery, the patient   elected to proceed with first-stage reconstruction with placement of tissue   expanders.    DESCRIPTION OF PROCEDURE:  The patient was identified in the preoperative   holding area.  She was marked.  She was taken to the operating room and placed   in supine position.  General endotracheal anesthesia provided.  She was prepped   and draped in a sterile surgical fashion.  Time-out was taken and everyone in   agreement.  Please refer Dr. Christina Romero's dictation.  She performed   bilateral mastectomy and right sentinel lymph node biopsy.  She used a   deanna-areolar incision removing the nipple-areolar complex on both sides and then   presented to the operating room after she completed the  portion on the   patient's right breast.  The wound beds were noted to be very hemostatic.  The   skin flaps appeared to have good perfusion.  The elevated pectoralis major   muscle releasing its lower IMF and lower sternal insertion with electrocautery   to develop the area of pocket underneath.  We then obtained a piece of contour   perforated AlloDerm.  This was trimmed to the appropriate size and then secured   along the IMF in the lateral chest wall using 0-Vicryl sutures in interrupted   fashion.  Two 15 round Janes drains were placed in the wound bed, exited   inferolaterally, secured to skin with 2-0 silk suture.  Exparel was injected   throughout the field.  We then copiously irrigated the wound out with Betadine   and Nieto solution, re-prepped the patient, changed her gloves and placed   sterile blue towels around the incision.  We then copiously irrigated the tissue   expander with Betadine and Nieto solution, removed the air and then placed it   underneath the AlloDerm pectoralis major muscle and infiltrated 450 mL expander   that was infiltrated with 300 mL of saline using sterile filling system.  We   then approximated the AlloDerm to the pectoralis major muscle over the expander.    Hemostasis was ensured.  The wound was temporarily tacked closed.  We then   performed the exact same procedure on the patient's left breast where similarly   elevated the pectoralis major muscle.  I obtained an AlloDerm after soaking in   Betadine and Nieto solution, securing it with 0 Vicryl sutures and trimmed to   the appropriate size, placing 2 drains in a similar fashion, also injecting   Exparel.  We sterilely re-prepped the patient, changed our gloves, obtained   expanders soaked in Betadine and Nieto solution, removed the air and placed   underneath the AlloDerm and pectoralis major muscle, also infiltrated 300 mL   using sterile filling system, this was also 450 mL expander.  AlloDerm was   approximated to the  pectoralis major muscle using 0-Vicryl sutures.  Wounds were   tacked closed and intraoperative fluorescein angiography was used.  As noted   there was good skin perfusion to all segments and no additional skin needed to   be removed.  An 0-Vicryl sutures were used in the deep subcutaneous tissue and   dermis, 2-0 subcuticular Stratafix for both incisions.  Dermabond tape was used   as dressing.  There were no complications.  All counts correct.        ______________________________  MD PERFECTO Quiñones/AQS  DD:  03/16/2024  Time:  08:50AM  DT:  03/16/2024  Time:  10:05AM  Job #:  [id number]      OPERATIVE REPORT

## 2024-03-25 ENCOUNTER — TELEPHONE (OUTPATIENT)
Dept: SURGERY | Facility: CLINIC | Age: 54
End: 2024-03-25
Payer: OTHER GOVERNMENT

## 2024-03-25 NOTE — PROGRESS NOTES
[facility] Breast Surg  Breast Surgical Oncology  Follow-Up Patient Office Visit       Referring Provider: No ref. provider found  PCP: Jani Rutherford DO   Medical Oncologist: No care team member to display   Radiation Oncologist: No care team member to display   Other Care Providers:     Chief Complaint:   Chief Complaint   Patient presents with    Post-op Evaluation     Patient c/o of tightness in bilateral mastectomies, intermittent aching pain with movement, swelling in right axilla        Subjective:   Treatment History:  3/15/2024 - Bilateral Mastectomy with Right SLNB with TE Reconstruction      Interval History:  3/26/2024 - Brenden Mccauley presents for her post op appointment.      HPI:  Brenden Mccauley is a 53 y.o. female who presents on 2024 for evaluation of newly diagnosed right  breast cancer. She has a history of reduction mammoplasty in  as well as previous right breast biopsy.     A detailed patient history was obtained and reviewed. She currently denies any breast issues including rashes, redness, pain, swelling, nipple discharge, or new lumps/masses.    MG breast density: Category C (heterogeneously dense)     Imagin2023 BL SC MG at [facility]= which revealed on R mg, there is an irregular mass with associated calcifications at 9 o'clock posterior depth.  There are calcifications in the right breast lower inner quadrant, middle depth. There is an asymmetry in the medial right breast, anterior depth.     On L MG, there are calcifications noted at 3 o'clock, posterior depth, in the medial left breast, anterior to middle depth.  There is an asymmetry with possible associated architectural distortion in the medial left breast, middle depth. BIRADS-0; additional imaging needed    2. 1/3/2024 BL DG MG/ BL US BREAST COMPLETE at [facility]-        Right MG- at 10 o'clock middle/posterior depth, there is a persistent 1.4 cm irregular  mass with spiculated margins and associated architectural distortion. There are punctate and amorphous calcifications associated with this mass. There are heterogeneous, punctate, and amorphous calcifications in a diffuse distribution throughout the right breast.  The most concentrated region of calcifications is located in the central/superior medial right breast anterior to middle depths. The asymmetry in the medial breast, anterior depth, effaces into normal-appearing fibroglandular tissue.   There are diffuse post surgical changes of the right breast related to previous reduction mammoplasty.          Right US-  At 10 o'clock 4 cm FN, there is a 1.4 cm x 0.9 cm x 1.4 cm irregular hypoechoic mass with spiculated and indistinct margins, posterior shadowing, associated architectural distortion, and associated echogenic calcifications. Benign simple cysts seen at 11 o'clock and 1 o'clock subareolar. The right axilla demonstrates no significant right axillary adenopathy.        Left MG- there are heterogeneous, punctate, and amorphous calcifications in a diffuse distribution throughout the left breast. The most concentrated groupings of calcifications are located in the upper inner and upper outer quadrants of the left breast middle/posterior depth. The asymmetry with possible associated architectural distortion seen in the medial left breast effaced into normal-appearing fibroglandular tissue on spot compression tomosynthesis images.        Left US- there are two morphologically similar parallel oval avascular hypoechoic masses with circumscribed margins  measuring 0.9 cm x 0.2 cm x 0.5 cm; at 4 o'clock 5 cm FN and 1.0 cm x 0.3 cm x 0.8 cm; at 4 o'clock 3 cm from the nipple.  These masses are without significant change mammographically dating back at least to the 2021 exam, and are also likely without significant change dating back to the 2017 exam allowing for differences in technique and interval reduction  mammoplasty.  No suspicious sonographic abnormality is evident in the left breast.. Left axilla demonstrates no significant left axillary adenopathy. BIRADS-4 suspicious;biopsy recommended     3. 2/8/2024 BL Breast MRI -   MRI Breast w/wo Contrast, w/CAD, Bilateral 02/08/2024  IMPRESSION: KNOWN BIOPSY PROVEN MALIGNANCY  1) Right breast upper outer quadrant, lower outer quadrant, and central region posterior to middle depths irregular heterogeneously enhancing spiculated mass with regional clumped non mass enhancement extending away superiorly, inferiorly, and anteriorly measures 5.7 x 2.6 x 7.2 cm ([id number]) in total extent and correlates with biopsy-proven malignancy.  BI-RADS 6: Known biopsy-proven malignancy.    2) No abnormal enhancement associated with the recent benign stereotactic guided core needle biopsy sites for calcifications in both breasts (right breast 3:00 axis middle depth and left breast upper inner quadrant middle depth).  There are multiple hematomas in the right breast upper inner and lower inner quadrants in the anterior depth related to the 3:00 axis middle depth core needle biopsy.  BI-RADS 2: Benign.    3) No MRI evidence of malignancy in the left breast.      Pathology:  1/25/2024 Ultrasound-guided Core Needle Biopsy Right Breast Mass 10 o'clock 4 cm FN-        - Invasive mammary carcinoma with mixed ductal and lobular features, grade 2 (measuring 9 mm) with associated calcifications          ER 86%          WA 80%          HER2 aden 1+          Ki67 23%    2. 1/25/2024 Stereotactic guided Core Needle Biopsy Right Breast 3 o'clock Calcifications-       -Stromal fibrosis, cystic dilation of ducts, sclerosing adenosis and microcalcifications         -No evidence of malignancy    3. 1/25/2024 Stereotactic guided Core Needle Biopsy Left Breast UIQ Middle Depth Calcifications       - Stromal fibrosis, cystic dilation of ducts, apocrine metaplasia, sclerosing adenosis, fibroadenomatoid change,  microcalcifications, and mild ductal epithelial hyperplasia of the usual type        -No evidence of malignancy    4. 3/15/2024 Bilateral Mastectomy with Right Alexander Lymph Node Biopsy which revealed-   Right Breast- multifocal invasive carcinoma with mixed ductal and lobular features (measuring 26 mm, 3.0 mm); fibroadenoma; calcifications within invasive carcinoma within benign lobules; perineural invasion by carcinoma. All margins negative for invasive carcinoma.  Eleven sentinel lymph nodes all negative except 2 with isolated tumor cells.    Left Breast- benign breast tissue with fibrocystic changes; calcifications within benign lobules        OB/GYN History:  Age at Menarche Onset: 10  Menopausal Status: postmenopausal, LMP: No LMP recorded. Patient has had a hysterectomy.  Hysterectomy/Oophorectomy: hysterectomy without BSO, at age 40  Hormonal birth control (duration): Yes OCP (estrogen/progesterone).   Pregnancy History:   Age at first live birth: 18  Hormone Replacement Therapy: Yes, Oral Estrogen  Patient did not breast feed.  Patient denies nipple discharge.   Patient admits to to previous breast biopsy- Right Breast  Benign  Patient denies to a personal history of breast cancer.    Other Relevant History:  Prior thoracic RT: none  Genetic testing: No  Ashkenazi Druze descent: No    Family History:  Family History   Problem Relation Age of Onset    Diabetes type I Mother     Multiple myeloma Mother     Cancer Mother 55        Multiple myeloma    Diabetes Mother     Hypertension Mother     Kidney disease Mother     Alzheimer's disease Father             No Known Problems Sister     No Known Problems Brother     Pancreatic cancer Maternal Grandmother 70    Breast cancer Paternal Grandmother 55    Liver cancer Paternal Grandmother 55    Breast cancer Paternal Aunt 62        Past History:  Past Medical History:   Diagnosis Date    Abnormal uterine bleeding     Anemia     Anxiety      Cancer     right breast    Depression     Diabetes mellitus     Endometriosis of uterus     Fibroid     Heart attack     Hyperlipidemia     Menopause 2017    Obesity, unspecified     BMI 32.10    Pregnancy     Miscarriage    Sleep apnea     no cpap        Past Surgical History:   Procedure Laterality Date    ABDOMINAL SURGERY      APPENDECTOMY      BREAST SURGERY Bilateral     breast reduction    CERVICAL BIOPSY  W/ LOOP ELECTRODE EXCISION      CERVIX LESION DESTRUCTION       SECTION      COSMETIC SURGERY      Breast reduction    DILATION AND CURETTAGE OF UTERUS  1987    ENDOMETRIAL ABLATION      gastric sleeve      HYSTERECTOMY      HYSTEROSCOPY      INSERTION OF BREAST TISSUE EXPANDER Bilateral 3/15/2024    Procedure: INSERTION, TISSUE EXPANDER, BREAST (bilateral breast recon with placement of tissue expanders and acellular dermal matrix, use of intraoperative fluoroscein angiography);  Surgeon: Ubaldo David MD;  Location: [facility];  Service: Plastics;  Laterality: Bilateral;  300mL filled for each tissue expander    JOINT REPLACEMENT      bilateral knees    LUMBAR LAMINECTOMY N/A 2023    Procedure: LAMINECTOMY, SPINE, LUMBAR L4 SNS:SSEP/EMG;  Surgeon: Kamlesh Gomez MD;  Location: [facility] OR;  Service: Orthopedics;  Laterality: N/A;    LUMBAR LAMINECTOMY N/A 2023    Procedure: LAMINECTOMY, SPINE, LUMBAR L4;  Surgeon: Kamlesh Gomez MD;  Location: [address];  Service: Neurosurgery;  Laterality: N/A;    SENTINEL LYMPH NODE BIOPSY Right 3/15/2024    Procedure: BIOPSY, LYMPH NODE, SENTINEL - RIGHT MagTrace Injected in office SentiMag needed No NUC MED;  Surgeon: Christina Romero MD;  Location: [facility] OR;  Service: General;  Laterality: Right;  MagTrace Injected in office  SentiMag needed  No NUC MED    SIMPLE MASTECTOMY Bilateral 3/15/2024    Procedure: MASTECTOMY, SIMPLE - BILATERAL;  Surgeon: Christina Romero MD;  Location: [facility];  Service: General;   Laterality: Bilateral;    SPINE SURGERY      TONSILLECTOMY      TOTAL KNEE ARTHROPLASTY Bilateral     TUBAL LIGATION      WISDOM TOOTH EXTRACTION          Social History     Socioeconomic History    Marital status: Single   Tobacco Use    Smoking status: Former     Average packs/day: 0.5 packs/day for 5.2 years (2.5 ttl pk-yrs)     Types: Cigarettes     Start date: 2018     Quit date: 2018     Years since quittin.2    Smokeless tobacco: Never    Tobacco comments:     1 pk every 3 days    Substance and Sexual Activity    Alcohol use: Not Currently     Alcohol/week: 1.0 standard drink of alcohol     Types: 1 Glasses of wine per week    Drug use: Never    Sexual activity: Not Currently     Partners: Male     Birth control/protection: Abstinence        Body mass index is 31.58 kg/m².     Allergy/Medications:   Review of patient's allergies indicates:   Allergen Reactions    Latex, natural rubber Hives, Anxiety, Itching, Palpitations and Swelling     Other Reaction(s): Unknown    Tomato (solanum lycopersicum) Hives and Swelling          Current Outpatient Medications:     ALPRAZolam (XANAX) 0.25 MG tablet, Take by mouth., Disp: , Rfl:     aspirin (ECOTRIN) 81 MG EC tablet, Take 81 mg by mouth once daily., Disp: , Rfl:     azelastine (ASTELIN) 137 mcg (0.1 %) nasal spray, SMARTSIG:Both Nares, Disp: , Rfl:     blood sugar diagnostic (ONETOUCH ULTRA TEST) Strp, CHECK SUGARS DAILY, Disp: , Rfl:     blood sugar diagnostic (TRUE METRIX GLUCOSE TEST STRIP) Strp, CHECK SUGARS DAILY, Disp: , Rfl:     blood-glucose meter kit, Check sugars daily. For T2DM without insulin (E11.9), Disp: , Rfl:     busPIRone (BUSPAR) 30 MG Tab, Take 30 mg by mouth 2 (two) times a day., Disp: , Rfl:     carvediloL (COREG) 3.125 MG tablet, Take 3.125 mg by mouth 2 (two) times daily., Disp: , Rfl:     celecoxib (CELEBREX) 200 MG capsule, , Disp: , Rfl:     cyclobenzaprine (FLEXERIL) 5 MG tablet, Take 5 mg by mouth., Disp: , Rfl:      docusate sodium (COLACE) 100 MG capsule, Take 100 mg by mouth., Disp: , Rfl:     etodolac (LODINE) 200 MG Cap, Take 200 mg by mouth 2 (two) times daily., Disp: , Rfl:     FEROSUL 325 mg (65 mg iron) Tab tablet, Take by mouth every other day., Disp: , Rfl:     fexofenadine (ALLEGRA) 180 MG tablet, Take 1 tablet by mouth every morning., Disp: , Rfl:     lamoTRIgine (LAMICTAL) 100 MG tablet, Take 100 mg by mouth once daily., Disp: , Rfl:     meloxicam (MOBIC) 15 MG tablet, Take 15 mg by mouth once daily., Disp: , Rfl:     metFORMIN (GLUCOPHAGE) 500 MG tablet, Take 500 mg by mouth 2 (two) times daily with meals., Disp: , Rfl:     montelukast (SINGULAIR) 10 mg tablet, Take 10 mg by mouth once daily., Disp: , Rfl:     mupirocin (BACTROBAN) 2 % ointment, SMARTSI Application Topical 2-3 Times Daily, Disp: , Rfl:     ondansetron (ZOFRAN-ODT) 4 MG TbDL, Take by mouth., Disp: , Rfl:     oxyCODONE (ROXICODONE) 5 MG immediate release tablet, Take 1 tablet (5 mg total) by mouth every 4 (four) hours as needed for Pain., Disp: 20 tablet, Rfl: 0    oxyCODONE-acetaminophen (PERCOCET) 5-325 mg per tablet, Take by mouth., Disp: , Rfl:     pantoprazole (PROTONIX) 20 MG tablet, Take 20 mg by mouth once daily., Disp: , Rfl:     ramelteon (ROZEREM) 8 mg tablet, Take 8 mg by mouth once daily., Disp: , Rfl:     rosuvastatin (CRESTOR) 10 MG tablet, Take 10 mg by mouth once daily., Disp: , Rfl:     sertraline 200 mg Cap, Take 200 mg by mouth once daily., Disp: , Rfl:     sulfamethoxazole-trimethoprim 800-160mg (BACTRIM DS) 800-160 mg Tab, Take 1 tablet by mouth 2 (two) times daily., Disp: , Rfl:     traMADoL (ULTRAM) 50 mg tablet, Take 1 tablet (50 mg total) by mouth every 6 (six) hours., Disp: 60 tablet, Rfl: 0    traZODone (DESYREL) 100 MG tablet, Take 100 mg by mouth every evening., Disp: , Rfl:     vitamin D (VITAMIN D3) 1000 units Tab, Take 1,000 Units by mouth once daily., Disp: , Rfl:        Review of Systems:  Review of Systems  "  Constitutional:  Negative for chills, fever and weight loss.   HENT:  Negative for sore throat.    Eyes:  Negative for blurred vision and double vision.   Respiratory:  Negative for cough and shortness of breath.    Cardiovascular:  Negative for chest pain, palpitations and leg swelling.   Gastrointestinal:  Positive for constipation. Negative for abdominal pain, diarrhea, heartburn, nausea and vomiting.        Takes meds 1 / week   Genitourinary:  Negative for dysuria, flank pain, frequency, hematuria and urgency.   Musculoskeletal:  Negative for back pain, joint pain and myalgias.   Neurological:  Positive for headaches. Negative for dizziness.        Been having for years   Endo/Heme/Allergies:  Does not bruise/bleed easily.   Psychiatric/Behavioral:  Negative for depression. The patient is not nervous/anxious.            Objective:     Vitals:  Blood pressure 115/79, pulse (!) 115, temperature 97.8 °F (36.6 °C), temperature source Oral, resp. rate 20, height 5' 4" (1.626 m), weight 83.5 kg (184 lb), SpO2 96 %.      Physical Exam:  General: The patient is awake, alert and oriented times three. The patient is well nourished and in no acute distress.   Musculoskeletal: The patient has a normal range of motion of her bilateral upper extremities.   Breast:  Right: Examination of right mastectomy fails to reveal any dominant masses or areas of significant focal nodularity. The nipple areola complex is surgically absent. There is no skin dimpling with movement of the pectoralis. There are no significant skin changes overlying the mastectomy. Mastectomy incision is healing well. YASMEEN drains in place with serosanguineous output.  Left: Examination of left mastectomy fails to reveal any dominant masses or areas of significant focal nodularity. The nipple areola complex is surgically absent. There is no skin dimpling with movement of the pectoralis. There are no significant skin changes overlying the mastectomy. Mastectomy " incision is healing well. YASMEEN drains in place with serosanguineous output.  Integumentary: no rashes or skin lesions present  Neurologic: cranial nerves intact, no signs of peripheral neurological deficit, motor/sensory function intact'    Assessment and Plan:     Encounter Diagnoses   Name Primary?    Malignant neoplasm of upper-outer quadrant of right breast in female, estrogen receptor positive Yes          We discussed her pathology results in detail.    We discussed her follow-up and next steps in detail.        Plan:     Problem List Items Addressed This Visit          Oncology    Malignant neoplasm of upper-outer quadrant of right breast in female, estrogen receptor positive - Primary    Current Assessment & Plan     Medical Oncology referral - re: adjuvant therapy, send Oncotype Dx on 2.6 cm mass  Clinical follow-up in 3 months  Plastic surgery to manage drains                  All of questions were answered    Christina Romero MD  Breast Surgical Oncology     OFFICE VISIT CODING:  Post-Op Visit

## 2024-03-26 ENCOUNTER — OFFICE VISIT (OUTPATIENT)
Dept: SURGERY | Facility: CLINIC | Age: 54
End: 2024-03-26
Payer: OTHER GOVERNMENT

## 2024-03-26 VITALS
WEIGHT: 184 LBS | DIASTOLIC BLOOD PRESSURE: 79 MMHG | HEIGHT: 64 IN | BODY MASS INDEX: 31.41 KG/M2 | OXYGEN SATURATION: 96 % | SYSTOLIC BLOOD PRESSURE: 115 MMHG | TEMPERATURE: 98 F | HEART RATE: 115 BPM | RESPIRATION RATE: 20 BRPM

## 2024-03-26 DIAGNOSIS — C50.411 MALIGNANT NEOPLASM OF UPPER-OUTER QUADRANT OF RIGHT BREAST IN FEMALE, ESTROGEN RECEPTOR POSITIVE: Primary | ICD-10-CM

## 2024-03-26 DIAGNOSIS — Z17.0 MALIGNANT NEOPLASM OF UPPER-OUTER QUADRANT OF RIGHT BREAST IN FEMALE, ESTROGEN RECEPTOR POSITIVE: Primary | ICD-10-CM

## 2024-03-26 PROCEDURE — 99213 OFFICE O/P EST LOW 20 MIN: CPT | Mod: PBBFAC | Performed by: SURGERY

## 2024-03-26 PROCEDURE — 99024 POSTOP FOLLOW-UP VISIT: CPT | Mod: ,,, | Performed by: SURGERY

## 2024-03-26 PROCEDURE — 99999 PR PBB SHADOW E&M-EST. PATIENT-LVL III: CPT | Mod: PBBFAC,,, | Performed by: SURGERY

## 2024-03-26 RX ORDER — DOCUSATE SODIUM 100 MG/1
100 CAPSULE, LIQUID FILLED ORAL
COMMUNITY
Start: 2024-03-12

## 2024-03-26 NOTE — ASSESSMENT & PLAN NOTE
Medical Oncology referral - re: adjuvant therapy, send Oncotype Dx on 2.6 cm mass  Clinical follow-up in 3 months  Plastic surgery to manage drains

## 2024-03-28 ENCOUNTER — PATIENT MESSAGE (OUTPATIENT)
Dept: SURGERY | Facility: CLINIC | Age: 54
End: 2024-03-28
Payer: OTHER GOVERNMENT

## 2024-04-02 ENCOUNTER — TUMOR BOARD CONFERENCE (OUTPATIENT)
Dept: SURGERY | Facility: CLINIC | Age: 54
End: 2024-04-02
Payer: OTHER GOVERNMENT

## 2024-04-03 NOTE — PROGRESS NOTES
A multidisciplinary Tumor Board meeting was held to consensually decide upon treatment recommendations for this breast cancer patient. The members present at the meeting included physicians representing breast radiology, surgical oncology, medical oncology, and radiation oncology.    Date of meetin2024    Patient Name: Brenden Mccauley  : 1970  Age: 54 y.o.  Sex: female      The patient's clinical history, staging, radiology, pathology, labs/biomarkers, and past treatments were discussed in detail. The following recommendations were made regarding further treatment:    Oncology History   Malignant neoplasm of upper-outer quadrant of right breast in female, estrogen receptor positive   2024 Initial Diagnosis    Malignant neoplasm of upper-outer quadrant of right breast in female, estrogen receptor positive     2024 Cancer Staged    Staging form: Breast, AJCC 8th Edition  - Clinical stage from 2024: Stage IA (cT1c, cN0, cM0, G2, ER+, IN+, HER2-)     3/15/2024 Surgery    Bilateral Skin-Sparing Mastectomy, Right SLNB, and TE reconstruction (Dr. David)     3/26/2024 Cancer Staged    Staging form: Breast, AJCC 8th Edition  - Pathologic stage from 3/26/2024: Stage IA (pT2(2), pN0(i+)(sn), cM0, G2, ER+, IN+, HER2-)           Recommendations:  Surgery: No further surgical recommendations.    Radiation Oncology: XRT not recommended.     Medical Oncology: Oncotype needed for adjuvant chemotherapy determination. Endocrine therapy also recommended.     Notes/Comments: none     Mili Rolon PA-C

## 2024-04-08 DIAGNOSIS — C50.911 ER+ PR+ CARCINOMA OF BREAST, RIGHT: ICD-10-CM

## 2024-04-08 DIAGNOSIS — C50.911 BREAST CANCER, RIGHT BREAST: Primary | ICD-10-CM

## 2024-04-08 DIAGNOSIS — Z17.0 ER+ PR+ CARCINOMA OF BREAST, RIGHT: ICD-10-CM

## 2024-04-09 NOTE — PROGRESS NOTES
Referring physician: Dr. Christina Romero  Reason for referral: Breast Cancer      Subjective:       Patient ID: Brenden Mccauley is a 54 y.o. female.    Right Breast Cancer Stage IA (T2(m2) N0(I+)M0)--Diagnosed 24  Biopsy/pathology: Right and left breast biopsies done 24--10:00 4CMFN mass with invasive mammary carcinoma with mixed ductal and lobular features, Grade 2, at least 9mm, associated calcifications, ER 86%, TN 80%, Her2 1+ negative/low, Ki67 23%, 3:00 middle depth calcifications with stromal fibrosis, sclerosing adenosis; Left breast UIQ middle depth calcifications with stromal fibrosis, apocrine metaplasia, sclerosing adenosis, fibroadenomatoid change.  Surgery/pathology:  Bilateral mastectomies done 3/15/24--Right breast with multifocal invasive carcinoma with mixed ductal and lobular features 26mm and 3mm, +PNI, 2/11 lymph nodes with isolated tumor cells; left breast mastectomy with benign breast tissue, fibrocystic changes, site of prior biopsy.   Imagin. Screening MMG done 23--on right irregular mass with associated calcifications at 9 o'clock posterior depth.  There are calcifications in the right breast lower inner quadrant, middle depth. There is an asymmetry in the medial right breast, anterior depth; on left calcifications noted at 3 o'clock, posterior depth, in the medial left breast, anterior to middle depth.  There is an asymmetry with possible associated architectural distortion in the medial left breast, middle depth. BIRADS-0; additional imaging needed.     Bilateral diagnostic MMG/US done 1/3/24--right MMG persistent 1.4cm irregular mass with spiculated margins and associated architectural distortion at 10:00, diffuse calcifications throughout right breast, on US 1.4cm irregular hypoechoic mass, right axilla with no adenopathy; left MMG with diffuse calcifications left breast most concentrated in UI and UO quadrants, assymetry in left breast effaced out,  left US with two morphologically similar parallel oval avascular hypoechoic masses with circumscribed margins  measuring 0.9 cm x 0.2 cm x 0.5 cm; at 4 o'clock 5 cm FN and 1.0 cm x 0.3 cm x 0.8 cm; at 4 o'clock 3 cm from the nipple, masses are without significant change mammographically dating back at least to the 2021 exam, and are also likely without significant change dating back to the 2017 exam allowing for differences in technique and interval reduction mammoplast. No suspicious sonographic abnormality is evident in the left breast.. Left axilla demonstrates no significant left axillary adenopathy. BIRADS-4 suspicious; biopsy recommended.  Breast MRI 2/8/24-- Right breast upper outer quadrant, lower outer quadrant, and central region posterior to middle depths irregular heterogeneously enhancing spiculated mass with regional clumped non mass enhancement extending away superiorly, inferiorly, and anteriorly measures 5.7 x 2.6 x 7.2 cm ([id number]) in total extent and correlates with biopsy-proven malignancy.  BI-RADS 6: Known biopsy-proven malignancy.  No abnormal enhancement associated with the recent benign stereotactic guided core needle biopsy sites for calcifications in both breasts (right breast 3:00 axis middle depth and left breast upper inner quadrant middle depth).  There are multiple hematomas in the right breast upper inner and lower inner quadrants in the anterior depth related to the 3:00 axis middle depth core needle biopsy.  BI-RADS 2: Benign. No MRI evidence of malignancy in the left breast.    [employer] BRCA1 and BRCA2 panel, and 84 individual genes with +heterozygous for BLM mutation, pathogenic variant (predisposition to colorectal cancer), POLE heterozygous mutation VUS.     Treatment history:  Bilateral mastectomies with right SLN biopsies with tissue expander reconstruction done 3/15/24.     Current treatment plan:  Oncotype DX testing  Adjuvant AI    Chief Complaint: Other Misc  (NPO)    HPI  55 yo female underwent screening MMG in 2023 and showed suspicious mass in right breast at 9:00 and additional area of architectural distortion left breast as well as calcifications in both breasts. Diagnostic MMG/US bilateral confirmed suspicious mass right breast measuring 1.4cm with associated diffuse calcifications, and left breast did show 2 masses likely benign, that had been there dated back to , biopsies recommended of right breast mass and calcifications in both breasts. Patient underwent biopsies right breast mass showed invasive mammary carcinoma mixed ductal and lobular, Grade 2 measuring up to 9mm, ER and NV strongly positive and Her2 negative, other biopsies of calcifications are benign. Patient was referred to Dr. Romero. Breast MRI did not show any other disease, just the right breast mass, with non-mass enhancement measuring up to 7.2cm. Patient made decision to undergo bilateral mastectomies and pathology showed multifocal invasive carcinoma, mixed, two tumors 26mm and 3mm, with +PNI, clear margins and 2/11 lymph nodes with isolated tumor cells. Patient reports menarche age 10, s/p JENNIFER fibroids and endometriosis, w/o BSO age 40, , +OCPs for 3 years, age at first live birth 18, +HRT oral estrogen for hot flashes, menopause age 44, previous breast biopsy in 2017, previous breast reduction. FH mother with MM age 55, MGM pancreatic cancer, PGM breast cancer, liver cancer, paternal aunt with breast cancer. Patient presents for initial clinic visit. She is doing well since surgery, only has some soreness. She is following with Dr. David with plans for KENNETH flap reconstruction. Patient also gives a long-standing history of MARC. She had heavy menses before her hysterectomy. She is on oral iron, but says she does not tend to respond very well to it. She has had IV iron in the past. She had genetic testing and +BLM heterozygous mutation, which puts her at risk for colon cancer. She  did have colonoscopy several years ago when she was living in [place] and had a cologuard last year that was good. She has an appointment with meet with Dr. Donis about the results next week. She is a retired  and she was also in the army.  I discussed plans for Oncotype and that results are pending.     Past Medical History:   Diagnosis Date    Abnormal uterine bleeding     Anemia     Anxiety     Cancer     right breast    Depression     Diabetes mellitus     Endometriosis of uterus     Fibroid     Heart attack     Hyperlipidemia     Menopause 2017    Obesity, unspecified     BMI 32.10    Pregnancy     Miscarriage    Sleep apnea     no cpap      Past Surgical History:   Procedure Laterality Date    ABDOMINAL SURGERY      APPENDECTOMY      BREAST SURGERY Bilateral     breast reduction    CERVICAL BIOPSY  W/ LOOP ELECTRODE EXCISION      CERVIX LESION DESTRUCTION       SECTION      COSMETIC SURGERY      Breast reduction    DILATION AND CURETTAGE OF UTERUS      ENDOMETRIAL ABLATION      gastric sleeve      HYSTERECTOMY      HYSTEROSCOPY      INSERTION OF BREAST TISSUE EXPANDER Bilateral 3/15/2024    Procedure: INSERTION, TISSUE EXPANDER, BREAST (bilateral breast recon with placement of tissue expanders and acellular dermal matrix, use of intraoperative fluoroscein angiography);  Surgeon: Ubaldo David MD;  Location: [facility] OR;  Service: Plastics;  Laterality: Bilateral;  300mL filled for each tissue expander    JOINT REPLACEMENT      bilateral knees    LUMBAR LAMINECTOMY N/A 2023    Procedure: LAMINECTOMY, SPINE, LUMBAR L4 SNS:SSEP/EMG;  Surgeon: Kamlesh Gomez MD;  Location: [facility] OR;  Service: Orthopedics;  Laterality: N/A;    LUMBAR LAMINECTOMY N/A 2023    Procedure: LAMINECTOMY, SPINE, LUMBAR L4;  Surgeon: Kamlesh Gomez MD;  Location: [address];  Service: Neurosurgery;  Laterality: N/A;    SENTINEL LYMPH NODE BIOPSY Right  3/15/2024    Procedure: BIOPSY, LYMPH NODE, SENTINEL - RIGHT MagTrace Injected in office SentiMag needed No NUC MED;  Surgeon: Christina Romero MD;  Location: [facility] OR;  Service: General;  Laterality: Right;  MagTrace Injected in office  SentiMag needed  No NUC MED    SIMPLE MASTECTOMY Bilateral 3/15/2024    Procedure: MASTECTOMY, SIMPLE - BILATERAL;  Surgeon: Christina Romero MD;  Location: [facility] OR;  Service: General;  Laterality: Bilateral;    SPINE SURGERY      TONSILLECTOMY      TOTAL KNEE ARTHROPLASTY Bilateral     TUBAL LIGATION      WISDOM TOOTH EXTRACTION       Family History   Problem Relation Age of Onset    Diabetes type I Mother     Multiple myeloma Mother     Cancer Mother 55        Multiple myeloma    Diabetes Mother     Hypertension Mother     Kidney disease Mother     Alzheimer's disease Father             No Known Problems Sister     No Known Problems Brother     Pancreatic cancer Maternal Grandmother 70    Breast cancer Paternal Grandmother 55    Liver cancer Paternal Grandmother 55    Breast cancer Paternal Aunt 62     Social History     Socioeconomic History    Marital status: Single   Tobacco Use    Smoking status: Former     Average packs/day: 0.5 packs/day for 5.3 years (2.5 ttl pk-yrs)     Types: Cigarettes     Start date: 2018     Quit date: 2018     Years since quittin.2    Smokeless tobacco: Never    Tobacco comments:     1 pk every 3 days    Substance and Sexual Activity    Alcohol use: Not Currently     Alcohol/week: 1.0 standard drink of alcohol     Types: 1 Glasses of wine per week    Drug use: Never    Sexual activity: Not Currently     Partners: Male     Birth control/protection: Abstinence       Review of patient's allergies indicates:   Allergen Reactions    Latex, natural rubber Hives, Anxiety, Itching, Palpitations and Swelling     Other Reaction(s): Unknown    Tomato (solanum lycopersicum) Hives and Swelling      Current Outpatient Medications  on File Prior to Visit   Medication Sig Dispense Refill    ALPRAZolam (XANAX) 0.25 MG tablet Take by mouth.      aspirin (ECOTRIN) 81 MG EC tablet Take 81 mg by mouth once daily.      azelastine (ASTELIN) 137 mcg (0.1 %) nasal spray SMARTSIG:Both Nares      blood sugar diagnostic (ONETOUCH ULTRA TEST) Strp CHECK SUGARS DAILY      blood sugar diagnostic (TRUE METRIX GLUCOSE TEST STRIP) Strp CHECK SUGARS DAILY      blood-glucose meter kit Check sugars daily. For T2DM without insulin (E11.9)      busPIRone (BUSPAR) 30 MG Tab Take 30 mg by mouth 2 (two) times a day.      carvediloL (COREG) 3.125 MG tablet Take 3.125 mg by mouth 2 (two) times daily.      celecoxib (CELEBREX) 200 MG capsule       docusate sodium (COLACE) 100 MG capsule Take 100 mg by mouth.      etodolac (LODINE) 200 MG Cap Take 200 mg by mouth 2 (two) times daily.      FEROSUL 325 mg (65 mg iron) Tab tablet Take by mouth every other day.      fexofenadine (ALLEGRA) 180 MG tablet Take 1 tablet by mouth every morning.      lamoTRIgine (LAMICTAL) 100 MG tablet Take 100 mg by mouth once daily.      meloxicam (MOBIC) 15 MG tablet Take 15 mg by mouth once daily.      metFORMIN (GLUCOPHAGE) 500 MG tablet Take 500 mg by mouth 2 (two) times daily with meals.      montelukast (SINGULAIR) 10 mg tablet Take 10 mg by mouth once daily.      mupirocin (BACTROBAN) 2 % ointment SMARTSI Application Topical 2-3 Times Daily      ondansetron (ZOFRAN-ODT) 4 MG TbDL Take by mouth.      oxyCODONE (ROXICODONE) 5 MG immediate release tablet Take 1 tablet (5 mg total) by mouth every 4 (four) hours as needed for Pain. 20 tablet 0    oxyCODONE-acetaminophen (PERCOCET) 5-325 mg per tablet Take by mouth.      pantoprazole (PROTONIX) 20 MG tablet Take 20 mg by mouth once daily.      ramelteon (ROZEREM) 8 mg tablet Take 8 mg by mouth once daily.      rosuvastatin (CRESTOR) 10 MG tablet Take 10 mg by mouth once daily.      sertraline 200 mg Cap Take 200 mg by mouth once daily.       "vitamin D (VITAMIN D3) 1000 units Tab Take 1,000 Units by mouth once daily.      cyclobenzaprine (FLEXERIL) 5 MG tablet Take 5 mg by mouth. (Patient not taking: Reported on 4/11/2024)      sulfamethoxazole-trimethoprim 800-160mg (BACTRIM DS) 800-160 mg Tab Take 1 tablet by mouth 2 (two) times daily. (Patient not taking: Reported on 4/11/2024)      traMADoL (ULTRAM) 50 mg tablet Take 1 tablet (50 mg total) by mouth every 6 (six) hours. (Patient not taking: Reported on 4/11/2024) 60 tablet 0    traZODone (DESYREL) 100 MG tablet Take 100 mg by mouth every evening. (Patient not taking: Reported on 4/11/2024)       No current facility-administered medications on file prior to visit.      Review of Systems   Constitutional:  Negative for appetite change and unexpected weight change.   HENT:  Negative for mouth sores.    Eyes:  Negative for visual disturbance.   Respiratory:  Negative for cough and shortness of breath.    Cardiovascular:  Negative for chest pain.   Gastrointestinal:  Negative for abdominal pain and diarrhea.   Genitourinary:  Negative for frequency.   Musculoskeletal:  Negative for back pain.   Integumentary:  Negative for rash.        S/p bilateral mastectomies with some soreness   Neurological:  Negative for headaches.   Hematological:  Negative for adenopathy.   Psychiatric/Behavioral:  The patient is nervous/anxious.               Vitals:    04/11/24 1357   BP: 125/73   Pulse: 92   Resp: 14   Temp: 98.4 °F (36.9 °C)   TempSrc: Oral   SpO2: 97%   Weight: 85.5 kg (188 lb 9.6 oz)   Height: 5' 4" (1.626 m)      Physical Exam  Constitutional:       Appearance: Normal appearance.   HENT:      Head: Normocephalic.      Nose: Nose normal.      Mouth/Throat:      Mouth: Mucous membranes are moist.   Eyes:      Extraocular Movements: Extraocular movements intact.      Conjunctiva/sclera: Conjunctivae normal.   Cardiovascular:      Rate and Rhythm: Normal rate and regular rhythm.   Pulmonary:      Effort: " Pulmonary effort is normal.      Breath sounds: Normal breath sounds.   Chest:      Comments: S/p bilateral mastectomies with tissue expanders in place, incisions healing  Abdominal:      General: Bowel sounds are normal. There is no distension.      Palpations: Abdomen is soft.      Tenderness: There is no abdominal tenderness.   Musculoskeletal:         General: Normal range of motion.   Skin:     General: Skin is warm.   Neurological:      General: No focal deficit present.      Mental Status: She is alert and oriented to person, place, and time.   Psychiatric:         Mood and Affect: Mood normal.         Judgment: Judgment normal.         Lab Results   Component Value Date    WBC 7.69 03/12/2024    HGB 10.6 (L) 03/12/2024    HCT 34.2 (L) 03/12/2024    MCV 82.4 03/12/2024     (H) 03/12/2024            Assessment:       1. Iron deficiency anemia due to chronic blood loss    2. Malignant neoplasm of upper-outer quadrant of right breast in female, estrogen receptor positive    3. Breast cancer, right breast    4. Er+ AZ+ carcinoma of breast, right    5. Monoallelic mutation of BLM gene         Plan:       Patient with right breast cancer s/p bilateral mastectomies done 3/15/24. Multifocal breast cancer, mixed ductal and lobular, 26mm and 3mm, with +PNI, and 2/11 lymph nodes with ITC, ER and AZ strongly positive and Her2 negative with Ki67 23%.  Per NCCN, recommended to have Oncotype, which has been sent, results pending.   No radiation recommended due to only isolated tumor cells in LN.  Patient is post-menopausal, JENNIFER age 40 for fibroids and endometriosis, and was on +HRT when she was diagnosed as post-menopausal since age 44. HRT stopped at diagnosis.     If Oncotype low risk, will proceed with treatment with AI.  If Oncotype high risk, plan for Taxotere/Cytoxan X 4 cycles followed by AI.     I discussed most common side effects of aromatase inhibitor treatment including, but not limited to hot flashes,  vaginal dryness, arthralgias and bone thinning.  Patient given information from [employer] on [employer].     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 She will meet with Dr. Donis soon, but will need referral to GI for colonoscopy.     She has known MARC, that is still ongoing per recent labs. She is on oral iron, but not sure if she is responding. She has required IV iron in the past.  Unclear etiology of MARC, this was likely from heavy menses in the past but she had hysterectomy age 40, and should not be ongoing.   Will check labs and iron studies on RTC. May need IV iron.    I have reviewed records from patient's cancer diagnosis including biopsy/operative reports, pathology reports, imaging studies, and pathology special testing.  Total time spent reviewing records, current NCCN guidelines, as well as face-to-face interaction with patient and family was >60 minutes.       Jailene Jose MD

## 2024-04-11 ENCOUNTER — OFFICE VISIT (OUTPATIENT)
Dept: HEMATOLOGY/ONCOLOGY | Facility: CLINIC | Age: 54
End: 2024-04-11
Payer: OTHER GOVERNMENT

## 2024-04-11 VITALS
HEIGHT: 64 IN | BODY MASS INDEX: 32.2 KG/M2 | SYSTOLIC BLOOD PRESSURE: 125 MMHG | DIASTOLIC BLOOD PRESSURE: 73 MMHG | RESPIRATION RATE: 14 BRPM | TEMPERATURE: 98 F | OXYGEN SATURATION: 97 % | HEART RATE: 92 BPM | WEIGHT: 188.63 LBS

## 2024-04-11 DIAGNOSIS — C50.411 MALIGNANT NEOPLASM OF UPPER-OUTER QUADRANT OF RIGHT BREAST IN FEMALE, ESTROGEN RECEPTOR POSITIVE: ICD-10-CM

## 2024-04-11 DIAGNOSIS — C50.911 ER+ PR+ CARCINOMA OF BREAST, RIGHT: ICD-10-CM

## 2024-04-11 DIAGNOSIS — Z15.89 MONOALLELIC MUTATION OF BLM GENE: ICD-10-CM

## 2024-04-11 DIAGNOSIS — D50.0 IRON DEFICIENCY ANEMIA DUE TO CHRONIC BLOOD LOSS: Primary | ICD-10-CM

## 2024-04-11 DIAGNOSIS — Z17.0 ER+ PR+ CARCINOMA OF BREAST, RIGHT: ICD-10-CM

## 2024-04-11 DIAGNOSIS — C50.911 BREAST CANCER, RIGHT BREAST: ICD-10-CM

## 2024-04-11 DIAGNOSIS — Z17.0 MALIGNANT NEOPLASM OF UPPER-OUTER QUADRANT OF RIGHT BREAST IN FEMALE, ESTROGEN RECEPTOR POSITIVE: ICD-10-CM

## 2024-04-11 PROBLEM — D50.9 IRON DEFICIENCY ANEMIA: Status: ACTIVE | Noted: 2024-04-11

## 2024-04-11 PROCEDURE — 99999 PR PBB SHADOW E&M-EST. PATIENT-LVL V: CPT | Mod: PBBFAC,,, | Performed by: INTERNAL MEDICINE

## 2024-04-11 PROCEDURE — 99205 OFFICE O/P NEW HI 60 MIN: CPT | Mod: S$PBB,,, | Performed by: INTERNAL MEDICINE

## 2024-04-11 PROCEDURE — 99215 OFFICE O/P EST HI 40 MIN: CPT | Mod: PBBFAC | Performed by: INTERNAL MEDICINE

## 2024-04-16 ENCOUNTER — OFFICE VISIT (OUTPATIENT)
Dept: HEMATOLOGY/ONCOLOGY | Facility: CLINIC | Age: 54
End: 2024-04-16
Payer: OTHER GOVERNMENT

## 2024-04-16 DIAGNOSIS — C50.411 MALIGNANT NEOPLASM OF UPPER-OUTER QUADRANT OF RIGHT BREAST IN FEMALE, ESTROGEN RECEPTOR POSITIVE: ICD-10-CM

## 2024-04-16 DIAGNOSIS — Z17.0 MALIGNANT NEOPLASM OF UPPER-OUTER QUADRANT OF RIGHT BREAST IN FEMALE, ESTROGEN RECEPTOR POSITIVE: ICD-10-CM

## 2024-04-16 PROCEDURE — 99215 OFFICE O/P EST HI 40 MIN: CPT | Mod: 95,,, | Performed by: NURSE PRACTITIONER

## 2024-04-16 NOTE — PROGRESS NOTES
REFERRING PHYSICIAN: Dr. Jailene Jose    Subjective:       Patient ID: Brenden Mccauley is a 54 y.o. female.    Chief Complaint: Personal recent diagnosis of breast cancer (right UOQ ER/OK+ Her2-) and a family history of cancer. Patient had genetic testing through her surgeon, Dr. Christina Romero, using [employer], with findings of a heterozygous pathogenic mutation BLM c.2923del (p.[id number]*24) and the variant of uncertain significance (VUS) POLE  c.5378+5C>T ([employer]onic). She presented via Telemedicine Visit (audio and video) today for discussion of implications of these results.     HPI  Past Medical History:   Diagnosis Date    Abnormal uterine bleeding 2006    Anemia     Anxiety     Cancer     right breast    Depression 1992    Diabetes mellitus     Endometriosis of uterus     Fibroid     Heart attack     Hyperlipidemia     Menopause 12/2017    Obesity, unspecified     BMI 32.10    Pregnancy 1987    Miscarriage    Sleep apnea     no cpap      Review of patient's allergies indicates:   Allergen Reactions    Latex, natural rubber Hives, Anxiety, Itching, Palpitations and Swelling     Other Reaction(s): Unknown    Tomato (solanum lycopersicum) Hives and Swelling      Review of Systems   Constitutional:  Negative for appetite change and unexpected weight change.   HENT:  Negative for mouth sores.    Eyes:  Negative for visual disturbance.   Respiratory:  Negative for cough and shortness of breath.    Cardiovascular:  Negative for chest pain.   Gastrointestinal:  Negative for abdominal pain and diarrhea.   Genitourinary:  Negative for frequency.   Musculoskeletal:  Positive for back pain.   Integumentary:  Negative for rash.   Neurological:  Negative for headaches.   Hematological:  Negative for adenopathy.   Psychiatric/Behavioral:  The patient is nervous/anxious.            Problem List Items Addressed This Visit          Oncology    Malignant neoplasm of upper-outer quadrant of right breast in female,  estrogen receptor positive     Oncology History   Malignant neoplasm of upper-outer quadrant of right breast in female, estrogen receptor positive   2024 Initial Diagnosis    Malignant neoplasm of upper-outer quadrant of right breast in female, estrogen receptor positive       2024 Cancer Staged    Staging form: Breast, AJCC 8th Edition  - Clinical stage from 2024: Stage IA (cT1c, cN0, cM0, G2, ER+, NV+, HER2-)       3/15/2024 Surgery    Bilateral Skin-Sparing Mastectomy, Right SLNB, and TE reconstruction (Dr. David)     3/26/2024 Cancer Staged    Staging form: Breast, AJCC 8th Edition  - Pathologic stage from 3/26/2024: Stage IA (pT2(2), pN0(i+)(sn), cM0, G2, ER+, NV+, HER2-)              Family History   Problem Relation Name Age of Onset    Diabetes type I Mother Paul Hansel     Multiple myeloma Mother Paul Hansel 55    Diabetes Mother Paul Hansel     Hypertension Mother Paul Hamm     Kidney disease Mother Paul Hamm     Alzheimer's disease Father              No Known Problems Sister      No Known Problems Brother      Pancreatic cancer Maternal Grandmother  70    Breast cancer Paternal Grandmother Claudette Hansel 55    Liver cancer Paternal Grandmother Claudette Hansel 55    Breast cancer Paternal Aunt Lesley Fulton 62       Assessment:   Genetic testing was appropriate for this patient because of personal and family history. This comprehensive panel indicated the heterozygous pathogenic mutation BLM c.2923del (p.[id number]*24). Two (2) BLM mutations (monozygous) are associated with Bloom Syndrome. 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 Bloom syndrome is characterized by proportionate small stature, microcephaly, abnormal skin pigmentation, facial anomalies, sun-sensitive facial erythema, infertility, immunodeficiency, and predisposition to a wide variety of cancers. Carriers of a single copy of this  mutation (monoallelic) are at increased colon cancer lifetime risk (5% to 10%) compared to the general population risk of 3.4%.       Currently there are no guidelines for the medical management of carriers of a single BLM mutation. Therefore, screening should be based on on family history.  It would be reasonable to begin colonoscopy at 40y, with repeat at least every 5 years.  There are data to suggest that aspirin may decrease the risk of colon cancer, however, at this time the data are not sufficiently robust to make a recommendation for its standard use.     First degree relatives of individuals with an BLM mutation have a 50% risk of having this same mutation. Parents who both BLM mutation carriers have a 25% risk of having a child with Bloom Syndrome. Family members can be tested with specific site analysis. A copy of a letter to assist Ms. Parisa with informing family members will emailed to her: [email or web address]    In addition to the BLM pathogenic mutation, there was a variant of uncertain significance (VUS) POLE  c.5378+5C>T (Intronic). The POLE gene is associated with autosomal dominant PPAP (polymerase proofreading-associated polyposis) and autosomal recessive CELENA syndrome (facial dysmorphism, immunodeficiency, livedo, and short stature). There are currently insufficient data to determine if this particular variant does or does not cause increased risk. Therefore, the contribution of this variant to the relative risk of cancer cannot be established from this analysis. Retrospective analyses have estimated that 82.1% to 91.2% of the time, if a VUS is reclassified, it is downgraded to benign or likely benign, while 8.7% to 17.9% are upgraded from VUS to pathogenic. Therefore, VUS should not be used to alter medical management. If as a result of ongoing reclassification efforts, [facility] should determine that this variant becomes clinically actionable, an amended report will be sent to  the ordering healthcare provider for management.    The patient location is: home      Visit type: audiovisual    Face to Face time with patient: 30 minutes  >40 minutes of total time spent on the encounter, which includes face to face time and non-face to face time preparing to see the patient (eg, review of tests), Obtaining and/or reviewing separately obtained history, Documenting clinical information in the electronic or other health record, Independently interpreting results (not separately reported) and communicating results to the patient/family/caregiver, or Care coordination (not separately reported).         Each patient to whom he or she provides medical services by telemedicine is:  (1) informed of the relationship between the physician and patient and the respective role of any other health care provider with respect to management of the patient; and (2) notified that he or she may decline to receive medical services by telemedicine and may withdraw from such care at any time.    DALE MURCIA, PhD

## 2024-04-17 LAB — PSYCHE PATHOLOGY RESULT: NORMAL

## 2024-04-19 ENCOUNTER — HOSPITAL ENCOUNTER (OUTPATIENT)
Dept: RADIOLOGY | Facility: HOSPITAL | Age: 54
Discharge: HOME | End: 2024-04-19
Payer: OTHER GOVERNMENT

## 2024-04-19 DIAGNOSIS — S39.012A STRAIN OF BACK: Primary | ICD-10-CM

## 2024-04-19 DIAGNOSIS — S39.012A STRAIN OF BACK: ICD-10-CM

## 2024-04-19 PROCEDURE — 72100 X-RAY EXAM L-S SPINE 2/3 VWS: CPT | Mod: TC

## 2024-04-22 NOTE — PROGRESS NOTES
Subjective:       Patient ID: Brenden Mccauley is a 54 y.o. female.    Surgeon: Dr. Christina Romero    1. Right Breast Cancer Stage IA (T2(m2) N0(I+)M0)--Diagnosed 24    2. Iron Deficiency Anemia h/o Gastric sleeve in     Biopsy/pathology: Right and left breast biopsies done 24--10:00 4CMFN mass with invasive mammary carcinoma with mixed ductal and lobular features, Grade 2, at least 9mm, associated calcifications, ER 86%, UT 80%, Her2 1+ negative/low, Ki67 23%, 3:00 middle depth calcifications with stromal fibrosis, sclerosing adenosis; Left breast UIQ middle depth calcifications with stromal fibrosis, apocrine metaplasia, sclerosing adenosis, fibroadenomatoid change.  Surgery/pathology:  Bilateral mastectomies done 3/15/24--Right breast with multifocal invasive carcinoma with mixed ductal and lobular features 26mm and 3mm, +PNI, 2/11 lymph nodes with isolated tumor cells; left breast mastectomy with benign breast tissue, fibrocystic changes, site of prior biopsy. Oncotype DX testing with score 14, risk of recurrence with AI alone 4%, no chemotherapy benefit.   Imagin. Screening MMG done 23--on right irregular mass with associated calcifications at 9 o'clock posterior depth.  There are calcifications in the right breast lower inner quadrant, middle depth. There is an asymmetry in the medial right breast, anterior depth; on left calcifications noted at 3 o'clock, posterior depth, in the medial left breast, anterior to middle depth.  There is an asymmetry with possible associated architectural distortion in the medial left breast, middle depth. BIRADS-0; additional imaging needed.     Bilateral diagnostic MMG/US done 1/3/24--right MMG persistent 1.4cm irregular mass with spiculated margins and associated architectural distortion at 10:00, diffuse calcifications throughout right breast, on US 1.4cm irregular hypoechoic mass, right axilla with no adenopathy; left MMG with diffuse  calcifications left breast most concentrated in UI and UO quadrants, assymetry in left breast effaced out, left US with two morphologically similar parallel oval avascular hypoechoic masses with circumscribed margins  measuring 0.9 cm x 0.2 cm x 0.5 cm; at 4 o'clock 5 cm FN and 1.0 cm x 0.3 cm x 0.8 cm; at 4 o'clock 3 cm from the nipple, masses are without significant change mammographically dating back at least to the 2021 exam, and are also likely without significant change dating back to the 2017 exam allowing for differences in technique and interval reduction mammoplast. No suspicious sonographic abnormality is evident in the left breast.. Left axilla demonstrates no significant left axillary adenopathy. BIRADS-4 suspicious; biopsy recommended.  Breast MRI 2/8/24-- Right breast upper outer quadrant, lower outer quadrant, and central region posterior to middle depths irregular heterogeneously enhancing spiculated mass with regional clumped non mass enhancement extending away superiorly, inferiorly, and anteriorly measures 5.7 x 2.6 x 7.2 cm ([id number]) in total extent and correlates with biopsy-proven malignancy.  BI-RADS 6: Known biopsy-proven malignancy.  No abnormal enhancement associated with the recent benign stereotactic guided core needle biopsy sites for calcifications in both breasts (right breast 3:00 axis middle depth and left breast upper inner quadrant middle depth).  There are multiple hematomas in the right breast upper inner and lower inner quadrants in the anterior depth related to the 3:00 axis middle depth core needle biopsy.  BI-RADS 2: Benign. No MRI evidence of malignancy in the left breast.    [employer] BRCA1 and BRCA2 panel, and 84 individual genes with +heterozygous for BLM mutation, pathogenic variant (predisposition to colorectal cancer), POLE heterozygous mutation VUS.     Treatment history:  Bilateral mastectomies with right SLN biopsies with tissue expander reconstruction done 3/15/24.      Current treatment plan: Adjuvant Femara to start 24    Chief Complaint: Other Misc (Pt reports no concerns today.)    HPI  Patient presents for follow-up of breast cancer and Oncotype DX testing results. Score has returned low 14. No chemotherapy recommended. She is meeting with Dr. David soon about moving forward with reconstruction.     Past Medical History:   Diagnosis Date    Abnormal uterine bleeding     Anemia     Anxiety     Cancer     right breast    Depression     Diabetes mellitus     Endometriosis of uterus     Fibroid     Heart attack     Hyperlipidemia     Menopause 2017    Obesity, unspecified     BMI 32.10    Pregnancy     Miscarriage    Sleep apnea     no cpap      Past Surgical History:   Procedure Laterality Date    ABDOMINAL SURGERY      APPENDECTOMY      BREAST SURGERY Bilateral     breast reduction    CERVICAL BIOPSY  W/ LOOP ELECTRODE EXCISION      CERVIX LESION DESTRUCTION       SECTION      COSMETIC SURGERY      Breast reduction    DILATION AND CURETTAGE OF UTERUS      ENDOMETRIAL ABLATION      gastric sleeve      HYSTERECTOMY      HYSTEROSCOPY      INSERTION OF BREAST TISSUE EXPANDER Bilateral 3/15/2024    Procedure: INSERTION, TISSUE EXPANDER, BREAST (bilateral breast recon with placement of tissue expanders and acellular dermal matrix, use of intraoperative fluoroscein angiography);  Surgeon: Ubaldo David MD;  Location: [facility] OR;  Service: Plastics;  Laterality: Bilateral;  300mL filled for each tissue expander    JOINT REPLACEMENT      bilateral knees    LUMBAR LAMINECTOMY N/A 2023    Procedure: LAMINECTOMY, SPINE, LUMBAR L4 SNS:SSEP/EMG;  Surgeon: Kamlesh Gomez MD;  Location: [facility] OR;  Service: Orthopedics;  Laterality: N/A;    LUMBAR LAMINECTOMY N/A 2023    Procedure: LAMINECTOMY, SPINE, LUMBAR L4;  Surgeon: Kamlesh Gomez MD;  Location: [address];  Service: Neurosurgery;  Laterality: N/A;    SENTINEL LYMPH  NODE BIOPSY Right 3/15/2024    Procedure: BIOPSY, LYMPH NODE, SENTINEL - RIGHT MagTrace Injected in office SentiMag needed No NUC MED;  Surgeon: Christina Romero MD;  Location: [facility] OR;  Service: General;  Laterality: Right;  MagTrace Injected in office  SentiMag needed  No NUC MED    SIMPLE MASTECTOMY Bilateral 3/15/2024    Procedure: MASTECTOMY, SIMPLE - BILATERAL;  Surgeon: Christina Romero MD;  Location: [facility] OR;  Service: General;  Laterality: Bilateral;    SPINE SURGERY      TONSILLECTOMY      TOTAL KNEE ARTHROPLASTY Bilateral     TUBAL LIGATION      WISDOM TOOTH EXTRACTION       Family History   Problem Relation Name Age of Onset    Diabetes type I Mother Paul Hansel     Multiple myeloma Mother Paul Hansel 55    Diabetes Mother Paul Hansel     Hypertension Mother Paul Coughlinandrewgeremiasrolandakristyn     Kidney disease Mother Paul Coughlinmariankristyn     Alzheimer's disease Father              No Known Problems Sister      No Known Problems Brother      Pancreatic cancer Maternal Grandmother  70    Breast cancer Paternal Grandmother Claudette Hansel 55    Liver cancer Paternal Grandmother Claudette Hansel 55    Breast cancer Paternal Aunt Lesley Fulton 62     Social History     Socioeconomic History    Marital status: Single   Tobacco Use    Smoking status: Every Day     Average packs/day: 0.5 packs/day for 5.0 years (2.5 ttl pk-yrs)     Types: Cigarettes     Start date: 2018     Last attempt to quit: 2018     Years since quittin.3    Smokeless tobacco: Never    Tobacco comments:     1 pk every 3 days    Substance and Sexual Activity    Alcohol use: Not Currently     Alcohol/week: 1.0 standard drink of alcohol     Types: 1 Glasses of wine per week    Drug use: Never    Sexual activity: Not Currently     Partners: Male     Birth control/protection: Abstinence     Social Determinants of Health     Financial Resource Strain: Low Risk  (2024)    Received from [facility]  [facility] Subsidi[facility] and Affiliates    Overall Financial Resource Strain (CARDIA)     Difficulty of Paying Living Expenses: Not hard at all   Food Insecurity: No Food Insecurity (4/22/2024)    [facility]    Hunger Vital Sign     Worried About Running Out of Food in the Last Year: Never true     Ran Out of Food in the Last Year: Never true   Transportation Needs: No Transportation Needs (4/22/2024)    [facility]    PRAPARE - Transportation     Lack of Transportation (Medical): No     Lack of Transportation (Non-Medical): No   Physical Activity: Inactive (4/22/2024)    [facility]    Exercise Vital Sign     Days of Exercise per Week: 0 days     Minutes of Exercise per Session: 0 min   Stress: Stress Concern Present (4/22/2024)    [facility]    [employer] - Occupational Stress Questionnaire     Feeling of Stress : Rather much   Social Connections: Moderately Isolated (4/22/2024)    [facility]    Social Connection and Isolation Panel [NHANES]     Frequency of Communication with Friends and Family: More than three times a week     Frequency of Social Gatherings with Friends and Family: Once a week     Attends Church Services: 1 to 4 times per year     Active Member of Clubs or Organizations: No     Attends Club or Organization Meetings: Never     Marital Status:        Review of patient's allergies indicates:   Allergen Reactions    Latex, natural rubber Hives, Anxiety, Itching, Palpitations and Swelling     Other Reaction(s):  Unknown    Tomato (solanum lycopersicum) Hives and Swelling      Current Outpatient Medications on File Prior to Visit   Medication Sig Dispense Refill    ALPRAZolam (XANAX) 0.25 MG tablet Take by mouth.      aspirin (ECOTRIN) 81 MG EC tablet Take 81 mg by mouth once daily.      azelastine (ASTELIN) 137 mcg (0.1 %) nasal spray SMARTSIG:Both Nares      blood sugar diagnostic (ONETOUCH ULTRA TEST) Strp CHECK SUGARS DAILY      blood sugar diagnostic (TRUE METRIX GLUCOSE TEST STRIP) Strp CHECK SUGARS DAILY      blood-glucose meter kit Check sugars daily. For T2DM without insulin (E11.9)      busPIRone (BUSPAR) 30 MG Tab Take 30 mg by mouth 2 (two) times a day.      carvediloL (COREG) 3.125 MG tablet Take 3.125 mg by mouth 2 (two) times daily.      celecoxib (CELEBREX) 200 MG capsule       docusate sodium (COLACE) 100 MG capsule Take 100 mg by mouth.      etodolac (LODINE) 200 MG Cap Take 200 mg by mouth 2 (two) times daily.      FEROSUL 325 mg (65 mg iron) Tab tablet Take by mouth every other day.      fexofenadine (ALLEGRA) 180 MG tablet Take 1 tablet by mouth every morning.      lamoTRIgine (LAMICTAL) 100 MG tablet Take 100 mg by mouth once daily.      meloxicam (MOBIC) 15 MG tablet Take 15 mg by mouth once daily.      metFORMIN (GLUCOPHAGE) 500 MG tablet Take 500 mg by mouth 2 (two) times daily with meals.      montelukast (SINGULAIR) 10 mg tablet Take 10 mg by mouth once daily.      mupirocin (BACTROBAN) 2 % ointment SMARTSI Application Topical 2-3 Times Daily      ondansetron (ZOFRAN-ODT) 4 MG TbDL Take by mouth.      oxyCODONE (ROXICODONE) 5 MG immediate release tablet Take 1 tablet (5 mg total) by mouth every 4 (four) hours as needed for Pain. 20 tablet 0    oxyCODONE-acetaminophen (PERCOCET) 5-325 mg per tablet Take by mouth.      pantoprazole (PROTONIX) 20 MG tablet Take 20 mg by mouth once daily.      rosuvastatin (CRESTOR) 10 MG tablet Take 10 mg by mouth once daily.      sertraline 200 mg Cap Take 200 mg  "by mouth once daily.      traMADoL (ULTRAM) 50 mg tablet Take 1 tablet (50 mg total) by mouth every 6 (six) hours. 60 tablet 0    traZODone (DESYREL) 100 MG tablet Take 100 mg by mouth every evening.      vitamin D (VITAMIN D3) 1000 units Tab Take 1,000 Units by mouth once daily.      cyclobenzaprine (FLEXERIL) 5 MG tablet Take 5 mg by mouth. (Patient not taking: Reported on 4/25/2024)      ramelteon (ROZEREM) 8 mg tablet Take 8 mg by mouth once daily. (Patient not taking: Reported on 4/25/2024)      sulfamethoxazole-trimethoprim 800-160mg (BACTRIM DS) 800-160 mg Tab Take 1 tablet by mouth 2 (two) times daily. (Patient not taking: Reported on 4/25/2024)       No current facility-administered medications on file prior to visit.      Review of Systems   Constitutional:  Negative for appetite change and unexpected weight change.   HENT:  Negative for mouth sores.    Eyes:  Negative for visual disturbance.   Respiratory:  Negative for cough and shortness of breath.    Cardiovascular:  Negative for chest pain.   Gastrointestinal:  Negative for abdominal pain and diarrhea.   Genitourinary:  Negative for frequency.   Musculoskeletal:  Negative for back pain.   Integumentary:  Negative for rash.        S/p bilateral mastectomies with some soreness   Neurological:  Negative for headaches.   Hematological:  Negative for adenopathy.   Psychiatric/Behavioral:  The patient is nervous/anxious.             Vitals:    04/25/24 0950   BP: 123/83   Pulse: 89   Resp: 14   Temp: 98.2 °F (36.8 °C)   TempSrc: Oral   SpO2: 97%   Weight: 84.3 kg (185 lb 14.4 oz)   Height: 5' 4" (1.626 m)        Physical Exam  Constitutional:       Appearance: Normal appearance.   HENT:      Head: Normocephalic.      Nose: Nose normal.      Mouth/Throat:      Mouth: Mucous membranes are moist.   Eyes:      Extraocular Movements: Extraocular movements intact.      Conjunctiva/sclera: Conjunctivae normal.   Cardiovascular:      Rate and Rhythm: Normal rate " and regular rhythm.   Pulmonary:      Effort: Pulmonary effort is normal.      Breath sounds: Normal breath sounds.   Chest:      Comments: S/p bilateral mastectomies with tissue expanders in place, incisions healing  Abdominal:      General: Bowel sounds are normal. There is no distension.      Palpations: Abdomen is soft.      Tenderness: There is no abdominal tenderness.   Musculoskeletal:         General: Normal range of motion.   Skin:     General: Skin is warm.   Neurological:      General: No focal deficit present.      Mental Status: She is alert and oriented to person, place, and time.   Psychiatric:         Mood and Affect: Mood normal.         Judgment: Judgment normal.       Lab Visit on 04/25/2024   Component Date Value Ref Range Status    WBC 04/25/2024 7.11  4.50 - 11.50 x10(3)/mcL Final    RBC 04/25/2024 3.91 (L)  4.20 - 5.40 x10(6)/mcL Final    Hgb 04/25/2024 10.3 (L)  12.0 - 16.0 g/dL Final    Hct 04/25/2024 31.6 (L)  37.0 - 47.0 % Final    MCV 04/25/2024 80.8  80.0 - 94.0 fL Final    MCH 04/25/2024 26.3 (L)  27.0 - 31.0 pg Final    MCHC 04/25/2024 32.6 (L)  33.0 - 36.0 g/dL Final    RDW 04/25/2024 20.4 (H)  11.5 - 17.0 % Final    Platelet 04/25/2024 446 (H)  130 - 400 x10(3)/mcL Final    MPV 04/25/2024 9.8  7.4 - 10.4 fL Final    Neut % 04/25/2024 47.1  % Final    Lymph % 04/25/2024 44.9  % Final    Mono % 04/25/2024 5.3  % Final    Eos % 04/25/2024 2.1  % Final    Basophil % 04/25/2024 0.6  % Final    Lymph # 04/25/2024 3.19  0.6 - 4.6 x10(3)/mcL Final    Neut # 04/25/2024 3.35  2.1 - 9.2 x10(3)/mcL Final    Mono # 04/25/2024 0.38  0.1 - 1.3 x10(3)/mcL Final    Eos # 04/25/2024 0.15  0 - 0.9 x10(3)/mcL Final    Baso # 04/25/2024 0.04  <=0.2 x10(3)/mcL Final    IG# 04/25/2024 0.00  0 - 0.04 x10(3)/mcL Final    IG% 04/25/2024 0.0  % Final           Assessment:       1. Malignant neoplasm of upper-outer quadrant of right breast in female, estrogen receptor positive    2. Menopause    3. Iron  deficiency anemia secondary to inadequate dietary iron intake      Plan:       Patient with right breast cancer s/p bilateral mastectomies done 3/15/24. Multifocal breast cancer, mixed ductal and lobular, 26mm and 3mm, with +PNI, and 2/11 lymph nodes with ITC, ER and WI strongly positive and Her2 negative with Ki67 23%.  Per NCCN, recommended to have Oncotype, and score returned low risk 14.   No radiation recommended due to only isolated tumor cells in LN.  Patient is post-menopausal, JENNIFER age 40 for fibroids and endometriosis, and was on +HRT when she was diagnosed as post-menopausal since age 44. HRT stopped at diagnosis.     Start Femara.   Obtain baseline DEXA before RTC.     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 There are no clear guidelines for screening but suggested to start screening colonoscopy age 38 and continue every 5 years.   Will send referral to GI for colonoscopy. She has not had one in several years.     She has known MARC, that is still ongoing per recent labs. She is on oral iron, with no response. She has required IV iron in the past. She has a h/o gastric sleeve surgery in 2020, so suspect this is the culprit.    Will schedule IV InFed next week.     F/u in 8 weeks Femara toxicity check. Will also repeat labs for iron deficiency.       All questions answered at this time.      Jailene Jose MD

## 2024-04-23 ENCOUNTER — HOSPITAL ENCOUNTER (OUTPATIENT)
Dept: RADIOLOGY | Facility: HOSPITAL | Age: 54
Discharge: HOME | End: 2024-04-23
Attending: SURGERY
Payer: OTHER GOVERNMENT

## 2024-04-23 ENCOUNTER — PATIENT MESSAGE (OUTPATIENT)
Dept: ORTHOPEDIC SURGERY | Facility: CLINIC | Age: 54
End: 2024-04-23
Payer: OTHER GOVERNMENT

## 2024-04-23 DIAGNOSIS — Z17.0 MALIGNANT NEOPLASM OF UPPER-OUTER QUADRANT OF RIGHT BREAST IN FEMALE, ESTROGEN RECEPTOR POSITIVE: ICD-10-CM

## 2024-04-23 DIAGNOSIS — Z17.0 ESTROGEN RECEPTOR POSITIVE: ICD-10-CM

## 2024-04-23 DIAGNOSIS — C50.411 CARCINOMA OF UPPER-OUTER QUADRANT OF FEMALE BREAST, RIGHT: ICD-10-CM

## 2024-04-23 DIAGNOSIS — C50.411 MALIGNANT NEOPLASM OF UPPER-OUTER QUADRANT OF RIGHT BREAST IN FEMALE, ESTROGEN RECEPTOR POSITIVE: ICD-10-CM

## 2024-04-23 PROCEDURE — 74174 CTA ABD&PLVS W/CONTRAST: CPT | Mod: TC

## 2024-04-23 PROCEDURE — 25500020 PHARM REV CODE 255: Performed by: SURGERY

## 2024-04-23 RX ADMIN — IOHEXOL 110 ML: 350 INJECTION, SOLUTION INTRAVENOUS at 02:04

## 2024-04-24 DIAGNOSIS — D50.0 IRON DEFICIENCY ANEMIA DUE TO CHRONIC BLOOD LOSS: Primary | ICD-10-CM

## 2024-04-25 ENCOUNTER — OFFICE VISIT (OUTPATIENT)
Dept: HEMATOLOGY/ONCOLOGY | Facility: CLINIC | Age: 54
End: 2024-04-25
Payer: OTHER GOVERNMENT

## 2024-04-25 ENCOUNTER — LAB VISIT (OUTPATIENT)
Dept: LAB | Facility: HOSPITAL | Age: 54
End: 2024-04-25
Attending: INTERNAL MEDICINE
Payer: OTHER GOVERNMENT

## 2024-04-25 VITALS
HEIGHT: 64 IN | TEMPERATURE: 98 F | OXYGEN SATURATION: 97 % | HEART RATE: 89 BPM | BODY MASS INDEX: 31.73 KG/M2 | RESPIRATION RATE: 14 BRPM | DIASTOLIC BLOOD PRESSURE: 83 MMHG | SYSTOLIC BLOOD PRESSURE: 123 MMHG | WEIGHT: 185.88 LBS

## 2024-04-25 DIAGNOSIS — D50.0 IRON DEFICIENCY ANEMIA DUE TO CHRONIC BLOOD LOSS: ICD-10-CM

## 2024-04-25 DIAGNOSIS — Z78.0 MENOPAUSE: ICD-10-CM

## 2024-04-25 DIAGNOSIS — D50.8 IRON DEFICIENCY ANEMIA SECONDARY TO INADEQUATE DIETARY IRON INTAKE: ICD-10-CM

## 2024-04-25 DIAGNOSIS — Z17.0 MALIGNANT NEOPLASM OF UPPER-OUTER QUADRANT OF RIGHT BREAST IN FEMALE, ESTROGEN RECEPTOR POSITIVE: Primary | ICD-10-CM

## 2024-04-25 DIAGNOSIS — C50.411 MALIGNANT NEOPLASM OF UPPER-OUTER QUADRANT OF RIGHT BREAST IN FEMALE, ESTROGEN RECEPTOR POSITIVE: Primary | ICD-10-CM

## 2024-04-25 LAB
ALBUMIN SERPL-MCNC: 3.6 G/DL (ref 3.5–5)
ALBUMIN/GLOB SERPL: 1 RATIO (ref 1.1–2)
ALP SERPL-CCNC: 122 UNIT/L (ref 40–150)
ALT SERPL-CCNC: 8 UNIT/L (ref 0–55)
AST SERPL-CCNC: 16 UNIT/L (ref 5–34)
BASOPHILS # BLD AUTO: 0.04 X10(3)/MCL
BASOPHILS NFR BLD AUTO: 0.6 %
BILIRUB SERPL-MCNC: 0.3 MG/DL
BUN SERPL-MCNC: 9.8 MG/DL (ref 9.8–20.1)
CALCIUM SERPL-MCNC: 9 MG/DL (ref 8.4–10.2)
CHLORIDE SERPL-SCNC: 105 MMOL/L (ref 98–107)
CO2 SERPL-SCNC: 24 MMOL/L (ref 22–29)
CREAT SERPL-MCNC: 0.75 MG/DL (ref 0.55–1.02)
EOSINOPHIL # BLD AUTO: 0.15 X10(3)/MCL (ref 0–0.9)
EOSINOPHIL NFR BLD AUTO: 2.1 %
ERYTHROCYTE [DISTWIDTH] IN BLOOD BY AUTOMATED COUNT: 20.4 % (ref 11.5–17)
FERRITIN SERPL-MCNC: 27.97 NG/ML (ref 4.63–204)
GFR SERPLBLD CREATININE-BSD FMLA CKD-EPI: >60 MLS/MIN/1.73/M2
GLOBULIN SER-MCNC: 3.6 GM/DL (ref 2.4–3.5)
GLUCOSE SERPL-MCNC: 104 MG/DL (ref 74–100)
HCT VFR BLD AUTO: 31.6 % (ref 37–47)
HGB BLD-MCNC: 10.3 G/DL (ref 12–16)
IMM GRANULOCYTES # BLD AUTO: 0 X10(3)/MCL (ref 0–0.04)
IMM GRANULOCYTES NFR BLD AUTO: 0 %
IRON SATN MFR SERPL: 6 % (ref 20–50)
IRON SERPL-MCNC: 21 UG/DL (ref 50–170)
LYMPHOCYTES # BLD AUTO: 3.19 X10(3)/MCL (ref 0.6–4.6)
LYMPHOCYTES NFR BLD AUTO: 44.9 %
MCH RBC QN AUTO: 26.3 PG (ref 27–31)
MCHC RBC AUTO-ENTMCNC: 32.6 G/DL (ref 33–36)
MCV RBC AUTO: 80.8 FL (ref 80–94)
MONOCYTES # BLD AUTO: 0.38 X10(3)/MCL (ref 0.1–1.3)
MONOCYTES NFR BLD AUTO: 5.3 %
NEUTROPHILS # BLD AUTO: 3.35 X10(3)/MCL (ref 2.1–9.2)
NEUTROPHILS NFR BLD AUTO: 47.1 %
PLATELET # BLD AUTO: 446 X10(3)/MCL (ref 130–400)
PMV BLD AUTO: 9.8 FL (ref 7.4–10.4)
POTASSIUM SERPL-SCNC: 4.2 MMOL/L (ref 3.5–5.1)
PROT SERPL-MCNC: 7.2 GM/DL (ref 6.4–8.3)
RBC # BLD AUTO: 3.91 X10(6)/MCL (ref 4.2–5.4)
SODIUM SERPL-SCNC: 139 MMOL/L (ref 136–145)
TIBC SERPL-MCNC: 322 UG/DL (ref 70–310)
TIBC SERPL-MCNC: 343 UG/DL (ref 250–450)
TRANSFERRIN SERPL-MCNC: 313 MG/DL (ref 180–382)
WBC # SPEC AUTO: 7.11 X10(3)/MCL (ref 4.5–11.5)

## 2024-04-25 PROCEDURE — 36415 COLL VENOUS BLD VENIPUNCTURE: CPT

## 2024-04-25 PROCEDURE — 83540 ASSAY OF IRON: CPT

## 2024-04-25 PROCEDURE — 99999 PR PBB SHADOW E&M-EST. PATIENT-LVL V: CPT | Mod: PBBFAC,,, | Performed by: INTERNAL MEDICINE

## 2024-04-25 PROCEDURE — 85025 COMPLETE CBC W/AUTO DIFF WBC: CPT

## 2024-04-25 PROCEDURE — 99215 OFFICE O/P EST HI 40 MIN: CPT | Mod: PBBFAC | Performed by: INTERNAL MEDICINE

## 2024-04-25 PROCEDURE — 99215 OFFICE O/P EST HI 40 MIN: CPT | Mod: S$PBB,,, | Performed by: INTERNAL MEDICINE

## 2024-04-25 PROCEDURE — 82728 ASSAY OF FERRITIN: CPT

## 2024-04-25 PROCEDURE — 80053 COMPREHEN METABOLIC PANEL: CPT

## 2024-04-25 RX ORDER — SODIUM CHLORIDE 9 MG/ML
10 INJECTION, SOLUTION INTRAMUSCULAR; INTRAVENOUS; SUBCUTANEOUS
Status: CANCELLED | OUTPATIENT
Start: 2024-05-01

## 2024-04-25 RX ORDER — ACETAMINOPHEN 325 MG/1
1000 TABLET ORAL
Status: CANCELLED | OUTPATIENT
Start: 2024-05-01

## 2024-04-25 RX ORDER — DIPHENHYDRAMINE HYDROCHLORIDE 50 MG/ML
50 INJECTION INTRAMUSCULAR; INTRAVENOUS ONCE AS NEEDED
Status: CANCELLED | OUTPATIENT
Start: 2024-05-01

## 2024-04-25 RX ORDER — EPINEPHRINE 0.3 MG/.3ML
0.3 INJECTION SUBCUTANEOUS ONCE AS NEEDED
Status: CANCELLED | OUTPATIENT
Start: 2024-05-01

## 2024-04-25 RX ORDER — HEPARIN 100 UNIT/ML
500 SYRINGE INTRAVENOUS
Status: CANCELLED | OUTPATIENT
Start: 2024-05-01

## 2024-04-25 RX ORDER — LETROZOLE 2.5 MG/1
2.5 TABLET, FILM COATED ORAL DAILY
Qty: 30 TABLET | Refills: 3 | Status: SHIPPED | OUTPATIENT
Start: 2024-04-25 | End: 2025-04-25

## 2024-05-01 ENCOUNTER — CLINICAL SUPPORT (OUTPATIENT)
Dept: HEMATOLOGY/ONCOLOGY | Facility: CLINIC | Age: 54
End: 2024-05-01
Payer: OTHER GOVERNMENT

## 2024-05-01 ENCOUNTER — INFUSION (OUTPATIENT)
Dept: INFUSION THERAPY | Facility: HOSPITAL | Age: 54
End: 2024-05-01
Attending: INTERNAL MEDICINE
Payer: OTHER GOVERNMENT

## 2024-05-01 VITALS
SYSTOLIC BLOOD PRESSURE: 121 MMHG | BODY MASS INDEX: 31.93 KG/M2 | TEMPERATURE: 98 F | DIASTOLIC BLOOD PRESSURE: 84 MMHG | WEIGHT: 186 LBS | OXYGEN SATURATION: 97 % | HEART RATE: 86 BPM

## 2024-05-01 DIAGNOSIS — D50.8 IRON DEFICIENCY ANEMIA SECONDARY TO INADEQUATE DIETARY IRON INTAKE: Primary | ICD-10-CM

## 2024-05-01 DIAGNOSIS — Z17.0 MALIGNANT NEOPLASM OF UPPER-OUTER QUADRANT OF RIGHT BREAST IN FEMALE, ESTROGEN RECEPTOR POSITIVE: ICD-10-CM

## 2024-05-01 DIAGNOSIS — C50.411 MALIGNANT NEOPLASM OF UPPER-OUTER QUADRANT OF RIGHT BREAST IN FEMALE, ESTROGEN RECEPTOR POSITIVE: ICD-10-CM

## 2024-05-01 PROCEDURE — 99212 OFFICE O/P EST SF 10 MIN: CPT | Mod: PBBFAC,25

## 2024-05-01 PROCEDURE — 63600175 PHARM REV CODE 636 W HCPCS: Mod: JZ,JG | Performed by: INTERNAL MEDICINE

## 2024-05-01 PROCEDURE — 99999 PR PBB SHADOW E&M-EST. PATIENT-LVL II: CPT | Mod: PBBFAC,,,

## 2024-05-01 PROCEDURE — 96366 THER/PROPH/DIAG IV INF ADDON: CPT

## 2024-05-01 PROCEDURE — 25000003 PHARM REV CODE 250: Performed by: INTERNAL MEDICINE

## 2024-05-01 PROCEDURE — 96367 TX/PROPH/DG ADDL SEQ IV INF: CPT

## 2024-05-01 PROCEDURE — 96365 THER/PROPH/DIAG IV INF INIT: CPT

## 2024-05-01 RX ORDER — ACETAMINOPHEN 500 MG
1000 TABLET ORAL
Status: CANCELLED | OUTPATIENT
Start: 2024-05-01

## 2024-05-01 RX ORDER — SODIUM CHLORIDE 9 MG/ML
10 INJECTION, SOLUTION INTRAMUSCULAR; INTRAVENOUS; SUBCUTANEOUS
Status: DISCONTINUED | OUTPATIENT
Start: 2024-05-01 | End: 2024-05-01 | Stop reason: HOSPADM

## 2024-05-01 RX ORDER — DIPHENHYDRAMINE HYDROCHLORIDE 50 MG/ML
50 INJECTION INTRAMUSCULAR; INTRAVENOUS ONCE AS NEEDED
OUTPATIENT
Start: 2024-05-01

## 2024-05-01 RX ORDER — ACETAMINOPHEN 500 MG
1000 TABLET ORAL
Status: COMPLETED | OUTPATIENT
Start: 2024-05-01 | End: 2024-05-01

## 2024-05-01 RX ORDER — HEPARIN 100 UNIT/ML
500 SYRINGE INTRAVENOUS
OUTPATIENT
Start: 2024-05-01

## 2024-05-01 RX ORDER — FAMOTIDINE 10 MG/ML
20 INJECTION INTRAVENOUS
Status: CANCELLED
Start: 2024-05-01

## 2024-05-01 RX ORDER — SODIUM CHLORIDE 9 MG/ML
10 INJECTION, SOLUTION INTRAMUSCULAR; INTRAVENOUS; SUBCUTANEOUS
Status: CANCELLED | OUTPATIENT
Start: 2024-05-01

## 2024-05-01 RX ORDER — EPINEPHRINE 0.3 MG/.3ML
0.3 INJECTION SUBCUTANEOUS ONCE AS NEEDED
OUTPATIENT
Start: 2024-05-01

## 2024-05-01 RX ORDER — FAMOTIDINE 10 MG/ML
20 INJECTION INTRAVENOUS
Status: COMPLETED | OUTPATIENT
Start: 2024-05-01 | End: 2024-05-01

## 2024-05-01 RX ADMIN — SODIUM CHLORIDE 1475 MG: 9 INJECTION, SOLUTION INTRAVENOUS at 09:05

## 2024-05-01 RX ADMIN — ACETAMINOPHEN 1000 MG: 500 TABLET, FILM COATED ORAL at 08:05

## 2024-05-01 RX ADMIN — DIPHENHYDRAMINE HYDROCHLORIDE 50 MG: 50 INJECTION, SOLUTION INTRAMUSCULAR; INTRAVENOUS at 08:05

## 2024-05-01 RX ADMIN — SODIUM CHLORIDE 25 MG: 9 INJECTION, SOLUTION INTRAVENOUS at 08:05

## 2024-05-01 RX ADMIN — FAMOTIDINE 20 MG: 10 INJECTION, SOLUTION INTRAVENOUS at 08:05

## 2024-06-03 ENCOUNTER — OFFICE VISIT (OUTPATIENT)
Dept: ORTHOPEDIC SURGERY | Facility: CLINIC | Age: 54
End: 2024-06-03
Payer: OTHER GOVERNMENT

## 2024-06-03 VITALS — HEIGHT: 64 IN | BODY MASS INDEX: 31.93 KG/M2

## 2024-06-03 DIAGNOSIS — M54.16 LUMBAR RADICULOPATHY: Primary | ICD-10-CM

## 2024-06-03 DIAGNOSIS — M48.07 SPINAL STENOSIS, LUMBOSACRAL REGION: ICD-10-CM

## 2024-06-03 DIAGNOSIS — M48.02 SPINAL STENOSIS, CERVICAL REGION: ICD-10-CM

## 2024-06-03 PROCEDURE — 99213 OFFICE O/P EST LOW 20 MIN: CPT | Mod: S$GLB,,, | Performed by: ORTHOPAEDIC SURGERY

## 2024-06-03 NOTE — PROGRESS NOTES
Date of Surgery:  August 22, 2023    Procedure:  L4 laminectomy    History: Brenden Franklin Parisa is seen today for follow-up following the above listed procedure.  She still has intermittent left lower extremity pain and weakness, she had a fall a few weeks ago.  She is undergoing breast reconstructive surgery in July.  The patient also endorses left upper extremity radiculopathy.  This is as bothersome as her lower extremity radiculopathy.    Exam: Incision is healed. There is no sign of infection. Neuro exam is stable. No signs of DVT.    Radiographs:  No new films today    Assessment/Plan:  Today we discussed options, given her ongoing cervical and lumbar radiculopathy we will order an MRI of the cervical spine without contrast, and lumbar spine with and without.  I will see her back to discuss options, we will need a relatively short-term solution as she is having breast reconstructive surgery in 2 months.

## 2024-06-17 ENCOUNTER — HOSPITAL ENCOUNTER (OUTPATIENT)
Dept: RADIOLOGY | Facility: HOSPITAL | Age: 54
Discharge: HOME | End: 2024-06-17
Attending: INTERNAL MEDICINE
Payer: OTHER GOVERNMENT

## 2024-06-17 DIAGNOSIS — Z78.0 MENOPAUSE: ICD-10-CM

## 2024-06-17 PROCEDURE — 77080 DXA BONE DENSITY AXIAL: CPT | Mod: TC

## 2024-06-17 PROCEDURE — 77080 DXA BONE DENSITY AXIAL: CPT | Mod: 26,,, | Performed by: STUDENT IN AN ORGANIZED HEALTH CARE EDUCATION/TRAINING PROGRAM

## 2024-06-18 PROBLEM — F17.200 NICOTINE DEPENDENCE: Chronic | Status: ACTIVE | Noted: 2022-10-04

## 2024-06-18 PROBLEM — Z86.018 HISTORY OF UTERINE LEIOMYOMA: Status: ACTIVE | Noted: 2022-10-04

## 2024-06-18 PROBLEM — Z96.653 HISTORY OF BILATERAL KNEE REPLACEMENT: Status: ACTIVE | Noted: 2022-10-04

## 2024-06-18 PROBLEM — F31.81 BIPOLAR II DISORDER: Status: ACTIVE | Noted: 2022-10-04

## 2024-06-18 PROBLEM — I25.10 ATHEROSCLEROSIS OF CORONARY ARTERY: Status: ACTIVE | Noted: 2022-10-04

## 2024-06-18 PROBLEM — M54.50 CHRONIC LOW BACK PAIN: Status: ACTIVE | Noted: 2022-10-04

## 2024-06-18 PROBLEM — M25.562 LEFT KNEE PAIN: Status: ACTIVE | Noted: 2022-10-04

## 2024-06-18 PROBLEM — I10 HYPERTENSION: Status: ACTIVE | Noted: 2022-10-04

## 2024-06-18 PROBLEM — K21.9 GASTROESOPHAGEAL REFLUX DISEASE: Status: ACTIVE | Noted: 2024-06-18

## 2024-06-18 PROBLEM — G89.29 CHRONIC LOW BACK PAIN: Status: ACTIVE | Noted: 2022-10-04

## 2024-06-18 PROBLEM — Z98.61: Status: ACTIVE | Noted: 2022-10-04

## 2024-06-18 PROBLEM — G47.00 INSOMNIA: Status: ACTIVE | Noted: 2024-06-18

## 2024-06-18 PROBLEM — E66.01 MORBID OBESITY: Status: ACTIVE | Noted: 2022-10-04

## 2024-06-18 PROBLEM — M47.817 SPONDYLOSIS OF LUMBOSACRAL SPINE WITHOUT MYELOPATHY: Status: ACTIVE | Noted: 2022-10-04

## 2024-06-18 PROBLEM — E78.49 OTHER HYPERLIPIDEMIA: Status: ACTIVE | Noted: 2022-10-04

## 2024-06-18 PROBLEM — M19.90 LOCALIZED OSTEOARTHROSIS UNCERTAIN IF PRIMARY OR SECONDARY: Status: ACTIVE | Noted: 2024-06-18

## 2024-06-18 PROBLEM — E11.9 TYPE 2 DIABETES MELLITUS WITHOUT COMPLICATION: Status: ACTIVE | Noted: 2023-08-03

## 2024-06-18 PROBLEM — G47.33 OBSTRUCTIVE SLEEP APNEA SYNDROME: Status: ACTIVE | Noted: 2024-06-18

## 2024-06-18 PROBLEM — F43.10 POSTTRAUMATIC STRESS DISORDER: Status: ACTIVE | Noted: 2022-10-04

## 2024-06-18 PROBLEM — E55.9 VITAMIN D DEFICIENCY: Status: ACTIVE | Noted: 2022-10-04

## 2024-06-18 PROBLEM — F33.9 RECURRENT MAJOR DEPRESSIVE DISORDER: Status: ACTIVE | Noted: 2024-06-18

## 2024-06-18 PROBLEM — D25.9 LEIOMYOMA OF UTERUS: Status: ACTIVE | Noted: 2024-06-18

## 2024-06-18 PROBLEM — F41.1 GENERALIZED ANXIETY DISORDER WITH PANIC ATTACKS: Status: ACTIVE | Noted: 2022-10-04

## 2024-06-18 PROBLEM — F41.0 GENERALIZED ANXIETY DISORDER WITH PANIC ATTACKS: Status: ACTIVE | Noted: 2022-10-04

## 2024-06-18 NOTE — PROGRESS NOTES
Subjective:       Patient ID: Brenden Mccauley is a 54 y.o. female.    Surgeon: Dr. Christina Romero    1. Right Breast Cancer Stage IA (T2(m2) N0(I+)M0)--Diagnosed 24    2. Iron Deficiency Anemia h/o Gastric sleeve in     Biopsy/pathology: Right and left breast biopsies done 24--10:00 4CMFN mass with invasive mammary carcinoma with mixed ductal and lobular features, Grade 2, at least 9mm, associated calcifications, ER 86%, VA 80%, Her2 1+ negative/low, Ki67 23%, 3:00 middle depth calcifications with stromal fibrosis, sclerosing adenosis; Left breast UIQ middle depth calcifications with stromal fibrosis, apocrine metaplasia, sclerosing adenosis, fibroadenomatoid change.  Surgery/pathology:  Bilateral mastectomies done 3/15/24--Right breast with multifocal invasive carcinoma with mixed ductal and lobular features 26mm and 3mm, +PNI, 2/11 lymph nodes with isolated tumor cells; left breast mastectomy with benign breast tissue, fibrocystic changes, site of prior biopsy. Oncotype DX testing with score 14, risk of recurrence with AI alone 4%, no chemotherapy benefit.   Imagin. Screening MMG done 23--on right irregular mass with associated calcifications at 9 o'clock posterior depth.  There are calcifications in the right breast lower inner quadrant, middle depth. There is an asymmetry in the medial right breast, anterior depth; on left calcifications noted at 3 o'clock, posterior depth, in the medial left breast, anterior to middle depth.  There is an asymmetry with possible associated architectural distortion in the medial left breast, middle depth. BIRADS-0; additional imaging needed.     Bilateral diagnostic MMG/US done 1/3/24--right MMG persistent 1.4cm irregular mass with spiculated margins and associated architectural distortion at 10:00, diffuse calcifications throughout right breast, on US 1.4cm irregular hypoechoic mass, right axilla with no adenopathy; left MMG with diffuse  calcifications left breast most concentrated in UI and UO quadrants, assymetry in left breast effaced out, left US with two morphologically similar parallel oval avascular hypoechoic masses with circumscribed margins  measuring 0.9 cm x 0.2 cm x 0.5 cm; at 4 o'clock 5 cm FN and 1.0 cm x 0.3 cm x 0.8 cm; at 4 o'clock 3 cm from the nipple, masses are without significant change mammographically dating back at least to the 2021 exam, and are also likely without significant change dating back to the 2017 exam allowing for differences in technique and interval reduction mammoplast. No suspicious sonographic abnormality is evident in the left breast.. Left axilla demonstrates no significant left axillary adenopathy. BIRADS-4 suspicious; biopsy recommended.  Breast MRI 2/8/24-- Right breast upper outer quadrant, lower outer quadrant, and central region posterior to middle depths irregular heterogeneously enhancing spiculated mass with regional clumped non mass enhancement extending away superiorly, inferiorly, and anteriorly measures 5.7 x 2.6 x 7.2 cm ([id number]) in total extent and correlates with biopsy-proven malignancy.  BI-RADS 6: Known biopsy-proven malignancy.  No abnormal enhancement associated with the recent benign stereotactic guided core needle biopsy sites for calcifications in both breasts (right breast 3:00 axis middle depth and left breast upper inner quadrant middle depth).  There are multiple hematomas in the right breast upper inner and lower inner quadrants in the anterior depth related to the 3:00 axis middle depth core needle biopsy.  BI-RADS 2: Benign. No MRI evidence of malignancy in the left breast.    [employer] BRCA1 and BRCA2 panel, and 84 individual genes with +heterozygous for BLM mutation, pathogenic variant (predisposition to colorectal cancer), POLE heterozygous mutation VUS.     DEXA:  06/17/24--AP spine 0.6, left femoral neck -0.6, left total -0.2, right femoral neck -0.4, right total -0.1.  Normal bone density.     Treatment history:  Bilateral mastectomies with right SLN biopsies with tissue expander reconstruction done 3/15/24.   InFeD 1500mg x 1 dose on 24.     Current treatment plan:   Adjuvant Femara started on 24.     Chief Complaint: Other Misc (Pt reports no concerns today.)    HPI  Patient presents for follow-up of breast cancer. She is doing well. Started Femara at the end of April. Tolerating well with the exception of hot flashes. Otherwise no side effects. Baseline DEXA with normal bone density. Her breast reconstruction is scheduled for next month with Dr. David. She has lost a few lbs since her last appointment. Received InFeD x 1 dose last month and her anemia has resolved. Admits she no longer takes naps during the day. No new problems reported.     Past Medical History:   Diagnosis Date    Abnormal uterine bleeding 2006    Anemia     Anxiety     Cancer     right breast    Depression     Diabetes mellitus     Endometriosis of uterus     Fibroid     Heart attack     Hyperlipidemia     Hypertension 10/4/2022    Menopause 2017    Obesity, unspecified     BMI 32.10    Pregnancy     Miscarriage    Sleep apnea     no cpap      Past Surgical History:   Procedure Laterality Date    ABDOMINAL SURGERY      APPENDECTOMY      BREAST SURGERY Bilateral     breast reduction    CERVICAL BIOPSY  W/ LOOP ELECTRODE EXCISION      CERVIX LESION DESTRUCTION       SECTION      COSMETIC SURGERY      Breast reduction    DILATION AND CURETTAGE OF UTERUS      ENDOMETRIAL ABLATION      gastric sleeve      HYSTERECTOMY      HYSTEROSCOPY      INSERTION OF BREAST TISSUE EXPANDER Bilateral 3/15/2024    Procedure: INSERTION, TISSUE EXPANDER, BREAST (bilateral breast recon with placement of tissue expanders and acellular dermal matrix, use of intraoperative fluoroscein angiography);  Surgeon: Ubaldo David MD;  Location: [facility] OR;  Service: Plastics;  Laterality: Bilateral;   300mL filled for each tissue expander    JOINT REPLACEMENT      bilateral knees    LUMBAR LAMINECTOMY N/A 2023    Procedure: LAMINECTOMY, SPINE, LUMBAR L4 SNS:SSEP/EMG;  Surgeon: Kamlesh Gomez MD;  Location: [facility];  Service: Orthopedics;  Laterality: N/A;    LUMBAR LAMINECTOMY N/A 2023    Procedure: LAMINECTOMY, SPINE, LUMBAR L4;  Surgeon: Kamlesh Gomez MD;  Location: [address];  Service: Neurosurgery;  Laterality: N/A;    SENTINEL LYMPH NODE BIOPSY Right 3/15/2024    Procedure: BIOPSY, LYMPH NODE, SENTINEL - RIGHT MagTrace Injected in office SentiMag needed No NUC MED;  Surgeon: Christina Romero MD;  Location: [facility] ER;  Service: General;  Laterality: Right;  MagTrace Injected in office  SentiMag needed  No NUC MED    SIMPLE MASTECTOMY Bilateral 3/15/2024    Procedure: MASTECTOMY, SIMPLE - BILATERAL;  Surgeon: Christina Romero MD;  Location: [facility] ER;  Service: General;  Laterality: Bilateral;    SPINE SURGERY      TONSILLECTOMY      TOTAL KNEE ARTHROPLASTY Bilateral     TUBAL LIGATION      WISDOM TOOTH EXTRACTION       Family History   Problem Relation Name Age of Onset    Diabetes type I Mother Paulgregorio Hamm     Multiple myeloma Mother Paul Hamm 55    Diabetes Mother Paul Coughlinzizy     Hypertension Mother Paul Daleboydkristyn     Kidney disease Mother Paul Coughlinizzy     Alzheimer's disease Father              No Known Problems Sister      No Known Problems Brother      Pancreatic cancer Maternal Grandmother  70    Breast cancer Paternal Grandmother Claudette Hansel 55    Liver cancer Paternal Grandmother Claudette Hansel 55    Breast cancer Paternal Aunt Lesley Donaldo 62     Social History     Socioeconomic History    Marital status: Single   Tobacco Use    Smoking status: Every Day     Average packs/day: 0.5 packs/day for 5.0 years (2.5 ttl pk-yrs)     Types: Cigarettes     Start date: 2018     Last attempt to quit: 2018     Years since quitting:  5.4    Smokeless tobacco: Never    Tobacco comments:     1 pk every 3 days    Substance and Sexual Activity    Alcohol use: Not Currently     Alcohol/week: 1.0 standard drink of alcohol     Types: 1 Glasses of wine per week    Drug use: Never    Sexual activity: Not Currently     Partners: Male     Birth control/protection: Abstinence     Social Determinants of Health     Financial Resource Strain: Low Risk  (4/22/2024)    [facility]    Overall Financial Resource Strain (CARDIA)     Difficulty of Paying Living Expenses: Not hard at all   Food Insecurity: No Food Insecurity (4/22/2024)    [facility]    Hunger Vital Sign     Worried About Running Out of Food in the Last Year: Never true     Ran Out of Food in the Last Year: Never true   Transportation Needs: No Transportation Needs (4/22/2024)    [facility]    PRAPARE - Transportation     Lack of Transportation (Medical): No     Lack of Transportation (Non-Medical): No   Physical Activity: Inactive (4/22/2024)    [facility]    Exercise Vital Sign     Days of Exercise per Week: 0 days     Minutes of Exercise per Session: 0 min   Stress: Stress Concern Present (4/22/2024)    [facility]    [employer] - Occupational Stress Questionnaire     Feeling of Stress : Rather much       Review of patient's allergies indicates:   Allergen Reactions    Latex, natural rubber Hives, Anxiety, Itching, Palpitations and Swelling     Other Reaction(s): Unknown    Tomato (solanum lycopersicum) Hives and Swelling       Current Outpatient Medications on File Prior to Visit   Medication Sig Dispense Refill    ALPRAZolam (XANAX) 0.25 MG tablet Take by mouth.      aspirin (ECOTRIN) 81 MG EC tablet Take 81 mg by mouth once daily.      azelastine (ASTELIN) 137 mcg (0.1 %) nasal spray SMARTSIG:Both Nares      blood sugar diagnostic (ONETOUCH ULTRA TEST) Strp CHECK SUGARS DAILY      blood sugar diagnostic (TRUE METRIX GLUCOSE TEST STRIP) Strp CHECK SUGARS DAILY      blood-glucose meter kit Check sugars daily. For T2DM without insulin (E11.9)      busPIRone (BUSPAR) 30 MG Tab Take 30 mg by mouth 2 (two) times a day.      carvediloL (COREG) 3.125 MG tablet Take 3.125 mg by mouth 2 (two) times daily.      celecoxib (CELEBREX) 200 MG capsule       FEROSUL 325 mg (65 mg iron) Tab tablet Take by mouth every other day.      fexofenadine (ALLEGRA) 180 MG tablet Take 1 tablet by mouth every morning.      ipratropium (ATROVENT) 42 mcg (0.06 %) nasal spray by Each Nostril route.      lamoTRIgine (LAMICTAL) 100 MG tablet Take 100 mg by mouth once daily.      letrozole (FEMARA) 2.5 mg Tab Take 1 tablet (2.5 mg total) by mouth once daily. 30 tablet 3    metFORMIN (GLUCOPHAGE) 500 MG tablet Take 500 mg by mouth 2 (two) times daily with meals.      montelukast (SINGULAIR) 10 mg tablet Take 10 mg by mouth once daily.      rosuvastatin (CRESTOR) 10 MG tablet Take 10 mg by mouth once daily.      sertraline 200 mg Cap Take 200 mg by mouth once daily.      traZODone (DESYREL) 100 MG tablet Take 100 mg by mouth every evening.      vitamin D (VITAMIN D3) 1000 units Tab Take 1,000 Units by mouth once daily.      cyclobenzaprine (FLEXERIL) 5 MG tablet Take 5 mg by mouth. (Patient not taking: Reported on 6/24/2024)      docusate sodium (COLACE) 100 MG capsule Take 100 mg by mouth. (Patient not taking: Reported on 6/24/2024)      etodolac (LODINE) 200 MG Cap Take 200 mg by mouth 2 (two) times daily. (Patient not taking: Reported on 6/24/2024)      meloxicam  (MOBIC) 15 MG tablet Take 15 mg by mouth once daily. (Patient not taking: Reported on 2024)      mupirocin (BACTROBAN) 2 % ointment SMARTSI Application Topical 2-3 Times Daily (Patient not taking: Reported on 2024)      ondansetron (ZOFRAN-ODT) 4 MG TbDL Take by mouth. (Patient not taking: Reported on 2024)      oxyCODONE (ROXICODONE) 5 MG immediate release tablet Take 1 tablet (5 mg total) by mouth every 4 (four) hours as needed for Pain. (Patient not taking: Reported on 2024) 20 tablet 0    oxyCODONE-acetaminophen (PERCOCET) 5-325 mg per tablet Take by mouth. (Patient not taking: Reported on 2024)      pantoprazole (PROTONIX) 20 MG tablet Take 20 mg by mouth once daily. (Patient not taking: Reported on 2024)      ramelteon (ROZEREM) 8 mg tablet Take 8 mg by mouth once daily. (Patient not taking: Reported on 2024)      sulfamethoxazole-trimethoprim 800-160mg (BACTRIM DS) 800-160 mg Tab Take 1 tablet by mouth 2 (two) times daily. (Patient not taking: Reported on 2024)      traMADoL (ULTRAM) 50 mg tablet Take 1 tablet (50 mg total) by mouth every 6 (six) hours. (Patient not taking: Reported on 2024) 60 tablet 0     No current facility-administered medications on file prior to visit.      Review of Systems   Constitutional:  Positive for appetite change and unexpected weight change.   HENT:  Negative for mouth sores.    Eyes:  Negative for visual disturbance.   Respiratory:  Negative for cough and shortness of breath.    Cardiovascular:  Negative for chest pain.   Gastrointestinal:  Negative for abdominal pain and diarrhea.   Genitourinary:  Negative for frequency.   Musculoskeletal:  Negative for back pain.   Integumentary:  Negative for rash.   Neurological:  Positive for headaches.   Hematological:  Negative for adenopathy.   Psychiatric/Behavioral:  The patient is nervous/anxious.          Vitals:    24 1332   BP: 124/84   Pulse: 87   Resp: 14   Temp: 98.3 °F  "(36.8 °C)   TempSrc: Oral   SpO2: 96%   Weight: 82 kg (180 lb 11.2 oz)   Height: 5' 4" (1.626 m)     Physical Exam  Constitutional:       Appearance: Normal appearance.   HENT:      Head: Normocephalic.      Nose: Nose normal.      Mouth/Throat:      Mouth: Mucous membranes are moist.   Eyes:      Extraocular Movements: Extraocular movements intact.      Conjunctiva/sclera: Conjunctivae normal.   Cardiovascular:      Rate and Rhythm: Normal rate and regular rhythm.   Pulmonary:      Effort: Pulmonary effort is normal.      Breath sounds: Normal breath sounds.   Chest:      Comments: S/p bilateral mastectomies with tissue expanders in place.   Abdominal:      General: Bowel sounds are normal. There is no distension.      Palpations: Abdomen is soft.      Tenderness: There is no abdominal tenderness.   Musculoskeletal:         General: Normal range of motion.   Skin:     General: Skin is warm.   Neurological:      General: No focal deficit present.      Mental Status: She is alert and oriented to person, place, and time.   Psychiatric:         Mood and Affect: Mood normal.         Judgment: Judgment normal.       Lab Visit on 06/24/2024   Component Date Value Ref Range Status    WBC 06/24/2024 6.83  4.50 - 11.50 x10(3)/mcL Final    RBC 06/24/2024 4.64  4.20 - 5.40 x10(6)/mcL Final    Hgb 06/24/2024 13.2  12.0 - 16.0 g/dL Final    Hct 06/24/2024 40.3  37.0 - 47.0 % Final    MCV 06/24/2024 86.9  80.0 - 94.0 fL Final    MCH 06/24/2024 28.4  27.0 - 31.0 pg Final    MCHC 06/24/2024 32.8 (L)  33.0 - 36.0 g/dL Final    RDW 06/24/2024 18.7 (H)  11.5 - 17.0 % Final    Platelet 06/24/2024 351  130 - 400 x10(3)/mcL Final    MPV 06/24/2024 10.9 (H)  7.4 - 10.4 fL Final    Neut % 06/24/2024 50.4  % Final    Lymph % 06/24/2024 43.3  % Final    Mono % 06/24/2024 5.4  % Final    Eos % 06/24/2024 0.6  % Final    Basophil % 06/24/2024 0.3  % Final    Lymph # 06/24/2024 2.96  0.6 - 4.6 x10(3)/mcL Final    Neut # 06/24/2024 3.44  2.1 - " 9.2 x10(3)/mcL Final    Mono # 06/24/2024 0.37  0.1 - 1.3 x10(3)/mcL Final    Eos # 06/24/2024 0.04  0 - 0.9 x10(3)/mcL Final    Baso # 06/24/2024 0.02  <=0.2 x10(3)/mcL Final    IG# 06/24/2024 0.00  0 - 0.04 x10(3)/mcL Final    IG% 06/24/2024 0.0  % Final         Assessment:       1. Malignant neoplasm of upper-outer quadrant of right breast in female, estrogen receptor positive    2. Iron deficiency anemia secondary to inadequate dietary iron intake      Plan:       Patient with right breast cancer s/p bilateral mastectomies done 3/15/24. Multifocal breast cancer, mixed ductal and lobular, 26mm and 3mm, with +PNI, and 2/11 lymph nodes with ITC, ER and SD strongly positive and Her2 negative with Ki67 23%.  Per [facility], recommended to have Oncotype, and score returned low risk 14.   No radiation recommended due to only isolated tumor cells in LN.  Patient is post-menopausal, JENNIFER age 40 for fibroids and endometriosis, and was on +HRT when she was diagnosed as post-menopausal since age 44. HRT stopped at diagnosis.     Started Femara on 4/26/24. Tolerating well.   CBC today is good, anemia resolved. CMP pending.   Baseline DEXA on 6/17/24 with normal bone density.   Continue Calcium and Vitamin D3.   Refer to survivorship.   Begin surveillance visits every 3 months.     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 There are no clear guidelines for screening but suggested to start screening colonoscopy age 38 and continue every 5 years. She has not had one in several years. She was referred back to GI but [facility] from [facility].     She has known MARC. She is on oral iron, with no response. She has required IV iron in the past. She has a h/o gastric sleeve surgery in 2020, so suspect this is the culprit.  Given InFeD 1500mg x 1 dose on 5/1/24.   CBC today with resolved anemia.     All questions answered at this  time.      QUIANA ChengPC

## 2024-06-24 ENCOUNTER — LAB VISIT (OUTPATIENT)
Dept: LAB | Facility: HOSPITAL | Age: 54
End: 2024-06-24
Attending: INTERNAL MEDICINE
Payer: OTHER GOVERNMENT

## 2024-06-24 ENCOUNTER — OFFICE VISIT (OUTPATIENT)
Dept: HEMATOLOGY/ONCOLOGY | Facility: CLINIC | Age: 54
End: 2024-06-24
Payer: OTHER GOVERNMENT

## 2024-06-24 VITALS
TEMPERATURE: 98 F | DIASTOLIC BLOOD PRESSURE: 84 MMHG | WEIGHT: 180.69 LBS | RESPIRATION RATE: 14 BRPM | OXYGEN SATURATION: 96 % | HEIGHT: 64 IN | BODY MASS INDEX: 30.85 KG/M2 | SYSTOLIC BLOOD PRESSURE: 124 MMHG | HEART RATE: 87 BPM

## 2024-06-24 DIAGNOSIS — D50.8 IRON DEFICIENCY ANEMIA SECONDARY TO INADEQUATE DIETARY IRON INTAKE: ICD-10-CM

## 2024-06-24 DIAGNOSIS — C50.411 MALIGNANT NEOPLASM OF UPPER-OUTER QUADRANT OF RIGHT BREAST IN FEMALE, ESTROGEN RECEPTOR POSITIVE: Primary | ICD-10-CM

## 2024-06-24 DIAGNOSIS — Z17.0 MALIGNANT NEOPLASM OF UPPER-OUTER QUADRANT OF RIGHT BREAST IN FEMALE, ESTROGEN RECEPTOR POSITIVE: Primary | ICD-10-CM

## 2024-06-24 DIAGNOSIS — Z17.0 MALIGNANT NEOPLASM OF UPPER-OUTER QUADRANT OF RIGHT BREAST IN FEMALE, ESTROGEN RECEPTOR POSITIVE: ICD-10-CM

## 2024-06-24 DIAGNOSIS — C50.411 MALIGNANT NEOPLASM OF UPPER-OUTER QUADRANT OF RIGHT BREAST IN FEMALE, ESTROGEN RECEPTOR POSITIVE: ICD-10-CM

## 2024-06-24 LAB
ALBUMIN SERPL-MCNC: 3.8 G/DL (ref 3.5–5)
ALBUMIN/GLOB SERPL: 1.1 RATIO (ref 1.1–2)
ALP SERPL-CCNC: 101 UNIT/L (ref 40–150)
ALT SERPL-CCNC: 12 UNIT/L (ref 0–55)
ANION GAP SERPL CALC-SCNC: 9 MEQ/L
AST SERPL-CCNC: 18 UNIT/L (ref 5–34)
BASOPHILS # BLD AUTO: 0.02 X10(3)/MCL
BASOPHILS NFR BLD AUTO: 0.3 %
BILIRUB SERPL-MCNC: 0.2 MG/DL
BUN SERPL-MCNC: 9.7 MG/DL (ref 9.8–20.1)
CALCIUM SERPL-MCNC: 9.8 MG/DL (ref 8.4–10.2)
CHLORIDE SERPL-SCNC: 104 MMOL/L (ref 98–107)
CO2 SERPL-SCNC: 27 MMOL/L (ref 22–29)
CREAT SERPL-MCNC: 0.74 MG/DL (ref 0.55–1.02)
CREAT/UREA NIT SERPL: 13
EOSINOPHIL # BLD AUTO: 0.04 X10(3)/MCL (ref 0–0.9)
EOSINOPHIL NFR BLD AUTO: 0.6 %
ERYTHROCYTE [DISTWIDTH] IN BLOOD BY AUTOMATED COUNT: 18.7 % (ref 11.5–17)
GFR SERPLBLD CREATININE-BSD FMLA CKD-EPI: >60 ML/MIN/1.73/M2
GLOBULIN SER-MCNC: 3.5 GM/DL (ref 2.4–3.5)
GLUCOSE SERPL-MCNC: 82 MG/DL (ref 74–100)
HCT VFR BLD AUTO: 40.3 % (ref 37–47)
HGB BLD-MCNC: 13.2 G/DL (ref 12–16)
IMM GRANULOCYTES # BLD AUTO: 0 X10(3)/MCL (ref 0–0.04)
IMM GRANULOCYTES NFR BLD AUTO: 0 %
LYMPHOCYTES # BLD AUTO: 2.96 X10(3)/MCL (ref 0.6–4.6)
LYMPHOCYTES NFR BLD AUTO: 43.3 %
MCH RBC QN AUTO: 28.4 PG (ref 27–31)
MCHC RBC AUTO-ENTMCNC: 32.8 G/DL (ref 33–36)
MCV RBC AUTO: 86.9 FL (ref 80–94)
MONOCYTES # BLD AUTO: 0.37 X10(3)/MCL (ref 0.1–1.3)
MONOCYTES NFR BLD AUTO: 5.4 %
NEUTROPHILS # BLD AUTO: 3.44 X10(3)/MCL (ref 2.1–9.2)
NEUTROPHILS NFR BLD AUTO: 50.4 %
PLATELET # BLD AUTO: 351 X10(3)/MCL (ref 130–400)
PMV BLD AUTO: 10.9 FL (ref 7.4–10.4)
POTASSIUM SERPL-SCNC: 4.5 MMOL/L (ref 3.5–5.1)
PROT SERPL-MCNC: 7.3 GM/DL (ref 6.4–8.3)
RBC # BLD AUTO: 4.64 X10(6)/MCL (ref 4.2–5.4)
SODIUM SERPL-SCNC: 140 MMOL/L (ref 136–145)
WBC # BLD AUTO: 6.83 X10(3)/MCL (ref 4.5–11.5)

## 2024-06-24 PROCEDURE — 80053 COMPREHEN METABOLIC PANEL: CPT

## 2024-06-24 PROCEDURE — 36415 COLL VENOUS BLD VENIPUNCTURE: CPT

## 2024-06-24 PROCEDURE — 85025 COMPLETE CBC W/AUTO DIFF WBC: CPT

## 2024-06-24 PROCEDURE — 99214 OFFICE O/P EST MOD 30 MIN: CPT | Mod: S$PBB,,, | Performed by: NURSE PRACTITIONER

## 2024-06-24 PROCEDURE — 99999 PR PBB SHADOW E&M-EST. PATIENT-LVL V: CPT | Mod: PBBFAC,,, | Performed by: NURSE PRACTITIONER

## 2024-06-24 PROCEDURE — 99215 OFFICE O/P EST HI 40 MIN: CPT | Mod: PBBFAC | Performed by: NURSE PRACTITIONER

## 2024-06-24 RX ORDER — IPRATROPIUM BROMIDE 42 UG/1
SPRAY, METERED NASAL
COMMUNITY

## 2024-06-25 NOTE — PROGRESS NOTES
[facility] Breast Surg  Breast Surgical Oncology  Follow-Up Patient Office Visit       Referring Provider: No ref. 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     Interval History:  2024 - Brenden Mccauley presents for a 3 month follow up since her surgery. She has been doing well. Started Femara and is overall tolerating well other than hot flashes. She has reconstruction next month.    HPI:  Brenden Mccauley is a 53 y.o. female who presents on 2024 for evaluation of newly diagnosed right  breast cancer. She has a history of reduction mammoplasty in  as well as previous right breast biopsy.     A detailed patient history was obtained and reviewed. She currently denies any breast issues including rashes, redness, pain, swelling, nipple discharge, or new lumps/masses.    MG breast density: Category C (heterogeneously dense)     Imagin2023 BL SC MG at [facility]= which revealed on R mg, there is an irregular mass with associated calcifications at 9 o'clock posterior depth.  There are calcifications in the right breast lower inner quadrant, middle depth. There is an asymmetry in the medial right breast, anterior depth.     On L MG, there are calcifications noted at 3 o'clock, posterior depth, in the medial left breast, anterior to middle depth.  There is an asymmetry with possible associated architectural distortion in the medial left breast, middle  depth. BIRADS-0; additional imaging needed    2. 1/3/2024 BL DG MG/ BL US BREAST COMPLETE at [facility]-        Right MG- at 10 o'clock middle/posterior depth, there is a persistent 1.4 cm irregular mass with spiculated margins and associated architectural distortion. There are punctate and amorphous calcifications associated with this mass. There are heterogeneous, punctate, and amorphous calcifications in a diffuse distribution throughout the right breast.  The most concentrated region of calcifications is located in the central/superior medial right breast anterior to middle depths. The asymmetry in the medial breast, anterior depth, effaces into normal-appearing fibroglandular tissue.   There are diffuse post surgical changes of the right breast related to previous reduction mammoplasty.          Right US-  At 10 o'clock 4 cm FN, there is a 1.4 cm x 0.9 cm x 1.4 cm irregular hypoechoic mass with spiculated and indistinct margins, posterior shadowing, associated architectural distortion, and associated echogenic calcifications. Benign simple cysts seen at 11 o'clock and 1 o'clock subareolar. The right axilla demonstrates no significant right axillary adenopathy.        Left MG- there are heterogeneous, punctate, and amorphous calcifications in a diffuse distribution throughout the left breast. The most concentrated groupings of calcifications are located in the upper inner and upper outer quadrants of the left breast middle/posterior depth. The asymmetry with possible associated architectural distortion seen in the medial left breast effaced into normal-appearing fibroglandular tissue on spot compression tomosynthesis images.        Left US- there are two morphologically similar parallel oval avascular hypoechoic masses with circumscribed margins  measuring 0.9 cm x 0.2 cm x 0.5 cm; at 4 o'clock 5 cm FN and 1.0 cm x 0.3 cm x 0.8 cm; at 4 o'clock 3 cm from the nipple.  These masses are without significant change  mammographically dating back at least to the 2021 exam, and are also likely without significant change dating back to the 2017 exam allowing for differences in technique and interval reduction mammoplasty.  No suspicious sonographic abnormality is evident in the left breast.. Left axilla demonstrates no significant left axillary adenopathy. BIRADS-4 suspicious;biopsy recommended     3. 2/8/2024 BL Breast MRI -   MRI Breast w/wo Contrast, w/CAD, Bilateral 02/08/2024  IMPRESSION: KNOWN BIOPSY PROVEN MALIGNANCY  1) Right breast upper outer quadrant, lower outer quadrant, and central region posterior to middle depths irregular heterogeneously enhancing spiculated mass with regional clumped non mass enhancement extending away superiorly, inferiorly, and anteriorly measures 5.7 x 2.6 x 7.2 cm ([id number]) in total extent and correlates with biopsy-proven malignancy.  BI-RADS 6: Known biopsy-proven malignancy.    2) No abnormal enhancement associated with the recent benign stereotactic guided core needle biopsy sites for calcifications in both breasts (right breast 3:00 axis middle depth and left breast upper inner quadrant middle depth).  There are multiple hematomas in the right breast upper inner and lower inner quadrants in the anterior depth related to the 3:00 axis middle depth core needle biopsy.  BI-RADS 2: Benign.    3) No MRI evidence of malignancy in the left breast.      Pathology:  1/25/2024 Ultrasound-guided Core Needle Biopsy Right Breast Mass 10 o'clock 4 cm FN-        - Invasive mammary carcinoma with mixed ductal and lobular features, grade 2 (measuring 9 mm) with associated calcifications          ER 86%          AK 80%          HER2 aden 1+          Ki67 23%    2. 1/25/2024 Stereotactic guided Core Needle Biopsy Right Breast 3 o'clock Calcifications-       -Stromal fibrosis, cystic dilation of ducts, sclerosing adenosis and microcalcifications         -No evidence of malignancy    3. 1/25/2024 Stereotactic  guided Core Needle Biopsy Left Breast UIQ Middle Depth Calcifications       - Stromal fibrosis, cystic dilation of ducts, apocrine metaplasia, sclerosing adenosis, fibroadenomatoid change, microcalcifications, and mild ductal epithelial hyperplasia of the usual type        -No evidence of malignancy    4. 3/15/2024 Bilateral Mastectomy with Right [place] Lymph Node Biopsy which revealed-   Right Breast- multifocal invasive carcinoma with mixed ductal and lobular features (measuring 26 mm, 3.0 mm); fibroadenoma; calcifications within invasive carcinoma within benign lobules; perineural invasion by carcinoma. All margins negative for invasive carcinoma.  Eleven sentinel lymph nodes all negative except 2 with isolated tumor cells.    Left Breast- benign breast tissue with fibrocystic changes; calcifications within benign lobules        OB/GYN History:  Age at Menarche Onset: 10  Menopausal Status: postmenopausal, LMP: No LMP recorded. Patient has had a hysterectomy.  Hysterectomy/Oophorectomy: hysterectomy without BSO, at age 40  Hormonal birth control (duration): Yes OCP (estrogen/progesterone).   Pregnancy History:   Age at first live birth: 18  Hormone Replacement Therapy: Yes, Oral Estrogen. Started age 44, discontinued once diagnosed with breast cancer  Patient did not breast feed.  Patient admits to to previous breast biopsy- Right Breast 2017 Benign    Other Relevant History:  Prior thoracic RT: none  Genetic testing: No  Ashkenazi Mormon descent: No    Family History:  Family History   Problem Relation Name Age of Onset    Diabetes type I Mother Paul Hamm     Multiple myeloma Mother Paul Hamm 55    Diabetes Mother Paul Hamm     Hypertension Mother Paul Hamm     Kidney disease Mother Paul Hamm     Alzheimer's disease Father              No Known Problems Sister      No Known Problems Brother      Pancreatic cancer Maternal Grandmother  70    Breast cancer Paternal  Grandmother Claudette Hamm 55    Liver cancer Paternal Grandmother Claudette Hamm 55    Breast cancer Paternal Aunt Lesley Fulton 62        Past History:  Past Medical History:   Diagnosis Date    Abnormal uterine bleeding 2006    Anemia     Anxiety     Cancer     right breast    Depression 1992    Diabetes mellitus     Endometriosis of uterus     Fibroid     Heart attack     Hyperlipidemia     Hypertension 10/4/2022    Menopause 2017    Obesity, unspecified     BMI 32.10    Pregnancy     Miscarriage    Sleep apnea     no cpap        Past Surgical History:   Procedure Laterality Date    ABDOMINAL SURGERY      APPENDECTOMY      BREAST SURGERY Bilateral     breast reduction    CERVICAL BIOPSY  W/ LOOP ELECTRODE EXCISION      CERVIX LESION DESTRUCTION       SECTION      COSMETIC SURGERY      Breast reduction    DILATION AND CURETTAGE OF UTERUS      ENDOMETRIAL ABLATION      gastric sleeve      HYSTERECTOMY      HYSTEROSCOPY      INSERTION OF BREAST TISSUE EXPANDER Bilateral 3/15/2024    Procedure: INSERTION, TISSUE EXPANDER, BREAST (bilateral breast recon with placement of tissue expanders and acellular dermal matrix, use of intraoperative fluoroscein angiography);  Surgeon: Ubaldo David MD;  Location: [facility] OR;  Service: Plastics;  Laterality: Bilateral;  300mL filled for each tissue expander    JOINT REPLACEMENT      bilateral knees    LUMBAR LAMINECTOMY N/A 2023    Procedure: LAMINECTOMY, SPINE, LUMBAR L4 SNS:SSEP/EMG;  Surgeon: Kamlesh Gomez MD;  Location: [facility] OR;  Service: Orthopedics;  Laterality: N/A;    LUMBAR LAMINECTOMY N/A 2023    Procedure: LAMINECTOMY, SPINE, LUMBAR L4;  Surgeon: Kamlesh Gomez MD;  Location: [facility] OR [address] [place] [address];  Service: Neurosurgery;  Laterality: N/A;    SENTINEL LYMPH NODE BIOPSY Right 3/15/2024    Procedure: BIOPSY, LYMPH NODE, SENTINEL - RIGHT MagTrace Injected in office SentiMag needed No NUC MED;  Surgeon: Christina Romero  MD;  Location: [facility];  Service: General;  Laterality: Right;  MagTrace Injected in office  SentiMag needed  No NUC MED    SIMPLE MASTECTOMY Bilateral 3/15/2024    Procedure: MASTECTOMY, SIMPLE - BILATERAL;  Surgeon: Christina Romero MD;  Location: [facility];  Service: General;  Laterality: Bilateral;    SPINE SURGERY      TONSILLECTOMY      TOTAL KNEE ARTHROPLASTY Bilateral     TUBAL LIGATION      WISDOM TOOTH EXTRACTION          Social History     Socioeconomic History    Marital status: Single   Tobacco Use    Smoking status: Every Day     Average packs/day: 0.5 packs/day for 5.0 years (2.5 ttl pk-yrs)     Types: Cigarettes     Start date: 2018     Last attempt to quit: 2018     Years since quittin.5    Smokeless tobacco: Never    Tobacco comments:     1 pk every 3 days    Substance and Sexual Activity    Alcohol use: Not Currently     Alcohol/week: 1.0 standard drink of alcohol     Types: 1 Glasses of wine per week    Drug use: Never    Sexual activity: Not Currently     Partners: Male     Birth control/protection: Abstinence     Social Determinants of Health     Financial Resource Strain: Low Risk  (2024)    [facility]    Overall Financial Resource Strain (CARDIA)     Difficulty of Paying Living Expenses: Not hard at all   Food Insecurity: No Food Insecurity (2024)    [facility] from [facility] Subsidi[facility] and Affiliates    Hunger Vital Sign     Worried About Running Out of Food in the Last Year: Never true     Ran Out of Food in the Last Year: Never true   Transportation Needs: No Transportation Needs (2024)    [facility]    PRAPARE - Transportation     Lack of Transportation (Medical): No     Lack of Transportation (Non-Medical): No   Physical  Activity: Inactive (4/22/2024)    Received from [facility] and Its Subsidiaries and Affiliates    Exercise Vital Sign     Days of Exercise per Week: 0 days     Minutes of Exercise per Session: 0 min   Stress: Stress Concern Present (4/22/2024)    Received from [facility] and Its Subsidiaries and Affiliates    [employer] - Occupational Stress Questionnaire     Feeling of Stress : Rather much        Body mass index is 31 kg/m².     Allergy/Medications:   Review of patient's allergies indicates:   Allergen Reactions    Latex, natural rubber Hives, Anxiety, Itching, Palpitations and Swelling     Other Reaction(s): Unknown    Tomato (solanum lycopersicum) Hives and Swelling          Current Outpatient Medications:     ALPRAZolam (XANAX) 0.25 MG tablet, Take by mouth., Disp: , Rfl:     aspirin (ECOTRIN) 81 MG EC tablet, Take 81 mg by mouth once daily., Disp: , Rfl:     azelastine (ASTELIN) 137 mcg (0.1 %) nasal spray, SMARTSIG:Both Nares, Disp: , Rfl:     blood sugar diagnostic (ONETOUCH ULTRA TEST) Strp, CHECK SUGARS DAILY, Disp: , Rfl:     blood sugar diagnostic (TRUE METRIX GLUCOSE TEST STRIP) Strp, CHECK SUGARS DAILY, Disp: , Rfl:     blood-glucose meter kit, Check sugars daily. For T2DM without insulin (E11.9), Disp: , Rfl:     busPIRone (BUSPAR) 30 MG Tab, Take 30 mg by mouth 2 (two) times a day., Disp: , Rfl:     carvediloL (COREG) 3.125 MG tablet, Take 3.125 mg by mouth 2 (two) times daily., Disp: , Rfl:     celecoxib (CELEBREX) 200 MG capsule, , Disp: , Rfl:     FEROSUL 325 mg (65 mg iron) Tab tablet, Take by mouth every other day., Disp: , Rfl:     fexofenadine (ALLEGRA) 180 MG tablet, Take 1 tablet by mouth every morning., Disp: , Rfl:     fluoride, sodium, (PREVIDENT 5000 PLUS) 1.1 % Crea, , Disp: , Rfl:     ipratropium (ATROVENT) 42 mcg (0.06 %) nasal spray, by Each Nostril route., Disp: , Rfl:      lamoTRIgine (LAMICTAL) 150 MG Tab, TAKE ONE TABLET BY MOUTH AT BEDTIME SEIZURES FOR MOOD STABILIZATION, Disp: , Rfl:     letrozole (FEMARA) 2.5 mg Tab, Take 1 tablet (2.5 mg total) by mouth once daily., Disp: 30 tablet, Rfl: 3    metFORMIN (GLUCOPHAGE) 500 MG tablet, Take 500 mg by mouth 2 (two) times daily with meals., Disp: , Rfl:     montelukast (SINGULAIR) 10 mg tablet, Take 10 mg by mouth once daily., Disp: , Rfl:     pantoprazole (PROTONIX) 40 MG tablet, TAKE ONE TABLET BY MOUTH EVERY DAY FOR ACID REFLUX, Disp: , Rfl:     rosuvastatin (CRESTOR) 10 MG tablet, Take 10 mg by mouth once daily., Disp: , Rfl:     sertraline 200 mg Cap, Take 200 mg by mouth once daily., Disp: , Rfl:     SODIUM FLUORIDE 5000 DRY MOUTH 1.1 % Pste, , Disp: , Rfl:     traZODone (DESYREL) 100 MG tablet, Take 100 mg by mouth every evening., Disp: , Rfl:     vitamin D (VITAMIN D3) 1000 units Tab, Take 1,000 Units by mouth once daily., Disp: , Rfl:     meloxicam (MOBIC) 15 MG tablet, Take 15 mg by mouth once daily. (Patient not taking: Reported on 6/24/2024), Disp: , Rfl:     ondansetron (ZOFRAN-ODT) 4 MG TbDL, Take by mouth. (Patient not taking: Reported on 6/24/2024), Disp: , Rfl:        Review of Systems:  Review of Systems   Constitutional:  Negative for chills, fever and weight loss.   HENT:  Negative for sore throat.    Eyes:  Negative for blurred vision and double vision.   Respiratory:  Negative for cough and shortness of breath.    Cardiovascular:  Negative for chest pain, palpitations and leg swelling.   Gastrointestinal:  Positive for constipation. Negative for abdominal pain, diarrhea, heartburn, nausea and vomiting.        Takes meds 1 / week   Genitourinary:  Negative for dysuria, flank pain, frequency, hematuria and urgency.   Musculoskeletal:  Negative for back pain, joint pain and myalgias.   Neurological:  Positive for headaches. Negative for dizziness.        Been having for years   Endo/Heme/Allergies:  Does not  "bruise/bleed easily.   Psychiatric/Behavioral:  Negative for depression. The patient is not nervous/anxious.            Objective:     Vitals:  Blood pressure 119/81, pulse 86, temperature 98.1 °F (36.7 °C), temperature source Oral, resp. rate 20, height 5' 4" (1.626 m), weight 81.9 kg (180 lb 9.6 oz), SpO2 98%.      Physical Exam:  General: The patient is awake, alert and oriented times three. The patient is well nourished and in no acute distress.  Neck: There is no evidence of palpable cervical, supraclavicular or axillary adenopathy. The neck is supple. The thyroid is not enlarged.  Musculoskeletal: The patient has a normal range of motion of her bilateral upper extremities.  Chest: Examination of the chest wall fails to reveal any obvious abnormalities. Nonlabored breathing, symmetric expansion.  Breast:  Right:   Examination of right reconstructed breast fails to reveal any dominant masses or areas of significant focal nodularity. Tissue expander palpable.  The nipple is surgically absent. There is no skin dimpling with movement of the pectoralis. There are no significant skin changes overlying the breast.   Left:  Examination of the left reconstructed breast fails to reveal any dominant masses or areas of significant focal nodularity. Tissue expander palpable.  The nipple is surgically absent. There is no skin dimpling with movement of the pectoralis. There are no significant skin changes overlying the breast.  Abdomen: The abdomen is soft, flat, nontender and nondistended.  Integumentary: no rashes or skin lesions present  Neurologic: cranial nerves intact, no signs of peripheral neurological deficit, motor/sensory function intact    Assessment and Plan:     Encounter Diagnoses   Name Primary?    Malignant neoplasm of upper-outer quadrant of right breast in female, estrogen receptor positive Yes    History of bilateral mastectomy           6/26/2024 - Brenden Mccauley presents for a 3 month " follow up since her surgery. She has been doing well. Started Femara and is overall tolerating well other than hot flashes. She has reconstruction next month.     Plan:       RTC in 6 months for CBE/surveillance.    BLM mutation - She was referred back to GI but [facility] She [facility] her doctor soon and will request referral.    Continue adjuvant therapy and follow up with oncology.        All of her questions were answered. She was advised to call if she develops any questions or concerns.    Sayda Lozada PA-C           Total time on the date of the visit ranged from 30-39 mins (86402). Total time includes both face-to-face and non-face-to-face time personally spent by myself on the day of the visit.    Non-face-to-face time included:  _X_ preparing to see the patient such as reviewing the patient record  __ obtaining and reviewing separately obtained history  _X_ independently interpreting results  _X_ documenting clinical information in electronic health record.    Face-to-face time included:  _X_ performing an appropriate history and examination  _X_ communicating results to the patient  _X_ counseling and educating the patient  __ ordering appropriate medications  _x_ ordering appropriate tests  _X_ ordering appropriate procedures (including follow-up)  _X_ answering any questions the patient had    Total Time spent on date of visit: 30 minutes

## 2024-06-26 ENCOUNTER — OFFICE VISIT (OUTPATIENT)
Dept: SURGERY | Facility: CLINIC | Age: 54
End: 2024-06-26
Payer: OTHER GOVERNMENT

## 2024-06-26 VITALS
DIASTOLIC BLOOD PRESSURE: 81 MMHG | WEIGHT: 180.63 LBS | BODY MASS INDEX: 30.84 KG/M2 | SYSTOLIC BLOOD PRESSURE: 119 MMHG | HEART RATE: 86 BPM | RESPIRATION RATE: 20 BRPM | TEMPERATURE: 98 F | HEIGHT: 64 IN | OXYGEN SATURATION: 98 %

## 2024-06-26 DIAGNOSIS — Z90.13 HISTORY OF BILATERAL MASTECTOMY: ICD-10-CM

## 2024-06-26 DIAGNOSIS — C50.411 MALIGNANT NEOPLASM OF UPPER-OUTER QUADRANT OF RIGHT BREAST IN FEMALE, ESTROGEN RECEPTOR POSITIVE: Primary | ICD-10-CM

## 2024-06-26 DIAGNOSIS — Z17.0 MALIGNANT NEOPLASM OF UPPER-OUTER QUADRANT OF RIGHT BREAST IN FEMALE, ESTROGEN RECEPTOR POSITIVE: Primary | ICD-10-CM

## 2024-06-26 PROCEDURE — 99214 OFFICE O/P EST MOD 30 MIN: CPT | Mod: S$PBB,,, | Performed by: PHYSICIAN ASSISTANT

## 2024-06-26 PROCEDURE — 99999 PR PBB SHADOW E&M-EST. PATIENT-LVL V: CPT | Mod: PBBFAC,,, | Performed by: PHYSICIAN ASSISTANT

## 2024-06-26 PROCEDURE — 99215 OFFICE O/P EST HI 40 MIN: CPT | Mod: PBBFAC | Performed by: PHYSICIAN ASSISTANT

## 2024-06-26 RX ORDER — PANTOPRAZOLE SODIUM 40 MG/1
TABLET, DELAYED RELEASE ORAL
COMMUNITY
Start: 2024-05-17 | End: 2025-01-24

## 2024-06-26 RX ORDER — LAMOTRIGINE 150 MG/1
TABLET ORAL
COMMUNITY
Start: 2024-05-17 | End: 2024-10-26

## 2024-06-26 RX ORDER — SODIUM FLUORIDE 6.1 MG/ML
PASTE, DENTIFRICE DENTAL
COMMUNITY
Start: 2024-06-12

## 2024-06-26 RX ORDER — SODIUM FLUORIDE 5 MG/G
PASTE, DENTIFRICE DENTAL
COMMUNITY
Start: 2024-06-03

## 2024-06-27 DIAGNOSIS — M54.2 CERVICALGIA: ICD-10-CM

## 2024-06-27 DIAGNOSIS — M54.16 LUMBAR RADICULOPATHY, CHRONIC: Primary | ICD-10-CM

## 2024-06-28 ENCOUNTER — TELEPHONE (OUTPATIENT)
Dept: ORTHOPEDICS | Facility: CLINIC | Age: 54
End: 2024-06-28
Payer: OTHER GOVERNMENT

## 2024-06-28 NOTE — TELEPHONE ENCOUNTER
Called patient to discuss rescheduling her MRI, patient states she will contact us when she is fully recovered from breast surgery to discuss moving forward with MRI.    ----- Message from Leandra Levi LPN sent at 6/27/2024  3:50 PM CDT -----    ----- Message -----  From: Shaina Pavon PA-C  Sent: 6/27/2024  11:38 AM CDT  To: Leandra Levi LPN    I jsut put in new orders.  Will you please put in the comments that the expanders will be removed 7/13?  ----- Message -----  From: Leandra Levi LPN  Sent: 6/27/2024  11:32 AM CDT  To: Shaina Pavon PA-C    I tried to schedule from active request but it is not letting me. When I open up order from the active request it states denied.  ----- Message -----  From: Shaina Pavon PA-C  Sent: 6/27/2024  10:39 AM CDT  To: Leandra Levi LPN    You should be able to from the active requests now.  ----- Message -----  From: Leandra Levi LPN  Sent: 6/27/2024   9:56 AM CDT  To: Shaina Pavon PA-C    It's not letting me schedule because orders were denied. You may need to put in new orders.  ----- Message -----  From: Shaina Pavon PA-C  Sent: 6/27/2024   6:42 AM CDT  To: Leandra Levi LPN    Will you please schedule them after her surgery?  ----- Message -----  From: Leandra Levi LPN  Sent: 6/26/2024   3:00 PM CDT  To: Shaina Pavon PA-C    I called patient and expanders will not be removed until her surgery 7/13.  ----- Message -----  From: Shaina Pavon PA-C  Sent: 6/26/2024   2:07 PM CDT  To: Leandra Levi LPN    Oh I see, will you please let the patient know that she can't have an MRI until her expanders are removed and ask her if they plan to remove them soon and we can reschedule after that?  ----- Message -----  From: Leandra Levi LPN  Sent: 6/26/2024   1:55 PM CDT  To: Shaina Pavon PA-C    See below. Please advise  ----- Message -----  From: Nany Denins  Sent: 6/26/2024   1:40 PM  CDT  To: Leandra Levi LPN    Hi Ms Mobley. This is in ref to a MRI that Dr Gomez ordered for the patient that was denied. They are saying she is not compatible as well due to extenders in her breast and may not be compatible until they are removed. They wanted to inform Dr Gomez and get a call back on the denial.  ----- Message -----  From: Leandra Levi LPN  Sent: 6/26/2024   1:24 PM CDT  To: Nany Dennis    You will need to call Dr. David's office, the patient's plastic surgeon.    Leandra Levi LPN, [facility]  Clinical Nurse to Dr. Saul Peck  [facility]  [phone] phone  [phone] fax  ----- Message -----  From: Sayda Lozada PA  Sent: 6/26/2024  12:04 PM CDT  To: Leandra Levi LPN    Hi! Actually, this is a question for plastics (Dr. David's office). I would advise her to call their office!  ----- Message -----  From: Leandra Levi LPN  Sent: 6/26/2024  11:51 AM CDT  To: ADRIANA Cortez    I think this message is for your department. Please let me know if you have any questions.  ----- Message -----  From: Shaina Pavon PA-C  Sent: 6/26/2024  11:44 AM CDT  To: Leandra Levi LPN    I think this was meant for plastic surgery who did her breast surgery  ----- Message -----  From: Leandra Levi LPN  Sent: 6/26/2024  11:24 AM CDT  To: Shaina Pavon PA-C    Please advise  ----- Message -----  From: Nany Dennis  Sent: 6/26/2024  10:57 AM CDT  To: Patricia Whitlock Staff    Name of Who is Calling: GALDINO POST [[id number]] Mindy ( [facility] )       What is the request in detail:  question needs to be answered if she has tissue expanders in place. (Those are not mri compatible) Please advise       Can the clinic reply by [facility]:       What Number to Call Back if not in [facility]:  [phone] option 5

## 2024-07-10 ENCOUNTER — OFFICE VISIT (OUTPATIENT)
Dept: HEMATOLOGY/ONCOLOGY | Facility: CLINIC | Age: 54
End: 2024-07-10
Payer: OTHER GOVERNMENT

## 2024-07-10 ENCOUNTER — CLINICAL SUPPORT (OUTPATIENT)
Dept: HEMATOLOGY/ONCOLOGY | Facility: CLINIC | Age: 54
End: 2024-07-10
Payer: OTHER GOVERNMENT

## 2024-07-10 DIAGNOSIS — C50.411 MALIGNANT NEOPLASM OF UPPER-OUTER QUADRANT OF RIGHT BREAST IN FEMALE, ESTROGEN RECEPTOR POSITIVE: Primary | ICD-10-CM

## 2024-07-10 DIAGNOSIS — Z17.0 MALIGNANT NEOPLASM OF UPPER-OUTER QUADRANT OF RIGHT BREAST IN FEMALE, ESTROGEN RECEPTOR POSITIVE: Primary | ICD-10-CM

## 2024-07-10 PROCEDURE — 99215 OFFICE O/P EST HI 40 MIN: CPT | Mod: 95,,,

## 2024-07-10 PROCEDURE — 99499 UNLISTED E&M SERVICE: CPT | Mod: 95,,, | Performed by: SOCIAL WORKER

## 2024-07-10 NOTE — PROGRESS NOTES
I spoke with Brenden over the phone for her patient education appointment. I reviewed my role and discussed barriers to treatment and her emotional wellbeing. She requested an appointment with me for therapy. We scheduled for next week. I

## 2024-07-10 NOTE — PROGRESS NOTES
PATIENT: Brenden Mccauley  MRN: [id number]  DATE: 7/10/2024  Chief Complaint: Survivorship Clinic    Established Patient - Video Telehealth Visit      The patient location is: Home  The chief complaint leading to consultation is: Therapy Education  Visit type: Virtual visit with Video Visual   Total time spent with patient: 60 mins        Each patient to whom I provide medical services by telemedicine is:  (1) informed of the relationship between the physician and patient and the respective role of any other health care provider with respect to management of the patient; and (2) notified that they may decline to receive medical services by telemedicine and may withdraw from such care at any time. Patient verbally consented to receive this Video service.     This service was not originating from a related E/M service provided within the previous 7 days nor will  to an E/M service or procedure within the next 24 hours or my soonest available appointment.  Prevailing standard of care was able to be met in this video visit.       Subjective:   Oncology History: Ms. Mccauley is a 54 y.o. female  with history of Right Breast Cancer Stage IA (T2(m2) N0(I+)M0)--Diagnosed 1/25/24 who presents for survivorship clinic visit. She is doing well. Patient stated she started Femara on 4/26/24 and is tolerating well with the exception of hot flashes. Otherwise no side effects. Baseline DEXA with normal bone density was completed on 6/17/24. Patient states her breast reconstruction is scheduled for 7/23/24 with Dr. David. Denies fever, chills, SOB, N/V/D, constipation, recent infection, chest pain or unexplained bleeding or bruising.      DEXA:  06/17/24--AP spine 0.6, left femoral neck -0.6, left total -0.2, right femoral neck -0.4, right total -0.1. Normal bone density.      Treatment history:  Bilateral mastectomies with right SLN biopsies with tissue expander reconstruction done 3/15/24.   InFeD 1500mg x  1 dose on 24.      Current treatment plan:   Adjuvant Femara started on 24.     PCP: Jani Call DO  GYN:    Last Colonoscopy:  Last WWE/Pelvic Exam:    CVD Risk Assessment:  The 10-year ASCVD risk score (King JACOBS, et al., 2019) is: 14%    Values used to calculate the score:      Age: 54 years      Sex: Female      Is Non- : Yes      Diabetic: No      Tobacco smoker: Yes      Systolic Blood Pressure: 141 mmHg      Is BP treated: Yes      HDL Cholesterol: 58 mg/dL      Total Cholesterol: 211 mg/dL      Past Medical History:   Past Medical History:   Diagnosis Date    Abnormal uterine bleeding 2006    Anemia     Anxiety     Cancer     right breast    Depression 1992    Diabetes mellitus     Endometriosis of uterus     Fibroid     Heart attack     Hyperlipidemia     Hypertension 10/4/2022    Menopause 2017    Obesity, unspecified     BMI 32.10    Pregnancy     Miscarriage    Sleep apnea     no cpap       Past Surgical History:   Past Surgical History:   Procedure Laterality Date    ABDOMINAL SURGERY      APPENDECTOMY      BREAST SURGERY Bilateral     breast reduction    CERVICAL BIOPSY  W/ LOOP ELECTRODE EXCISION      CERVIX LESION DESTRUCTION       SECTION      COSMETIC SURGERY      Breast reduction    DILATION AND CURETTAGE OF UTERUS      ENDOMETRIAL ABLATION      gastric sleeve      HYSTERECTOMY      HYSTEROSCOPY      INSERTION OF BREAST TISSUE EXPANDER Bilateral 3/15/2024    Procedure: INSERTION, TISSUE EXPANDER, BREAST (bilateral breast recon with placement of tissue expanders and acellular dermal matrix, use of intraoperative fluoroscein angiography);  Surgeon: Ubaldo David MD;  [facility];  Service: Plastics;  Laterality: Bilateral;  300mL filled for each tissue expander    JOINT REPLACEMENT      bilateral knees    LUMBAR LAMINECTOMY N/A 2023    Procedure: LAMINECTOMY, SPINE, LUMBAR L4  SNS:SSEP/EMG;  Surgeon: Kamlesh Gomez MD;  Location: [facility];  Service: Orthopedics;  Laterality: N/A;    LUMBAR LAMINECTOMY N/A 2023    Procedure: LAMINECTOMY, SPINE, LUMBAR L4;  Surgeon: Kamlesh Gomez MD;  Location: [address];  Service: Neurosurgery;  Laterality: N/A;    SENTINEL LYMPH NODE BIOPSY Right 3/15/2024    Procedure: BIOPSY, LYMPH NODE, SENTINEL - RIGHT MagTrace Injected in office SentiMag needed No NUC MED;  Surgeon: Christina Romero MD;  Location: [facility];  Service: General;  Laterality: Right;  MagTrace Injected in office  SentiMag needed  No NUC MED    SIMPLE MASTECTOMY Bilateral 3/15/2024    Procedure: MASTECTOMY, SIMPLE - BILATERAL;  Surgeon: Christina Romero MD;  Location: [facility];  Service: General;  Laterality: Bilateral;    SPINE SURGERY      TONSILLECTOMY      TOTAL KNEE ARTHROPLASTY Bilateral     TUBAL LIGATION      WISDOM TOOTH EXTRACTION         Family History:   Family History   Problem Relation Name Age of Onset    Diabetes type I Mother Paul Coughlinmariankristyn     Multiple myeloma Mother Paul Coughlinandrewgeremiasalexandre 55    Diabetes Mother Paul Hamm     Hypertension Mother Paul Hamm     Kidney disease Mother Paul Hamm     Alzheimer's disease Father              No Known Problems Sister      No Known Problems Brother      Pancreatic cancer Maternal Grandmother  70    Breast cancer Paternal Grandmother Claudette Hamm 55    Liver cancer Paternal Grandmother Claudette Hamm 55    Breast cancer Paternal Aunt Lesley Fulton 62       Social History:  reports that she has been smoking cigarettes. She started smoking about 5 years ago. She has a 2.5 pack-year smoking history. She has never used smokeless tobacco. She reports that she does not currently use alcohol after a past usage of about 1.0 standard drink of alcohol per week. She reports that she does not use drugs.    Allergies:  Review of patient's allergies indicates:   Allergen Reactions     Latex, natural rubber Hives, Anxiety, Itching, Palpitations and Swelling     Other Reaction(s): Unknown    Tomato (solanum lycopersicum) Hives and Swelling       Medications:  Current Outpatient Medications   Medication Sig Dispense Refill    ALPRAZolam (XANAX) 0.25 MG tablet Take by mouth.      aspirin (ECOTRIN) 81 MG EC tablet Take 81 mg by mouth once daily.      azelastine (ASTELIN) 137 mcg (0.1 %) nasal spray SMARTSIG:Both Nares      blood sugar diagnostic (ONETOUCH ULTRA TEST) Strp CHECK SUGARS DAILY      blood sugar diagnostic (TRUE METRIX GLUCOSE TEST STRIP) Strp CHECK SUGARS DAILY      blood-glucose meter kit Check sugars daily. For T2DM without insulin (E11.9)      busPIRone (BUSPAR) 30 MG Tab Take 30 mg by mouth 2 (two) times a day.      carvediloL (COREG) 3.125 MG tablet Take 3.125 mg by mouth 2 (two) times daily.      celecoxib (CELEBREX) 200 MG capsule       FEROSUL 325 mg (65 mg iron) Tab tablet Take by mouth every other day.      fexofenadine (ALLEGRA) 180 MG tablet Take 1 tablet by mouth every morning.      fluoride, sodium, (PREVIDENT 5000 PLUS) 1.1 % Crea       ipratropium (ATROVENT) 42 mcg (0.06 %) nasal spray by Each Nostril route.      lamoTRIgine (LAMICTAL) 150 MG Tab TAKE ONE TABLET BY MOUTH AT BEDTIME SEIZURES FOR MOOD STABILIZATION      letrozole (FEMARA) 2.5 mg Tab Take 1 tablet (2.5 mg total) by mouth once daily. 30 tablet 3    meloxicam (MOBIC) 15 MG tablet Take 15 mg by mouth once daily. (Patient not taking: Reported on 6/24/2024)      metFORMIN (GLUCOPHAGE) 500 MG tablet Take 500 mg by mouth 2 (two) times daily with meals.      montelukast (SINGULAIR) 10 mg tablet Take 10 mg by mouth once daily.      ondansetron (ZOFRAN-ODT) 4 MG TbDL Take by mouth. (Patient not taking: Reported on 6/24/2024)      pantoprazole (PROTONIX) 40 MG tablet TAKE ONE TABLET BY MOUTH EVERY DAY FOR ACID REFLUX      rosuvastatin (CRESTOR) 10 MG tablet Take 10 mg by mouth once daily.       sertraline 200 mg Cap Take 200 mg by mouth once daily.      SODIUM FLUORIDE 5000 DRY MOUTH 1.1 % Pste       traZODone (DESYREL) 100 MG tablet Take 100 mg by mouth every evening.      vitamin D (VITAMIN D3) 1000 units Tab Take 1,000 Units by mouth once daily.       No current facility-administered medications for this visit.        Review of Systems:  ROS     Objective:     Vitals: There were no vitals filed for this visit.  BMI: There is no height or weight on file to calculate BMI.          Assessment:   Right Breast Cancer Stage IA (T2(m2) N0(I+)M0)--Diagnosed 1/25/24   Plan:   -Continue Femara 1 tablet (2.5 mg total) by mouth once daily   -Continue Calcium and Vitamin D3.   -Baseline DEXA on 6/17/24 with normal bone density; next DEXA planned for June 2026  -Start screening colonoscopy age 38 and continue every 5 years due to genetic mutation placing jag risk for colon cancer. Establish care with GI services.   -Continue surveillance visits every 3 months.   -Plans to RTC on 10/8/24 for 3 month f/u with NP.       Survivorship treatment summary and care plan discussed with patient. We reviewed her diagnosis and previous treatment that was given. Discussed plans for future follow-up and monitoring. All current medical, psychosocial and practical issues were addressed. Potential late-term side effects reviewed. Counseled on healthy lifestyle and behavior modifications that could reduce risk of recurrence. Limit alcohol to less than one drink per day, Exercise at least 150 minutes per week of moderate intensity aerobic activity or at least 75 minutes of vigorous activity, Maintaining a healthy weight, Limit red meat to no more than 2-3x per week, Reduce processed foods and meat. Also encouraged wide variety of fruits and vegetables, specifically cruciferous vegetables.    I spent a total of 20 minutes on the day of the visit.This includes face to face time and non-face to face time preparing to see the patient  (eg, review of tests), obtaining and/or reviewing separately obtained history, documenting clinical information in the electronic or other health record, independently interpreting results and communicating results to the patient/family/caregiver, or care coordinator.  Patient states understanding with no further questions.     BROOKS REDD FNP-C  PATIENT EDUCATOR  [facility]

## 2024-07-11 NOTE — PROGRESS NOTES
Oncology Nutrition Survivorship Education    Brenden Noemi Parisa   1970    Oncology Provider:   Jailene Jose MD    Reason for Visit:  Survivorship    Oncology/Hematology Diagnosis:   Breast CA    Treatment Planned:  Femara    Treatment and Surgical History:  Bilateral mastectomies done 3/15/24     Nutrition Recommendations:  1. Eat plant-based food for at least half of your diet and Eat less than 18 oz red meat (beef, pork) per week  2. Moderate-intensity activity for at least 150-300 minutes per week; OR vigorous-intensity activity for at least 75 minutes per week; OR combination of moderate- and vigorous-intensity activity  3. Avoid alcohol intake if possible, or limit to 1 drink per day (1 drink = 12 oz beer, 5 oz wine, 1.5 oz liquor)  4. 1200 mg calcium per day (600 mg twice daily)      Nutrition Assessment    7/10/24: This is a 54 y.o.female with a medical diagnosis of breast CA. She reports good appetite and po intake. No c/o n/v/c/d.     Nutrition Factors Affecting Intake  none identified    PMHx: DM, HLD, HTN, gastric sleeve    Allergies: Latex, natural rubber and Tomato (solanum lycopersicum)    Current Medications:    Current Outpatient Medications:     ALPRAZolam (XANAX) 0.25 MG tablet, Take by mouth., Disp: , Rfl:     aspirin (ECOTRIN) 81 MG EC tablet, Take 81 mg by mouth once daily., Disp: , Rfl:     azelastine (ASTELIN) 137 mcg (0.1 %) nasal spray, SMARTSIG:Both Nares, Disp: , Rfl:     blood sugar diagnostic (ONETOUCH ULTRA TEST) Strp, CHECK SUGARS DAILY, Disp: , Rfl:     blood sugar diagnostic (TRUE METRIX GLUCOSE TEST STRIP) Strp, CHECK SUGARS DAILY, Disp: , Rfl:     blood-glucose meter kit, Check sugars daily. For T2DM without insulin (E11.9), Disp: , Rfl:     busPIRone (BUSPAR) 30 MG Tab, Take 30 mg by mouth 2 (two) times a day., Disp: , Rfl:     carvediloL (COREG) 3.125 MG tablet, Take 3.125 mg by mouth 2 (two) times daily., Disp: , Rfl:     celecoxib (CELEBREX) 200 MG capsule, ,  "Disp: , Rfl:     FEROSUL 325 mg (65 mg iron) Tab tablet, Take by mouth every other day., Disp: , Rfl:     fexofenadine (ALLEGRA) 180 MG tablet, Take 1 tablet by mouth every morning., Disp: , Rfl:     fluoride, sodium, (PREVIDENT 5000 PLUS) 1.1 % Crea, , Disp: , Rfl:     ipratropium (ATROVENT) 42 mcg (0.06 %) nasal spray, by Each Nostril route., Disp: , Rfl:     lamoTRIgine (LAMICTAL) 150 MG Tab, TAKE ONE TABLET BY MOUTH AT BEDTIME SEIZURES FOR MOOD STABILIZATION, Disp: , Rfl:     letrozole (FEMARA) 2.5 mg Tab, Take 1 tablet (2.5 mg total) by mouth once daily., Disp: 30 tablet, Rfl: 3    meloxicam (MOBIC) 15 MG tablet, Take 15 mg by mouth once daily. (Patient not taking: Reported on 6/24/2024), Disp: , Rfl:     metFORMIN (GLUCOPHAGE) 500 MG tablet, Take 500 mg by mouth 2 (two) times daily with meals., Disp: , Rfl:     montelukast (SINGULAIR) 10 mg tablet, Take 10 mg by mouth once daily., Disp: , Rfl:     ondansetron (ZOFRAN-ODT) 4 MG TbDL, Take by mouth. (Patient not taking: Reported on 6/24/2024), Disp: , Rfl:     pantoprazole (PROTONIX) 40 MG tablet, TAKE ONE TABLET BY MOUTH EVERY DAY FOR ACID REFLUX, Disp: , Rfl:     rosuvastatin (CRESTOR) 10 MG tablet, Take 10 mg by mouth once daily., Disp: , Rfl:     sertraline 200 mg Cap, Take 200 mg by mouth once daily., Disp: , Rfl:     SODIUM FLUORIDE 5000 DRY MOUTH 1.1 % Pste, , Disp: , Rfl:     traZODone (DESYREL) 100 MG tablet, Take 100 mg by mouth every evening., Disp: , Rfl:     vitamin D (VITAMIN D3) 1000 units Tab, Take 1,000 Units by mouth once daily., Disp: , Rfl:     Labs: no new    Anthropometrics    Height:   Ht Readings from Last 1 Encounters:   06/26/24 5' 4" (1.626 m)      Weight:   Wt Readings from Last 1 Encounters:   06/26/24 81.9 kg (180 lb 9.6 oz)        Usual Body Weight: 81.9 kg (180 lb)  % Weight Change: 0    BMI: 31 (obese I)    Ideal Weight: 54.5 kg (120 lb)  % Ideal Weight: 150%      Nutrition Diagnosis    PES: Food and nutrition related knowledge " deficit related to chronic illness as evidenced by lack of prior exposure to survivorship nutrition. (resolved)     Nutrition Risk  low    Nutrition Intervention    Interventions(treatment strategy):  general/healthful diet and purpose of nutrition education    Education Provided: survivorship nutrition therapy  Teaching Method: explanation  Comprehension: verbalizes understanding  Barriers to Learning: none evident  Expected Compliance: good  Comments: All questions were answered and dietitian's contact information was provided.      Nutrition Monitoring and Evaluation    Ongoing monitoring not warranted at this time. Please consult RD prn.        Ayala Ornelas MS, RD, , LDN

## 2024-07-15 NOTE — DISCHARGE INSTRUCTIONS
Breast Reconstruction Discharge Instructions     What care is needed at home?   Sleep with your head and chest raised on 2 to 3 pillows to lower swelling.  Your doctor may suggest you use an ice pack over the painful part. Never put ice right on the skin. Do not leave the ice on more than 10 to 15 minutes at a time.  Cough and take deep breaths to help keep your lungs clear.  Be sure to wash your hands before and after touching your wound or dressing.   Leave your dressing in place. Do not remove the dressing. Keep clean, dry, and intact at all times.  Sponge bathe only. You may shower when released by your doctor.  Do not lift anything over 10 pounds (4.5 kg)  Ask your doctor when you may go back to your normal activities like work or exercising  Avoid using creams until your skin has healed to lower your risk of infection or told by your doctor.  Wear your supportive/surgical bra at all times except when showering. Put on another open front supportive bra when washing the old one.   Tops that button up the front are easier to put on and take off than those that slip over your head.  Your bowel movements may take some time to get back to normal. Eat small meals high in fiber to avoid hard stools. Drink 6 to 8 glasses of water each day.  Try to walk each day. Start by walking a little more than you did the day before. Walking boosts blood flow and helps prevent lung, belly, and blood problems.  What follow-up care is needed?   Your doctor may ask you to make visits to the office to check on your progress. Be sure to keep your visits.  Any drains placed in your breasts will be removed 2 to 4 days after surgery.  You may have stitches or staples. If so, your doctor will often want to remove the stitches or staples in 1 to 2 weeks.  If you have a tissue expander, your doctor may need to fill the pouch with saline. Your doctor will set the schedule for follow-up.  If you had silicone gel implants, your doctor will check  them every few years using ultrasound or MRI.  Your breasts implants may need to be replaced over time.  A second smaller surgery may need to be done. The surgery would work on fixing the size, shape, and color of your nipple.  Will physical activity be limited?   You will need to limit your activity for a while. Avoid lifting, sports, and sex until your doctor says physical activity is OK. Talk with your doctor about the right amount of activity for you.  What problems could happen?   Bleeding  Infection  Scarring or wrinkled skin over the implant  Implant may make cancer harder to find  Breast sensation may not be the same as your natural breast  Leakage of implants  Problem with breastfeeding  When do I need to call the doctor?   Signs of infection, such as a fever of 100.4°F (38°C) or higher, chills, wound that will not heal.  Signs of wound infection, such as swelling, redness, warmth around the wound; too much pain when touched; yellowish, greenish, or bloody discharge; foul smell coming from the cut site; cut site opens up.  Drain seems clogged and there is fluid under your surgery cuts  Cough, shortness of breath, chest pain  Upset stomach and throwing up that are not helped by nausea drugs  Pain that is not helped by pain drugs.   Patient Education     [place] Drain      What will the results be?   This drain gets rid of extra fluid from your cut site. This will help it to get better faster.  What happens after the procedure?   You may be sore where the drain was put in. Your doctor will give you drugs for the pain.  You may need to stay in the hospital until you are well enough to go home. Your doctor will watch to see how much fluid is in the bulb each day.  The nurses will empty the drain when it gets about half full or at certain times during the day and measure the amount of fluid emptied.  What care is needed at home?   Wash your hands with soap and water every time before and after you empty  the drain. Do not change your dressing around the YASMEEN drain.  Do not put any lotions, creams, or oils around the YASMEEN drain site.  Empty the drain. Remove the stopper at the end. Pour out the drainage. Your doctor may want you to measure how much drainage is in the bulb. Then, squeeze the bulb to get rid of air and put the stopper back in place.  Care for the tube if there is a clot in it. Squeeze the tube over the clot. You can also squeeze the tube from your skin to the bulb and then let it go. This should open the tube.   Measure the amount of fluid in the drain ball after you empty it each day up to 3 times a day and write it down on a chart.  Look for signs of infection. Your doctor will want to know if you have a fever of 100.4°F (38°C) or higher, chills, if you get very red and sore around the drain, or if the fluid in the drain turns cloudy or smells. Your doctor will want to know if the skin around the drain has any fluid or pus coming out of it.  Sleep on the side opposite to the YASMEEN drain. This prevents any kinking of the tubing or pulling out the suction bulb.  Avoid bumping the drain.  You can hold the drain up with a safety pin while you are moving around.   Ask your doctor about your activity level while you have your drain in place.  What follow-up care is needed?   Be sure to keep your follow up visit.  Your doctor will tell you when the drain may be removed.  What problems could happen?   Fluid leaking from the cut site  Bleeding  Infection  Clot in the tube  Tube and drain falls out  Cut area opens up  Drain stops working  When do I need to call the doctor?   Signs of infection at the drain site. These include swelling, redness, warmth around the wound, too much pain when touched, yellowish or greenish or bloody discharge, foul smell coming from the cut site.  Drain becomes loose, comes apart, abruptly stops draining, or falls out

## 2024-07-21 DIAGNOSIS — Z17.0 MALIGNANT NEOPLASM OF UPPER-OUTER QUADRANT OF RIGHT BREAST IN FEMALE, ESTROGEN RECEPTOR POSITIVE: ICD-10-CM

## 2024-07-21 DIAGNOSIS — C50.411 MALIGNANT NEOPLASM OF UPPER-OUTER QUADRANT OF RIGHT BREAST IN FEMALE, ESTROGEN RECEPTOR POSITIVE: ICD-10-CM

## 2024-07-22 ENCOUNTER — ANESTHESIA EVENT (OUTPATIENT)
Dept: SURGERY | Facility: HOSPITAL | Age: 54
DRG: 585 | End: 2024-07-22
Payer: OTHER GOVERNMENT

## 2024-07-22 ENCOUNTER — OFFICE VISIT (OUTPATIENT)
Dept: HEMATOLOGY/ONCOLOGY | Facility: CLINIC | Age: 54
End: 2024-07-22
Payer: OTHER GOVERNMENT

## 2024-07-22 DIAGNOSIS — R45.89 ANXIETY ABOUT HEALTH: Primary | ICD-10-CM

## 2024-07-22 PROCEDURE — 99211 OFF/OP EST MAY X REQ PHY/QHP: CPT | Mod: PBBFAC | Performed by: SOCIAL WORKER

## 2024-07-22 PROCEDURE — 90837 PSYTX W PT 60 MINUTES: CPT | Mod: ,,, | Performed by: SOCIAL WORKER

## 2024-07-22 PROCEDURE — 99999 PR PBB SHADOW E&M-EST. PATIENT-LVL I: CPT | Mod: PBBFAC,,, | Performed by: SOCIAL WORKER

## 2024-07-22 RX ORDER — LETROZOLE 2.5 MG/1
2.5 TABLET, FILM COATED ORAL
Qty: 90 TABLET | Refills: 0 | Status: SHIPPED | OUTPATIENT
Start: 2024-07-22

## 2024-07-22 RX ORDER — HEPARIN SODIUM 5000 [USP'U]/ML
5000 INJECTION, SOLUTION INTRAVENOUS; SUBCUTANEOUS ONCE
OUTPATIENT
Start: 2024-07-22 | End: 2024-07-22

## 2024-07-22 NOTE — PROGRESS NOTES
Psychiatry Initial Visit (PhD/LCSW)  Diagnostic Interview - CPT 07807    Date: 7/22/2024    Site: [place]    Referral source: Jailene Jose MD    Clinical status of patient: Outpatient    Brenden Noemi Parisa, a 54 y.o. female, for initial evaluation visit.  Met with patient.    Chief complaint/reason for encounter: interpersonal and screening    History of present illness: This was the initial appointment with Noemi. We met for 60 minutes, gathering information and building rapport. She was previously in therapy. [facility]. She has dealt with anxiety and depression in the past. She previously took medication for anxiety and depression, however, she no longer takes any medication for these diagnoses as she reports. I gathered information about her childhood, her family and goals she would like to achieve in therapy. Two goals she discussed was to become more comfortable with herself and more accepting of herself. We will continue to dive into these areas as I use an integrative approach. She is having reconstruction surgery tomorrow, therefore we scheduled her next appointment for approximately 2 weeks, virtually.    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 singleBorn and raised in: deferred because not relevant in current clinical context, Siblings and parents relationship/status: Mother is alive, father  since  2 sisters and 1 brother, closest to her youngest sister, Highest level of education: bachelors degree and masters degree, Childhood history is described as: I will continue to gather information related to her childhood and her relationships with her family members, Relationships/children: She is single, she has a son and a daughter. Her son lives in [place] and her daughter lives in [place], Employment status: retired, : was previously in the , and Spiritual: Describes herself as spiritual and a believer.    Substance use:   Alcohol:  not acquired   Drugs: none   Tobacco: none   Caffeine: Did not assess    Current medications and drug reactions (include OTC, herbal): see medication list     Strengths and liabilities: Strength: Patient accepts guidance/feedback, Strength: Patient is expressive/articulate., Strength: Patient is intelligent., Strength: Patient is motivated for change., Strength: Patient is physically healthy., Strength: Patient has positive support network., Strength: Patient has reasonable judgment., Strength: Patient is stable.    Current Evaluation:     Mental Status Exam:  General Appearance:  unremarkable, age appropriate, well dressed, neatly groomed   Speech: normal tone, normal rate, normal pitch, normal volume      Level of Cooperation: cooperative      Thought Processes: normal and logical   Mood: steady, happy      Thought Content: normal, no suicidality, no homicidality, delusions, or paranoia   Affect: congruent and appropriate, appropriate   Orientation: Oriented x3   Memory: Not assessed   Attention Span & Concentration: intact   Fund of General Knowledge: intact and appropriate to age and  level of education   Abstract Reasoning: Not assessed   Judgment & Insight: good     Language  intact     Diagnostic Impression - Plan:       ICD-10-CM ICD-9-CM   1. Anxiety about health  R45.89 799.29       Plan:individual psychotherapy    Return to Clinic: as scheduled    Length of Service (minutes): 60

## 2024-07-23 ENCOUNTER — ANESTHESIA (OUTPATIENT)
Dept: SURGERY | Facility: HOSPITAL | Age: 54
DRG: 585 | End: 2024-07-23
Payer: OTHER GOVERNMENT

## 2024-07-23 ENCOUNTER — HOSPITAL ENCOUNTER (INPATIENT)
Facility: HOSPITAL | Age: 54
LOS: 2 days | Discharge: HOME | DRG: 585 | End: 2024-07-25
Attending: SURGERY | Admitting: SURGERY
Payer: OTHER GOVERNMENT

## 2024-07-23 DIAGNOSIS — Z42.1 ENCOUNTER FOR BREAST RECONSTRUCTION FOLLOWING MASTECTOMY: ICD-10-CM

## 2024-07-23 DIAGNOSIS — Z85.3 PERSONAL HISTORY OF MALIGNANT NEOPLASM OF BREAST: ICD-10-CM

## 2024-07-23 LAB
GROUP & RH: NORMAL
INDIRECT COOMBS: NORMAL
POCT GLUCOSE: 136 MG/DL (ref 70–110)
POCT GLUCOSE: 99 MG/DL (ref 70–110)
SPECIMEN OUTDATE: NORMAL

## 2024-07-23 PROCEDURE — 11000001 HC ACUTE MED/SURG PRIVATE ROOM

## 2024-07-23 PROCEDURE — 27201423 OPTIME MED/SURG SUP & DEVICES STERILE SUPPLY: Performed by: SURGERY

## 2024-07-23 PROCEDURE — 15860 IV NJX TST VASC FLO FLAP/GRF: CPT | Mod: 51,,, | Performed by: SURGERY

## 2024-07-23 PROCEDURE — 63600175 PHARM REV CODE 636 W HCPCS: Performed by: SURGERY

## 2024-07-23 PROCEDURE — P9047 ALBUMIN (HUMAN), 25%, 50ML: HCPCS | Mod: JZ,JG | Performed by: NURSE ANESTHETIST, CERTIFIED REGISTERED

## 2024-07-23 PROCEDURE — 37000009 HC ANESTHESIA EA ADD 15 MINS: Performed by: SURGERY

## 2024-07-23 PROCEDURE — 71000039 HC RECOVERY, EACH ADD'L HOUR: Performed by: SURGERY

## 2024-07-23 PROCEDURE — 71000033 HC RECOVERY, INTIAL HOUR: Performed by: SURGERY

## 2024-07-23 PROCEDURE — 63600175 PHARM REV CODE 636 W HCPCS: Performed by: NURSE ANESTHETIST, CERTIFIED REGISTERED

## 2024-07-23 PROCEDURE — 36000708 HC OR TIME LEV III 1ST 15 MIN: Performed by: SURGERY

## 2024-07-23 PROCEDURE — 86850 RBC ANTIBODY SCREEN: CPT | Performed by: SURGERY

## 2024-07-23 PROCEDURE — C1729 CATH, DRAINAGE: HCPCS | Performed by: SURGERY

## 2024-07-23 PROCEDURE — S2068 BREAST DIEP OR SIEA FLAP: HCPCS | Mod: RT,,, | Performed by: SURGERY

## 2024-07-23 PROCEDURE — 25000003 PHARM REV CODE 250: Performed by: NURSE ANESTHETIST, CERTIFIED REGISTERED

## 2024-07-23 PROCEDURE — 37000008 HC ANESTHESIA 1ST 15 MINUTES: Performed by: SURGERY

## 2024-07-23 PROCEDURE — 19370 REVJ PERI-IMPLT CAPSULE BRST: CPT | Mod: 51,RT,, | Performed by: SURGERY

## 2024-07-23 PROCEDURE — 36000709 HC OR TIME LEV III EA ADD 15 MIN: Performed by: SURGERY

## 2024-07-23 PROCEDURE — 71000016 HC POSTOP RECOV ADDL HR: Performed by: SURGERY

## 2024-07-23 PROCEDURE — C9290 INJ, BUPIVACAINE LIPOSOME: HCPCS | Performed by: SURGERY

## 2024-07-23 PROCEDURE — 71000015 HC POSTOP RECOV 1ST HR: Performed by: SURGERY

## 2024-07-23 PROCEDURE — 0HPT0NZ REMOVAL OF TISSUE EXPANDER FROM RIGHT BREAST, OPEN APPROACH: ICD-10-PCS | Performed by: SURGERY

## 2024-07-23 PROCEDURE — 63600175 PHARM REV CODE 636 W HCPCS: Performed by: ANESTHESIOLOGY

## 2024-07-23 PROCEDURE — 25000003 PHARM REV CODE 250: Performed by: SURGERY

## 2024-07-23 PROCEDURE — 86901 BLOOD TYPING SEROLOGIC RH(D): CPT | Performed by: SURGERY

## 2024-07-23 PROCEDURE — 0HRV077 REPLACEMENT OF BILATERAL BREAST USING DEEP INFERIOR EPIGASTRIC ARTERY PERFORATOR FLAP, OPEN APPROACH: ICD-10-PCS | Performed by: SURGERY

## 2024-07-23 PROCEDURE — 63600175 PHARM REV CODE 636 W HCPCS

## 2024-07-23 PROCEDURE — 21600 PARTIAL REMOVAL OF RIB: CPT | Mod: 51,RT,, | Performed by: SURGERY

## 2024-07-23 PROCEDURE — 4A1GXSH MONITORING OF SKIN AND BREAST VASCULAR PERFUSION USING INDOCYANINE GREEN DYE, EXTERNAL APPROACH: ICD-10-PCS | Performed by: SURGERY

## 2024-07-23 PROCEDURE — 11971 RMVL TIS XPNDR WO INSJ IMPLT: CPT | Mod: 51,RT,, | Performed by: SURGERY

## 2024-07-23 PROCEDURE — 0HPU0NZ REMOVAL OF TISSUE EXPANDER FROM LEFT BREAST, OPEN APPROACH: ICD-10-PCS | Performed by: SURGERY

## 2024-07-23 PROCEDURE — 25000003 PHARM REV CODE 250

## 2024-07-23 DEVICE — THE GEM MICROVASCULAR ANASTOMOTIC COUPLER DEVICE RINGS ARE MADE OF POLYETHYLENE AND SURGICAL GRADE STAINLESS STEEL PINS. A PROTECTIVE COVER AND JAW ASSEMBLY PROTECT THE RINGS AND ALLOW FOR EASY LOADING ONTO THE ANASTOMOTIC INSTRUMENT. BOTH THE PROTECTIVE COVER AND JAW ASSEMBLY ARE DISPOSABLE. THE GEM MICROVASCULAR ANASTOMOTIC COUPLER DEVICE IS SINGLE-USE AND AVAILABLE IN VARIOUS SIZES
Type: IMPLANTABLE DEVICE | Site: BREAST | Status: FUNCTIONAL
Brand: GEM MICROVASCULAR ANASTOMOTIC COUPLER

## 2024-07-23 DEVICE — IMPLANTABLE DEVICE: Type: IMPLANTABLE DEVICE | Site: BREAST | Status: FUNCTIONAL

## 2024-07-23 RX ORDER — ONDANSETRON HYDROCHLORIDE 2 MG/ML
INJECTION, SOLUTION INTRAMUSCULAR; INTRAVENOUS
Status: DISCONTINUED | OUTPATIENT
Start: 2024-07-23 | End: 2024-07-23

## 2024-07-23 RX ORDER — HEPARIN SODIUM 5000 [USP'U]/ML
5000 INJECTION, SOLUTION INTRAVENOUS; SUBCUTANEOUS ONCE
Status: COMPLETED | OUTPATIENT
Start: 2024-07-23 | End: 2024-07-23

## 2024-07-23 RX ORDER — PROPOFOL 10 MG/ML
VIAL (ML) INTRAVENOUS
Status: DISCONTINUED | OUTPATIENT
Start: 2024-07-23 | End: 2024-07-23

## 2024-07-23 RX ORDER — PROCHLORPERAZINE EDISYLATE 5 MG/ML
5 INJECTION INTRAMUSCULAR; INTRAVENOUS EVERY 6 HOURS PRN
Status: DISCONTINUED | OUTPATIENT
Start: 2024-07-23 | End: 2024-07-25 | Stop reason: HOSPADM

## 2024-07-23 RX ORDER — MORPHINE SULFATE 4 MG/ML
2 INJECTION, SOLUTION INTRAMUSCULAR; INTRAVENOUS EVERY 4 HOURS PRN
Status: DISCONTINUED | OUTPATIENT
Start: 2024-07-23 | End: 2024-07-25 | Stop reason: HOSPADM

## 2024-07-23 RX ORDER — MIDAZOLAM HYDROCHLORIDE 2 MG/2ML
2 INJECTION, SOLUTION INTRAMUSCULAR; INTRAVENOUS ONCE AS NEEDED
OUTPATIENT
Start: 2024-07-23 | End: 2035-12-19

## 2024-07-23 RX ORDER — CEFAZOLIN SODIUM 1 G/3ML
INJECTION, POWDER, FOR SOLUTION INTRAMUSCULAR; INTRAVENOUS
Status: DISCONTINUED | OUTPATIENT
Start: 2024-07-23 | End: 2024-07-23 | Stop reason: HOSPADM

## 2024-07-23 RX ORDER — PAPAVERINE HYDROCHLORIDE 30 MG/ML
INJECTION INTRAMUSCULAR; INTRAVENOUS
Status: DISCONTINUED
Start: 2024-07-23 | End: 2024-07-23 | Stop reason: WASHOUT

## 2024-07-23 RX ORDER — ALBUMIN HUMAN 250 G/1000ML
SOLUTION INTRAVENOUS
Status: COMPLETED
Start: 2024-07-23 | End: 2024-07-23

## 2024-07-23 RX ORDER — ONDANSETRON HYDROCHLORIDE 2 MG/ML
4 INJECTION, SOLUTION INTRAVENOUS DAILY PRN
Status: DISCONTINUED | OUTPATIENT
Start: 2024-07-23 | End: 2024-07-23 | Stop reason: HOSPADM

## 2024-07-23 RX ORDER — ACETAMINOPHEN 325 MG/1
650 TABLET ORAL EVERY 4 HOURS PRN
Status: DISCONTINUED | OUTPATIENT
Start: 2024-07-24 | End: 2024-07-23 | Stop reason: HOSPADM

## 2024-07-23 RX ORDER — SODIUM CHLORIDE, SODIUM LACTATE, POTASSIUM CHLORIDE, CALCIUM CHLORIDE 600; 310; 30; 20 MG/100ML; MG/100ML; MG/100ML; MG/100ML
INJECTION, SOLUTION INTRAVENOUS CONTINUOUS
Status: DISCONTINUED | OUTPATIENT
Start: 2024-07-24 | End: 2024-07-24

## 2024-07-23 RX ORDER — LIDOCAINE HYDROCHLORIDE 10 MG/ML
1 INJECTION, SOLUTION EPIDURAL; INFILTRATION; INTRACAUDAL; PERINEURAL ONCE
OUTPATIENT
Start: 2024-07-23 | End: 2024-07-23

## 2024-07-23 RX ORDER — CEFAZOLIN SODIUM 2 G/50ML
2 SOLUTION INTRAVENOUS
Status: COMPLETED | OUTPATIENT
Start: 2024-07-23 | End: 2024-07-24

## 2024-07-23 RX ORDER — ROCURONIUM BROMIDE 10 MG/ML
INJECTION, SOLUTION INTRAVENOUS
Status: DISCONTINUED | OUTPATIENT
Start: 2024-07-23 | End: 2024-07-23

## 2024-07-23 RX ORDER — GENTAMICIN 40 MG/ML
INJECTION, SOLUTION INTRAMUSCULAR; INTRAVENOUS
Status: DISCONTINUED
Start: 2024-07-23 | End: 2024-07-24 | Stop reason: WASHOUT

## 2024-07-23 RX ORDER — CYCLOBENZAPRINE HCL 10 MG
10 TABLET ORAL 3 TIMES DAILY PRN
Status: DISCONTINUED | OUTPATIENT
Start: 2024-07-23 | End: 2024-07-25 | Stop reason: HOSPADM

## 2024-07-23 RX ORDER — CALCIUM CHLORIDE INJECTION 100 MG/ML
INJECTION, SOLUTION INTRAVENOUS
Status: COMPLETED
Start: 2024-07-23 | End: 2024-07-23

## 2024-07-23 RX ORDER — SODIUM CHLORIDE, SODIUM LACTATE, POTASSIUM CHLORIDE, CALCIUM CHLORIDE 600; 310; 30; 20 MG/100ML; MG/100ML; MG/100ML; MG/100ML
INJECTION, SOLUTION INTRAVENOUS CONTINUOUS
OUTPATIENT
Start: 2024-07-23

## 2024-07-23 RX ORDER — HYDROMORPHONE HYDROCHLORIDE 2 MG/ML
0.4 INJECTION, SOLUTION INTRAMUSCULAR; INTRAVENOUS; SUBCUTANEOUS EVERY 5 MIN PRN
Status: DISCONTINUED | OUTPATIENT
Start: 2024-07-23 | End: 2024-07-23 | Stop reason: HOSPADM

## 2024-07-23 RX ORDER — LIDOCAINE HYDROCHLORIDE 10 MG/ML
INJECTION, SOLUTION EPIDURAL; INFILTRATION; INTRACAUDAL; PERINEURAL
Status: DISCONTINUED | OUTPATIENT
Start: 2024-07-23 | End: 2024-07-23

## 2024-07-23 RX ORDER — CEFAZOLIN SODIUM 1 G/3ML
INJECTION, POWDER, FOR SOLUTION INTRAMUSCULAR; INTRAVENOUS
Status: DISCONTINUED
Start: 2024-07-23 | End: 2024-07-24 | Stop reason: WASHOUT

## 2024-07-23 RX ORDER — MEPERIDINE HYDROCHLORIDE 25 MG/ML
12.5 INJECTION INTRAMUSCULAR; INTRAVENOUS; SUBCUTANEOUS ONCE
Status: DISCONTINUED | OUTPATIENT
Start: 2024-07-24 | End: 2024-07-23 | Stop reason: HOSPADM

## 2024-07-23 RX ORDER — MUPIROCIN 20 MG/G
OINTMENT TOPICAL 2 TIMES DAILY
Status: DISCONTINUED | OUTPATIENT
Start: 2024-07-23 | End: 2024-07-25 | Stop reason: HOSPADM

## 2024-07-23 RX ORDER — ACETAMINOPHEN 325 MG/1
650 TABLET ORAL EVERY 4 HOURS PRN
OUTPATIENT
Start: 2024-07-23

## 2024-07-23 RX ORDER — ONDANSETRON HYDROCHLORIDE 2 MG/ML
4 INJECTION, SOLUTION INTRAVENOUS EVERY 12 HOURS PRN
Status: DISCONTINUED | OUTPATIENT
Start: 2024-07-23 | End: 2024-07-25 | Stop reason: HOSPADM

## 2024-07-23 RX ORDER — HEPARIN SODIUM 5000 [USP'U]/ML
INJECTION, SOLUTION INTRAVENOUS; SUBCUTANEOUS
Status: DISPENSED
Start: 2024-07-23 | End: 2024-07-23

## 2024-07-23 RX ORDER — CEFAZOLIN SODIUM 1 G/3ML
INJECTION, POWDER, FOR SOLUTION INTRAMUSCULAR; INTRAVENOUS
Status: DISPENSED
Start: 2024-07-23 | End: 2024-07-23

## 2024-07-23 RX ORDER — SODIUM CHLORIDE, SODIUM GLUCONATE, SODIUM ACETATE, POTASSIUM CHLORIDE AND MAGNESIUM CHLORIDE 30; 37; 368; 526; 502 MG/100ML; MG/100ML; MG/100ML; MG/100ML; MG/100ML
INJECTION, SOLUTION INTRAVENOUS CONTINUOUS
OUTPATIENT
Start: 2024-07-23 | End: 2024-08-22

## 2024-07-23 RX ORDER — ONDANSETRON 4 MG/1
4 TABLET, ORALLY DISINTEGRATING ORAL ONCE
Status: DISCONTINUED | OUTPATIENT
Start: 2024-07-24 | End: 2024-07-23

## 2024-07-23 RX ORDER — METHYLENE BLUE 5 MG/ML
INJECTION INTRAVENOUS
Status: DISCONTINUED | OUTPATIENT
Start: 2024-07-23 | End: 2024-07-23 | Stop reason: HOSPADM

## 2024-07-23 RX ORDER — MIDAZOLAM HYDROCHLORIDE 2 MG/2ML
2 INJECTION, SOLUTION INTRAMUSCULAR; INTRAVENOUS ONCE AS NEEDED
Status: COMPLETED | OUTPATIENT
Start: 2024-07-24 | End: 2024-07-23

## 2024-07-23 RX ORDER — ONDANSETRON 2 MG/ML
INJECTION INTRAMUSCULAR; INTRAVENOUS
Status: DISCONTINUED | OUTPATIENT
Start: 2024-07-23 | End: 2024-07-23

## 2024-07-23 RX ORDER — ALBUMIN HUMAN 250 G/1000ML
SOLUTION INTRAVENOUS
Status: DISCONTINUED | OUTPATIENT
Start: 2024-07-23 | End: 2024-07-23

## 2024-07-23 RX ORDER — ONDANSETRON 4 MG/1
4 TABLET, ORALLY DISINTEGRATING ORAL ONCE
Status: COMPLETED | OUTPATIENT
Start: 2024-07-23 | End: 2024-07-23

## 2024-07-23 RX ORDER — SODIUM CHLORIDE, SODIUM GLUCONATE, SODIUM ACETATE, POTASSIUM CHLORIDE AND MAGNESIUM CHLORIDE 30; 37; 368; 526; 502 MG/100ML; MG/100ML; MG/100ML; MG/100ML; MG/100ML
INJECTION, SOLUTION INTRAVENOUS CONTINUOUS
Status: DISCONTINUED | OUTPATIENT
Start: 2024-07-24 | End: 2024-07-24

## 2024-07-23 RX ORDER — MIDAZOLAM HYDROCHLORIDE 2 MG/2ML
INJECTION, SOLUTION INTRAMUSCULAR; INTRAVENOUS
Status: COMPLETED
Start: 2024-07-23 | End: 2024-07-23

## 2024-07-23 RX ORDER — METHOCARBAMOL 100 MG/ML
1000 INJECTION, SOLUTION INTRAMUSCULAR; INTRAVENOUS ONCE AS NEEDED
Status: COMPLETED | OUTPATIENT
Start: 2024-07-23 | End: 2024-07-23

## 2024-07-23 RX ORDER — DIPHENHYDRAMINE HYDROCHLORIDE 50 MG/ML
25 INJECTION INTRAMUSCULAR; INTRAVENOUS ONCE
Status: DISCONTINUED | OUTPATIENT
Start: 2024-07-24 | End: 2024-07-23 | Stop reason: HOSPADM

## 2024-07-23 RX ORDER — SODIUM CHLORIDE 9 MG/ML
INJECTION, SOLUTION INTRAVENOUS CONTINUOUS
Status: DISCONTINUED | OUTPATIENT
Start: 2024-07-23 | End: 2024-07-25 | Stop reason: HOSPADM

## 2024-07-23 RX ORDER — HEPARIN SODIUM 5000 [USP'U]/ML
INJECTION, SOLUTION INTRAVENOUS; SUBCUTANEOUS
Status: DISCONTINUED | OUTPATIENT
Start: 2024-07-23 | End: 2024-07-23 | Stop reason: HOSPADM

## 2024-07-23 RX ORDER — ENOXAPARIN SODIUM 100 MG/ML
30 INJECTION SUBCUTANEOUS ONCE
Status: COMPLETED | OUTPATIENT
Start: 2024-07-23 | End: 2024-07-23

## 2024-07-23 RX ORDER — DEXAMETHASONE SODIUM PHOSPHATE 4 MG/ML
INJECTION, SOLUTION INTRA-ARTICULAR; INTRALESIONAL; INTRAMUSCULAR; INTRAVENOUS; SOFT TISSUE
Status: DISCONTINUED | OUTPATIENT
Start: 2024-07-23 | End: 2024-07-23

## 2024-07-23 RX ORDER — CEFAZOLIN SODIUM 1 G/3ML
2 INJECTION, POWDER, FOR SOLUTION INTRAMUSCULAR; INTRAVENOUS
Status: COMPLETED | OUTPATIENT
Start: 2024-07-23 | End: 2024-07-23

## 2024-07-23 RX ORDER — ONDANSETRON 4 MG/1
4 TABLET, ORALLY DISINTEGRATING ORAL ONCE
OUTPATIENT
Start: 2024-07-23 | End: 2024-07-23

## 2024-07-23 RX ORDER — OXYCODONE AND ACETAMINOPHEN 5; 325 MG/1; MG/1
2 TABLET ORAL EVERY 4 HOURS PRN
Status: DISCONTINUED | OUTPATIENT
Start: 2024-07-23 | End: 2024-07-24

## 2024-07-23 RX ORDER — ACETAMINOPHEN 10 MG/ML
INJECTION, SOLUTION INTRAVENOUS
Status: DISCONTINUED | OUTPATIENT
Start: 2024-07-23 | End: 2024-07-23

## 2024-07-23 RX ORDER — HYDROMORPHONE HYDROCHLORIDE 2 MG/ML
0.4 INJECTION, SOLUTION INTRAMUSCULAR; INTRAVENOUS; SUBCUTANEOUS EVERY 5 MIN PRN
OUTPATIENT
Start: 2024-07-23

## 2024-07-23 RX ORDER — HYDROCODONE BITARTRATE AND ACETAMINOPHEN 5; 325 MG/1; MG/1
1 TABLET ORAL EVERY 4 HOURS PRN
Status: DISCONTINUED | OUTPATIENT
Start: 2024-07-24 | End: 2024-07-23

## 2024-07-23 RX ORDER — FENTANYL CITRATE 50 UG/ML
INJECTION, SOLUTION INTRAMUSCULAR; INTRAVENOUS
Status: DISCONTINUED | OUTPATIENT
Start: 2024-07-23 | End: 2024-07-23

## 2024-07-23 RX ORDER — INDOCYANINE GREEN AND WATER 25 MG
KIT INJECTION
Status: DISCONTINUED | OUTPATIENT
Start: 2024-07-23 | End: 2024-07-23

## 2024-07-23 RX ORDER — HYDROCODONE BITARTRATE AND ACETAMINOPHEN 5; 325 MG/1; MG/1
1 TABLET ORAL EVERY 4 HOURS PRN
OUTPATIENT
Start: 2024-07-23

## 2024-07-23 RX ORDER — ENOXAPARIN SODIUM 100 MG/ML
30 INJECTION SUBCUTANEOUS EVERY 24 HOURS
Status: DISCONTINUED | OUTPATIENT
Start: 2024-07-24 | End: 2024-07-24

## 2024-07-23 RX ORDER — METHYLENE BLUE 5 MG/ML
INJECTION INTRAVENOUS
Status: DISPENSED
Start: 2024-07-23 | End: 2024-07-24

## 2024-07-23 RX ORDER — GLUCAGON 1 MG
1 KIT INJECTION
OUTPATIENT
Start: 2024-07-23

## 2024-07-23 RX ORDER — LIDOCAINE HYDROCHLORIDE 10 MG/ML
1 INJECTION, SOLUTION EPIDURAL; INFILTRATION; INTRACAUDAL; PERINEURAL ONCE
Status: DISCONTINUED | OUTPATIENT
Start: 2024-07-24 | End: 2024-07-23 | Stop reason: HOSPADM

## 2024-07-23 RX ADMIN — HEPARIN SODIUM 5000 UNITS: 5000 INJECTION, SOLUTION INTRAVENOUS; SUBCUTANEOUS at 06:07

## 2024-07-23 RX ADMIN — CEFAZOLIN SODIUM 2 G: 2 SOLUTION INTRAVENOUS at 11:07

## 2024-07-23 RX ADMIN — ROCURONIUM BROMIDE 30 MG: 10 INJECTION, SOLUTION INTRAVENOUS at 08:07

## 2024-07-23 RX ADMIN — MIDAZOLAM HYDROCHLORIDE 2 MG: 2 INJECTION, SOLUTION INTRAMUSCULAR; INTRAVENOUS at 06:07

## 2024-07-23 RX ADMIN — SODIUM CHLORIDE, POTASSIUM CHLORIDE, SODIUM LACTATE AND CALCIUM CHLORIDE: 600; 310; 30; 20 INJECTION, SOLUTION INTRAVENOUS at 07:07

## 2024-07-23 RX ADMIN — ACETAMINOPHEN 1000 MG: 10 INJECTION, SOLUTION INTRAVENOUS at 07:07

## 2024-07-23 RX ADMIN — ROCURONIUM BROMIDE 30 MG: 10 INJECTION, SOLUTION INTRAVENOUS at 12:07

## 2024-07-23 RX ADMIN — INDOCYANINE GREEN AND WATER 7.5 MG: KIT at 12:07

## 2024-07-23 RX ADMIN — INDOCYANINE GREEN AND WATER 7.5 MG: KIT at 10:07

## 2024-07-23 RX ADMIN — ENOXAPARIN SODIUM 30 MG: 30 INJECTION SUBCUTANEOUS at 03:07

## 2024-07-23 RX ADMIN — ROCURONIUM BROMIDE 20 MG: 10 INJECTION, SOLUTION INTRAVENOUS at 10:07

## 2024-07-23 RX ADMIN — INDOCYANINE GREEN AND WATER 7.5 MG: KIT at 02:07

## 2024-07-23 RX ADMIN — FENTANYL CITRATE 100 MCG: 50 INJECTION, SOLUTION INTRAMUSCULAR; INTRAVENOUS at 07:07

## 2024-07-23 RX ADMIN — SODIUM CHLORIDE, POTASSIUM CHLORIDE, SODIUM LACTATE AND CALCIUM CHLORIDE: 600; 310; 30; 20 INJECTION, SOLUTION INTRAVENOUS at 11:07

## 2024-07-23 RX ADMIN — SODIUM CHLORIDE, POTASSIUM CHLORIDE, SODIUM LACTATE AND CALCIUM CHLORIDE: 600; 310; 30; 20 INJECTION, SOLUTION INTRAVENOUS at 09:07

## 2024-07-23 RX ADMIN — CEFAZOLIN 2 G: 330 INJECTION, POWDER, FOR SOLUTION INTRAMUSCULAR; INTRAVENOUS at 07:07

## 2024-07-23 RX ADMIN — ALBUMIN (HUMAN) 50 ML: 12.5 SOLUTION INTRAVENOUS at 09:07

## 2024-07-23 RX ADMIN — OXYCODONE HYDROCHLORIDE AND ACETAMINOPHEN 2 TABLET: 5; 325 TABLET ORAL at 09:07

## 2024-07-23 RX ADMIN — ONDANSETRON 100 MG: 2 INJECTION INTRAMUSCULAR; INTRAVENOUS at 12:07

## 2024-07-23 RX ADMIN — DEXAMETHASONE SODIUM PHOSPHATE 4 MG: 4 INJECTION, SOLUTION INTRA-ARTICULAR; INTRALESIONAL; INTRAMUSCULAR; INTRAVENOUS; SOFT TISSUE at 07:07

## 2024-07-23 RX ADMIN — ONDANSETRON 4 MG: 2 INJECTION INTRAMUSCULAR; INTRAVENOUS at 02:07

## 2024-07-23 RX ADMIN — HYDROMORPHONE HYDROCHLORIDE 0.4 MG: 2 INJECTION INTRAMUSCULAR; INTRAVENOUS; SUBCUTANEOUS at 03:07

## 2024-07-23 RX ADMIN — ROCURONIUM BROMIDE 30 MG: 10 INJECTION, SOLUTION INTRAVENOUS at 11:07

## 2024-07-23 RX ADMIN — MINERAL OIL AND WHITE PETROLATUM 1 G: 150; 830 OINTMENT OPHTHALMIC at 07:07

## 2024-07-23 RX ADMIN — SODIUM CHLORIDE, POTASSIUM CHLORIDE, SODIUM LACTATE AND CALCIUM CHLORIDE: 600; 310; 30; 20 INJECTION, SOLUTION INTRAVENOUS at 08:07

## 2024-07-23 RX ADMIN — ROCURONIUM BROMIDE 50 MG: 10 INJECTION, SOLUTION INTRAVENOUS at 07:07

## 2024-07-23 RX ADMIN — ONDANSETRON 4 MG: 2 INJECTION INTRAMUSCULAR; INTRAVENOUS at 07:07

## 2024-07-23 RX ADMIN — SUGAMMADEX 150 MG: 100 INJECTION, SOLUTION INTRAVENOUS at 02:07

## 2024-07-23 RX ADMIN — PROPOFOL 150 MG: 10 INJECTION, EMULSION INTRAVENOUS at 07:07

## 2024-07-23 RX ADMIN — ONDANSETRON 100 MG: 2 INJECTION INTRAMUSCULAR; INTRAVENOUS at 11:07

## 2024-07-23 RX ADMIN — MIDAZOLAM HYDROCHLORIDE 2 MG: 1 INJECTION, SOLUTION INTRAMUSCULAR; INTRAVENOUS at 06:07

## 2024-07-23 RX ADMIN — PROPOFOL 50 MG: 10 INJECTION, EMULSION INTRAVENOUS at 08:07

## 2024-07-23 RX ADMIN — LIDOCAINE HYDROCHLORIDE 50 MG: 10 INJECTION, SOLUTION EPIDURAL; INFILTRATION; INTRACAUDAL; PERINEURAL at 07:07

## 2024-07-23 RX ADMIN — ONDANSETRON 4 MG: 4 TABLET, ORALLY DISINTEGRATING ORAL at 06:07

## 2024-07-23 RX ADMIN — ACETAMINOPHEN 1000 MG: 10 INJECTION, SOLUTION INTRAVENOUS at 01:07

## 2024-07-23 RX ADMIN — SODIUM CHLORIDE, POTASSIUM CHLORIDE, SODIUM LACTATE AND CALCIUM CHLORIDE: 600; 310; 30; 20 INJECTION, SOLUTION INTRAVENOUS at 06:07

## 2024-07-23 RX ADMIN — METHOCARBAMOL 1000 MG: 100 INJECTION INTRAMUSCULAR; INTRAVENOUS at 03:07

## 2024-07-23 RX ADMIN — ROCURONIUM BROMIDE 20 MG: 10 INJECTION, SOLUTION INTRAVENOUS at 09:07

## 2024-07-23 RX ADMIN — SODIUM CHLORIDE: 9 INJECTION, SOLUTION INTRAVENOUS at 11:07

## 2024-07-23 RX ADMIN — ONDANSETRON 100 MG: 2 INJECTION INTRAMUSCULAR; INTRAVENOUS at 10:07

## 2024-07-23 RX ADMIN — ROCURONIUM BROMIDE 30 MG: 10 INJECTION, SOLUTION INTRAVENOUS at 10:07

## 2024-07-23 RX ADMIN — CEFAZOLIN 1 G: 330 INJECTION, POWDER, FOR SOLUTION INTRAMUSCULAR; INTRAVENOUS at 03:07

## 2024-07-23 RX ADMIN — SODIUM CHLORIDE, POTASSIUM CHLORIDE, SODIUM LACTATE AND CALCIUM CHLORIDE: 600; 310; 30; 20 INJECTION, SOLUTION INTRAVENOUS at 12:07

## 2024-07-23 RX ADMIN — CEFAZOLIN 1 G: 330 INJECTION, POWDER, FOR SOLUTION INTRAMUSCULAR; INTRAVENOUS at 11:07

## 2024-07-23 NOTE — PLAN OF CARE
Pt is [id number] 10-she meets criteria for phase2 care per dr hank alvarez-to rm 1 via bed

## 2024-07-23 NOTE — ANESTHESIA PREPROCEDURE EVALUATION
07/22/2024  Brenden Franklin PatriciaEwelina is a 54 y.o., female.  Procedure Information    Case: [id number] Date/Time: 07/23/24 0700   Procedures:      RECONSTRUCTION, BREAST, USING KENNETH SKIN FLAP (Bilateral breast recon with KENNETH flaps, possible TRAM flaps, possible ventral hernia with mesh, possible rectus muscle plication, use of intraoperative fluoroscein angiography) (Bilateral: Breast)      INSERTION, BREAST IMPLANT (possible placement of bilateral breast implants) (Bilateral: Breast)   Anesthesia type: General   Diagnosis:      Encounter for breast reconstruction following mastectomy [Z42.1]      Personal history of malignant neoplasm of breast [Z85.3]   Pre-op diagnosis:      Encounter for breast reconstruction following mastectomy [Z42.1]      Personal history of malignant neoplasm of breast [Z85.3]   Location: [facility] OR [address] [place] [address] OR   Surgeons: Ubaldo David MD       Pre-op Assessment    I have reviewed the Patient Summary Reports.     I have reviewed the Nursing Notes. I have reviewed the NPO Status.   I have reviewed the Medications.     Review of Systems  Anesthesia Hx:  No problems with previous Anesthesia                Hematology/Oncology:  Hematology Normal   Oncology Normal                                   EENT/Dental:  EENT/Dental Normal           Cardiovascular:  Exercise tolerance: good   Hypertension  Past MI CAD  asymptomatic              Functional Capacity good / => 4 METS                         Pulmonary:        Sleep Apnea                Renal/:   Denies Chronic Renal Disease.                Hepatic/GI:     GERD             Musculoskeletal:  Arthritis               Neurological:  Neurology Normal                                      Endocrine:  Diabetes         Denies Morbid Obesity / BMI > 40  Dermatological:  Skin Normal    Psych:   anxiety                 Physical Exam  General:  Alert, Oriented, Well nourished and Cooperative    Airway:  Mallampati: II   Mouth Opening: Normal  TM Distance: Normal  Tongue: Normal  Neck ROM: Normal ROM    Dental:  Intact  Chipped upper central incisor as marked  Chest/Lungs:  Clear to auscultation, Normal Respiratory Rate    Heart:  Rate: Normal  Rhythm: Regular Rhythm       Latest Reference Range & Units 06/24/24 13:25   WBC 4.50 - 11.50 x10(3)/mcL 6.83   RBC 4.20 - 5.40 x10(6)/mcL 4.64   Hemoglobin 12.0 - 16.0 g/dL 13.2   Hematocrit 37.0 - 47.0 % 40.3   MCV 80.0 - 94.0 fL 86.9   MCH 27.0 - 31.0 pg 28.4   MCHC 33.0 - 36.0 g/dL 32.8 (L)   RDW 11.5 - 17.0 % 18.7 (H)   Platelet Count 130 - 400 x10(3)/mcL 351   MPV 7.4 - 10.4 fL 10.9 (H)   Neut % % 50.4   LYMPH % % 43.3   Mono % % 5.4   Eos % % 0.6   Basophil % % 0.3   Immature Granulocytes % 0.0   Neut # 2.1 - 9.2 x10(3)/mcL 3.44   Lymph # 0.6 - 4.6 x10(3)/mcL 2.96   Mono # 0.1 - 1.3 x10(3)/mcL 0.37   Eos # 0 - 0.9 x10(3)/mcL 0.04   Baso # <=0.2 x10(3)/mcL 0.02   Immature Grans (Abs) 0 - 0.04 x10(3)/mcL 0.00   Sodium 136 - 145 mmol/L 140   Potassium 3.5 - 5.1 mmol/L 4.5   Chloride 98 - 107 mmol/L 104   CO2 22 - 29 mmol/L 27   Anion Gap mEq/L 9.0   BUN 9.8 - 20.1 mg/dL 9.7 (L)   Creatinine 0.55 - 1.02 mg/dL 0.74   BUN/CREAT RATIO  13   eGFR mL/min/1.73/m2 >60   Glucose 74 - 100 mg/dL 82   Calcium 8.4 - 10.2 mg/dL 9.8   ALP 40 - 150 unit/L 101   PROTEIN TOTAL 6.4 - 8.3 gm/dL 7.3   Albumin 3.5 - 5.0 g/dL 3.8   Albumin/Globulin Ratio 1.1 - 2.0 ratio 1.1   BILIRUBIN TOTAL <=1.5 mg/dL 0.2   AST 5 - 34 unit/L 18   ALT 0 - 55 unit/L 12   Globulin, Total 2.4 - 3.5 gm/dL 3.5   (L): Data is abnormally low  (H): Data is abnormally high  est Reason : C50.411,Z17.0,    Vent. Rate : 070 BPM     Atrial Rate : 070 BPM     P-R Int : 158 ms          QRS Dur : 082 ms      QT Int : 366 ms       P-R-T Axes : 067 049 033 degrees     QTc Int : 395 ms    Normal sinus rhythm with sinus arrhythmia  Normal ECG    Confirmed by Connie EDGE,  Clint ([id number]) on 3/13/2024 7:33:00 PM    Referred By: NIECY SANTANA           Confirmed By:Clint Rosales MD      Specimen Collected: 03/12/24 10:34 CDT Last Resulted: 03/13/24 19:33 CDT                    Anesthesia Plan  Type of Anesthesia, risks & benefits discussed:    Anesthesia Type: Gen ETT  Intra-op Monitoring Plan: Standard ASA Monitors  Post Op Pain Control Plan: multimodal analgesia  Induction:  IV and Inhalation  Airway Plan: Direct  Informed Consent: Informed consent signed with the Patient and all parties understand the risks and agree with anesthesia plan.  All questions answered. Patient consented to blood products? Yes  ASA Score: 3  Day of Surgery Review of History & Physical: H&P Update referred to the surgeon/provider.    Ready For Surgery From Anesthesia Perspective.     .

## 2024-07-23 NOTE — TRANSFER OF CARE
"Anesthesia Transfer of Care Note    Patient: Brenden Franklin Parisa    Procedure(s) Performed: Procedure(s) (LRB):  RECONSTRUCTION, BREAST, USING KENNETH SKIN FLAP (Bilateral breast recon with KENNETH flaps, possible TRAM flaps, possible ventral hernia with mesh, possible rectus muscle plication, use of intraoperative fluoroscein angiography) (Bilateral)  REMOVAL, TISSUE EXPANDER (Bilateral)    Patient location: PACU    Anesthesia Type: general    Transport from OR: Transported from OR on room air with adequate spontaneous ventilation    Post pain: adequate analgesia    Post assessment: no apparent anesthetic complications    Post vital signs: stable    Level of consciousness: responds to stimulation    Nausea/Vomiting: no nausea/vomiting    Complications: none    Transfer of care protocol was followed      Last vitals: Visit Vitals  /78   Pulse (!) 57   Temp 36.6 °C (97.9 °F) (Oral)   Resp 18   Ht 5' 4" (1.626 m)   Wt 82.2 kg (181 lb 3.5 oz)   SpO2 98%   Breastfeeding No   BMI 31.11 kg/m²     "

## 2024-07-23 NOTE — ANESTHESIA PROCEDURE NOTES
Intubation    Date/Time: 7/23/2024 7:19 AM    Performed by: Margret Rutherford CRNA  Authorized by: Jw Aguilar MD    Intubation:     Induction:  Intravenous    Intubated:  Postinduction    Mask Ventilation:  Easy mask    Attempts:  1    Attempted By:  Staff anesthesiologist    Method of Intubation:  Video laryngoscopy    Blade:  Kirby 3    Laryngeal View Grade: Grade I - full view of cords      Difficult Airway Encountered?: No      Complications:  None    Airway Device:  Oral endotracheal tube    Airway Device Size:  7.0    Style/Cuff Inflation:  Cuffed (inflated to minimal occlusive pressure)    Inflation Amount (mL):  3    Tube secured:  21    Secured at:  The lips    Placement Verified By:  Capnometry    Complicating Factors:  None    Findings Post-Intubation:  BS equal bilateral

## 2024-07-23 NOTE — OP NOTE
SURGEON:  Soy Ash MD  ASSISTANT SURGEON:  Boby David MD     PREOPERATIVE DIAGNOSIS:  Breast cancer and bilateral acquired amastia     POSTOPERATIVE DIAGNOSIS:  Breast cancer and bilateral acquired amastia     PROCEDURE PERFORMED:   1.      Left sided breast reconstruction with deep inferior epigastric artery  flap from left hemiabdomen (CPT code -RR)  2.      Left partial third rib resection (CPT code 37709- 59)  3.      Assistant surgeon to right sided breast reconstruction with deep inferior epigastric artery  flap from right hemiabdomen (CPT code -DT)  4. Removal of left sided tissue expander and circumferential capsulotomy       INDICATIONS FOR PROCEDURE:  This is a very pleasant female who has a history of breast cancer, she was treated with bilateral mastectomies  She presents for bilateral breast reconstruction revision, now using abdominal-based tissue. We discussed the options in detail with the patient and she elected to proceed with bilateral reconstruction using deep inferior epigastric artery  flaps from her abdominal wall.  We discussed in detail the risks, benefits, and potential complications of surgery including wound healing complications, flap loss, the need for additional surgery, infection, and other related complications.  The patient signed the informed consent.     FINDINGS: The left breast was reconstructed with a flap from the left  hemiabdomen. In addition, please note that the right breast was reconstructed with a flap from the right hemiabdomen, which was performed by Dr. Boby David working in tandem. Please see his separate operative note for complete details of his portion of the procedure. I was first assistant to his portion of the case. Due to the complex nature of the operation it was necessary to use two skilled microsurgeons.     ANESTHESIA:  General.     COMPLICATIONS:  None.     DRAINS: 15 Guamanian drains at each surgical site      SPECIMENS:  Right and left breast tissue sent to pathology     CONDITION: Stable     DISPOSTION: To PACU then admission        PROCEDURE IN DETAIL: Dr. David and I worked in tandem, due to the complex nature of the operation; this required the use of two operating microsurgeons.      I first opened the previous mastectomy incision on left and removed the old tissue expander.  I then performed a circumferential and radial capsulotomy. The pocket was undermined to clavicle and made large enough to allow final insetting of flaps.     I then turned my attention to the left hemiabdomen.  I made an incision elliptically, first inferiorly and then superiorly, approximately 2 cm above the umbilicus, to capture all the perforating vessels. I made an incision using the 10 blade scalpel down the midline to split the abdominal flaps in half.  The umbilicus was isolated using single hooks, a 15 blade scalpel followed by curves gotti scissors.  Once the umbilicus was free of the abdominal flaps, I began my dissection medial to lateral and lateral to medial.  I identified the dominant perforators.   This was meticulously dissected circumferentially to the level of the fascia.  The fascia was then split longitudinally and the  traced through the rectus muscle.  I used a combination of sharp and blunt dissection, as well as clips and the bipolar cautery, being a careful not to sacrifice any additional fascia or muscle, for a true  flap.  The pedicle was traced down to the origin of the inferior epigastric artery meticulously. Once the dissection was carried out to this point, I stopped and stapled the flap back up into place, and then turned my attention to the chest.      I next turned my attention to the left chest, after elevating the pectoralis major muscle off the rib, the perichondrium was elevated and a rongeur was used to remove the cartilaginous medial portion of the 3rd rib. Intercostal muscles were  then removed using bipolar cautery, being careful not to injure the perforating vessels up from the RAKAN. Once the rib was removed and the intercostal muscle was removed, I incised the posterior perichondrium.  This was incised sharply and blunt dissection was carried out lateral to medial.  RAKAN and IMV were identified in their entirety.  These were dissected free and the branches were clipped and divided.       I then de-epithelialized the flap appropriately, leaving a medial skin paddle for later monitoring.     I then began the microvascular portion of procedure.  I first began by utilizing the left roberto abdomen to reconstruct the left breast.  I brought in the operating room microscope and finished preparing the IMV and RAKAN.  I then clipped and divided the inferior epigastric artery and vein in the left  hemiabdomen and brought the flap up into the right chest.  The flap artery was flushed with heparinized saline until the veins ran clear.  I placed Acland clamps on the RAKAN, IMV and clips and divided them distally.  Using a double Acland clamp, I anastomosed the RAKAN to the artery of the flap using interrupted 9-0 nylon in interrupted fashion.  After this was completed, a an appropriately sized  was chosen and I coupled the vein in end-to-end fashion.  Once the Acland clamps were released, the artery hand a strong pulse and the veins ran easily. WE placed an internal dopper.     We then utilized the right roberto abdomen to reconstruct the right breast.  Please see Dr. David's operative note for complete details of this portion of the procedure.        The flaps were inset using interrupted 0 vicryl suture followed by running 3-0 monocryl, after a 15-Togolese round Janes drains were left in the operative bed. Exparel was injected throughout the chest and the abdomen.      We then turned our attention to the abdomen, using 0 PDS to close both fascial incisions. We then irrigated out the entire abdomen with sterile  saline solution, placed two 15-Palauan round Janes drains, closed the deep tissues of the abdomen using 0 Vicryl including Olivia's fascia.  Skin was closed using running 2-0 stratafix.  Umbilicus was brought up through an elliptical incision and inset suing 3-0 monocryl. There were no complications. I was present for the entire procedure.

## 2024-07-23 NOTE — PROGRESS NOTES
PRS  She is doing well without complaints after surgery   Afvss  Both flaps are warm with good signals  Continue q1 hour flap checks.

## 2024-07-24 PROCEDURE — 25000003 PHARM REV CODE 250: Performed by: SURGERY

## 2024-07-24 PROCEDURE — 99024 POSTOP FOLLOW-UP VISIT: CPT | Mod: ,,, | Performed by: SURGERY

## 2024-07-24 PROCEDURE — 25000003 PHARM REV CODE 250

## 2024-07-24 PROCEDURE — 63600175 PHARM REV CODE 636 W HCPCS: Performed by: SURGERY

## 2024-07-24 PROCEDURE — 63600175 PHARM REV CODE 636 W HCPCS: Performed by: ANESTHESIOLOGY

## 2024-07-24 PROCEDURE — 11000001 HC ACUTE MED/SURG PRIVATE ROOM

## 2024-07-24 RX ORDER — DOCUSATE SODIUM 100 MG/1
100 CAPSULE, LIQUID FILLED ORAL DAILY
Status: DISCONTINUED | OUTPATIENT
Start: 2024-07-25 | End: 2024-07-24

## 2024-07-24 RX ORDER — IBUPROFEN 200 MG
24 TABLET ORAL
Status: DISCONTINUED | OUTPATIENT
Start: 2024-07-24 | End: 2024-07-25 | Stop reason: HOSPADM

## 2024-07-24 RX ORDER — HYDROCODONE BITARTRATE AND ACETAMINOPHEN 10; 325 MG/1; MG/1
1 TABLET ORAL EVERY 6 HOURS PRN
Status: DISCONTINUED | OUTPATIENT
Start: 2024-07-24 | End: 2024-07-24

## 2024-07-24 RX ORDER — IBUPROFEN 200 MG
16 TABLET ORAL
Status: DISCONTINUED | OUTPATIENT
Start: 2024-07-24 | End: 2024-07-25 | Stop reason: HOSPADM

## 2024-07-24 RX ORDER — ENOXAPARIN SODIUM 100 MG/ML
30 INJECTION SUBCUTANEOUS EVERY 24 HOURS
Status: DISCONTINUED | OUTPATIENT
Start: 2024-07-24 | End: 2024-07-25 | Stop reason: HOSPADM

## 2024-07-24 RX ORDER — CEPHALEXIN 500 MG/1
500 CAPSULE ORAL EVERY 6 HOURS
Status: DISCONTINUED | OUTPATIENT
Start: 2024-07-24 | End: 2024-07-25 | Stop reason: HOSPADM

## 2024-07-24 RX ORDER — BUSPIRONE HYDROCHLORIDE 15 MG/1
30 TABLET ORAL 2 TIMES DAILY
Status: DISCONTINUED | OUTPATIENT
Start: 2024-07-24 | End: 2024-07-25 | Stop reason: HOSPADM

## 2024-07-24 RX ORDER — ASPIRIN 81 MG/1
81 TABLET ORAL DAILY
Status: DISCONTINUED | OUTPATIENT
Start: 2024-07-24 | End: 2024-07-25 | Stop reason: HOSPADM

## 2024-07-24 RX ORDER — OXYCODONE AND ACETAMINOPHEN 10; 325 MG/1; MG/1
1 TABLET ORAL EVERY 4 HOURS PRN
Status: DISCONTINUED | OUTPATIENT
Start: 2024-07-24 | End: 2024-07-25 | Stop reason: HOSPADM

## 2024-07-24 RX ORDER — DOCUSATE SODIUM 100 MG/1
100 CAPSULE, LIQUID FILLED ORAL DAILY
Status: DISCONTINUED | OUTPATIENT
Start: 2024-07-24 | End: 2024-07-25 | Stop reason: HOSPADM

## 2024-07-24 RX ORDER — PANTOPRAZOLE SODIUM 40 MG/1
40 TABLET, DELAYED RELEASE ORAL 2 TIMES DAILY
Status: DISCONTINUED | OUTPATIENT
Start: 2024-07-24 | End: 2024-07-25 | Stop reason: HOSPADM

## 2024-07-24 RX ORDER — DOCUSATE SODIUM 50 MG/5ML
50 LIQUID ORAL DAILY
Status: DISCONTINUED | OUTPATIENT
Start: 2024-07-24 | End: 2024-07-24

## 2024-07-24 RX ORDER — GLUCAGON 1 MG
1 KIT INJECTION
Status: DISCONTINUED | OUTPATIENT
Start: 2024-07-24 | End: 2024-07-25 | Stop reason: HOSPADM

## 2024-07-24 RX ORDER — METFORMIN HYDROCHLORIDE 500 MG/1
500 TABLET ORAL 2 TIMES DAILY WITH MEALS
Status: DISCONTINUED | OUTPATIENT
Start: 2024-07-24 | End: 2024-07-25 | Stop reason: HOSPADM

## 2024-07-24 RX ORDER — CARVEDILOL 3.12 MG/1
3.12 TABLET ORAL 2 TIMES DAILY
Status: DISCONTINUED | OUTPATIENT
Start: 2024-07-24 | End: 2024-07-25 | Stop reason: HOSPADM

## 2024-07-24 RX ADMIN — METFORMIN HYDROCHLORIDE 500 MG: 500 TABLET, FILM COATED ORAL at 05:07

## 2024-07-24 RX ADMIN — SODIUM CHLORIDE: 9 INJECTION, SOLUTION INTRAVENOUS at 09:07

## 2024-07-24 RX ADMIN — CYCLOBENZAPRINE 10 MG: 10 TABLET, FILM COATED ORAL at 11:07

## 2024-07-24 RX ADMIN — CEPHALEXIN 500 MG: 500 CAPSULE ORAL at 05:07

## 2024-07-24 RX ADMIN — HYDROCODONE BITARTRATE AND ACETAMINOPHEN 1 TABLET: 10; 325 TABLET ORAL at 03:07

## 2024-07-24 RX ADMIN — HYDROCODONE BITARTRATE AND ACETAMINOPHEN 1 TABLET: 10; 325 TABLET ORAL at 10:07

## 2024-07-24 RX ADMIN — ENOXAPARIN SODIUM 30 MG: 30 INJECTION SUBCUTANEOUS at 10:07

## 2024-07-24 RX ADMIN — OXYCODONE HYDROCHLORIDE AND ACETAMINOPHEN 2 TABLET: 5; 325 TABLET ORAL at 05:07

## 2024-07-24 RX ADMIN — CEPHALEXIN 500 MG: 500 CAPSULE ORAL at 11:07

## 2024-07-24 RX ADMIN — OXYCODONE AND ACETAMINOPHEN 1 TABLET: 10; 325 TABLET ORAL at 11:07

## 2024-07-24 RX ADMIN — DOCUSATE SODIUM 100 MG: 100 CAPSULE, LIQUID FILLED ORAL at 03:07

## 2024-07-24 RX ADMIN — CEPHALEXIN 500 MG: 500 CAPSULE ORAL at 12:07

## 2024-07-24 RX ADMIN — CYCLOBENZAPRINE 10 MG: 10 TABLET, FILM COATED ORAL at 03:07

## 2024-07-24 RX ADMIN — CYCLOBENZAPRINE 10 MG: 10 TABLET, FILM COATED ORAL at 09:07

## 2024-07-24 RX ADMIN — CEFAZOLIN SODIUM 2 G: 2 SOLUTION INTRAVENOUS at 06:07

## 2024-07-24 NOTE — ANESTHESIA POSTPROCEDURE EVALUATION
Anesthesia Post Evaluation    Patient: Brenden Franklin Parisa    Procedure(s) Performed: Procedure(s) (LRB):  RECONSTRUCTION, BREAST, USING KENNETH SKIN FLAP (Bilateral breast recon with KENNETH flaps, possible TRAM flaps, possible ventral hernia with mesh, possible rectus muscle plication, use of intraoperative fluoroscein angiography) (Bilateral)  REMOVAL, TISSUE EXPANDER (Bilateral)    Final Anesthesia Type: general      Patient location during evaluation: PACU  Patient participation: Yes- Able to Participate  Level of consciousness: awake and alert and oriented  Post-procedure vital signs: reviewed and stable  Pain management: adequate  Airway patency: patent  PIETER mitigation strategies: Verification of full reversal of neuromuscular block  PONV status at discharge: No PONV  Anesthetic complications: no      Cardiovascular status: blood pressure returned to baseline and stable  Respiratory status: spontaneous ventilation and unassisted  Hydration status: euvolemic  Follow-up not needed.  Comments: [facility]              Vitals Value Taken Time   /79 07/23/24 1729   Temp 36.6 °C (97.9 °F) 07/23/24 1620   Pulse 67 07/23/24 1729   Resp 16 07/23/24 1620   SpO2 98 % 07/23/24 1729         Event Time   Out of Recovery 16:20:00         Pain/Zohaib Score: Pain Rating Prior to Med Admin: 0 (7/24/2024  5:19 AM)  Pain Rating Post Med Admin: 2 (7/23/2024 10:00 PM)  Zohaib Score: 10 (7/23/2024  4:25 PM)

## 2024-07-24 NOTE — PROGRESS NOTES
PRS    AF HDS. No acute complaints today. Surgical dressings are CDI. Drains are thin and serosanguinous. Breast flaps are warm to the touch and appropriate color bilaterally with dopplerable pulses.      A/P:  - Discontinue Mir catheter today  - Compression bra & binder with activity  - Q2H flap vascular checks        Jerry Nicole MD  General Surgery PGY-1  [facility]

## 2024-07-24 NOTE — OP NOTE
SURGEON:  Ubaldo David MD     ASSISTANT SURGEON:  Soy Ash MD     PREOPERATIVE DIAGNOSIS:  Breast cancer and bilateral acquired amastia        POSTOPERATIVE DIAGNOSIS:  Breast cancer and bilateral acquired amastia     PROCEDURE PERFORMED:      1.     Right sided breast reconstruction with deep inferior epigastric artery  flap from Right hemiabdomen (CPT code -TY)     2.      Right partial third rib resection (CPT code 67305- 59)     3.      Assistant surgeon to left sided breast reconstruction with deep inferior epigastric artery  flap from left hemiabdomen (CPT code -DW)     4.      Intraoperative laser angiography using indocyanine green (CPT Codes 76753 and 80403)    5. Removal of right breast tissue exapnder    6. Right breast capsulotomy                 INDICATIONS FOR PROCEDURE:  This is a very pleasant female who has a history of breast cancer, she was treated with bilateral mastectomies  She presents for bilateral breast reconstruction revision, now using abdominal-based tissue. We discussed the options in detail with the patient and she elected to proceed with bilateral reconstruction using deep inferior epigastric artery  flaps from her abdominal wall.  We discussed in detail the risks, benefits, and potential complications of surgery including wound healing complications, flap loss, the need for additional surgery, infection, and other related complications.  The patient signed the informed consent.           FINDINGS: The right breast was reconstructed with a flap from the right hemiabdomen. In addition, please note that the left breast was reconstructed with a flap from the left hemiabdomen, which was performed by Dr. Ash working in tandem. Please see his separate operative note for complete details of his portion of the procedure. I was first assistant to his portion of the case. Due to the complex nature of the operation it was necessary to use two  "skilled microsurgeons.           ANESTHESIA:  General.     COMPLICATIONS:  None.     DRAINS: 15 Hebrew drains at each surgical site     SPECIMENS:  Right and left breast tissue sent to pathology     CONDITION: Stable     DISPOSTION: To PACU then admission        PROCEDURE IN DETAIL: Dr. Ash and I worked in tandem, due to the complex nature of the operation; this required the use of two operating microsurgeons.            I first opened the previous right mastectomy pocket making an elliptical incision around the old scar and sent the skin for pathology. The pocket was undermined to clavicle and made large enough to allow final insetting of flaps. Please note she had a previous wise pattern mastectomy in addition to her mastectomy which was done through the nipple.  This resulted in a "I" shaped incision that we removed.  We also removed the previous tissue expanders and completed capsulotomy using cautery on each side.           I then turned my attention to the right hemiabdomen.  I made an incision elliptically, first inferiorly and then superiorly, approximately 2 cm above the umbilicus, to capture all the perforating vessels.  I first began by elevating the right roberto abdomen.  I made an incision using the 10 blade scalpel down the midline to split the abdominal flaps in half.  The umbilicus was isolated using single hooks, a 15 blade scalpel followed by curves gotti scissors.  Once the umbilicus was free of the abdominal flaps, I began my dissection medial to lateral and lateral to medial.  I identified the dominant perforators.   This was meticulously dissected circumferentially to the level of the fascia.  The fascia was then split longitudinally and the  traced through the rectus muscle.  I used a combination of sharp and blunt dissection, as well as clips and the bipolar cautery, being a careful not to sacrifice any additional fascia or muscle, for a true  flap.  The pedicle was " traced down to the origin of the inferior epigastric artery meticulously. Once the dissection was carried out to this point, I stopped and stapled the flap back up into place, and then turned my attention to the chest.            I next turned my attention to the right chest, and removed the cartilage from the most medial portion of the 3rd rib.  After elevating the pectoralis major muscle off the rib, the perichondrium was elevated and a rongeur was used to remove the rib, intercostal muscles were then removed using bipolar cautery, being careful not to injure the perforating vessels up from the RAKAN.  Once the rib was removed and the intercostal muscle was removed, I incised the posterior the perichondrium.  This was incised sharply and blunt dissection was carried out lateral to medial.  RAKAN and IMV were identified in their entirety.  These were dissected free and the branches were clipped and divided.             Intraoperative laser angiography was then carried out using indocyanine green.  We injected fluorescein into the patients vein, and then we used the system to evaluate the perfusion to the bilateral flaps as well as the bilateral mastectomy flaps.  Everything appeared to have excellent perfusion.   We then de-epithelialized the flaps appropriately, leaving a medial skin paddle for later monitoring.           I then began the microvascular portion of procedure.  I first began by utilizing the right roberto abdomen to reconstruct the right breast.  I brought in the operating room microscope and finished preparing the IMV and RAKAN.  I then clipped and divided the inferior epigastric artery and vein in the left  hemiabdomen and brought the flap up into the left chest.  The flap artery was flushed with heparinized saline until the veins ran clear.  I placed Acland clamps on the RAKAN, IMV and clips and divided them distally.  Using a double Acland clamp, I anastomosed the RAKAN to the artery of the flap using  interrupted 9-0 nylon in interrupted fashion.  After this was completed, a an appropriately sized  was chosen and I coupled the vein in end-to-end fashion. Two veins were anastomosed both 2.5mm couplers.  Once the Acland clamps were released, the artery hand a strong pulse and the veins ran easily.           We then utilized the left roberto abdomen to reconstruct the left breast.  Please see Dr. Hearn operative note for complete details of this portion of the procedure.              The flaps were inset using interrupted 0 vicryl suture followed by running 3-0 monocryl, after a 15-Ku[place]i round Janes drains were left in the operative bed.            We then turned our attention to the abdomen, using 0 PDS to close both fascial incisions. Rectus muscle was plicated with 1-0 looped PDS suture. We then irrigated out the entire abdomen with sterile saline solution, placed two 15-[place] round Janes drains, closed the deep tissues of the abdomen including Olivia fascia using interrupted 0 Vicryl suture, closed the skin using a running 3-0 monocryl.  An elliptical incision was made for the umbilicus, and this was inset using interrupted 3-0 monocryl.                  We injected Exparel throughout the abdomen and chest wounds.  There was a strong Doppler signal in both flaps.  The flaps were pink and bled at the end of the case.  Sterile dressing was applied.  No complications.  I was scrubbed and present for the entire procedure.

## 2024-07-25 VITALS
HEIGHT: 64 IN | OXYGEN SATURATION: 95 % | DIASTOLIC BLOOD PRESSURE: 68 MMHG | SYSTOLIC BLOOD PRESSURE: 103 MMHG | WEIGHT: 181.19 LBS | TEMPERATURE: 99 F | RESPIRATION RATE: 20 BRPM | BODY MASS INDEX: 30.93 KG/M2 | HEART RATE: 99 BPM

## 2024-07-25 PROBLEM — Z90.13 H/O BILATERAL MASTECTOMY: Status: ACTIVE | Noted: 2024-07-25

## 2024-07-25 LAB — PSYCHE PATHOLOGY RESULT: NORMAL

## 2024-07-25 PROCEDURE — 25000003 PHARM REV CODE 250

## 2024-07-25 PROCEDURE — 63600175 PHARM REV CODE 636 W HCPCS: Performed by: SURGERY

## 2024-07-25 PROCEDURE — 25000003 PHARM REV CODE 250: Performed by: SURGERY

## 2024-07-25 PROCEDURE — 99024 POSTOP FOLLOW-UP VISIT: CPT | Mod: ,,, | Performed by: SURGERY

## 2024-07-25 RX ADMIN — CYCLOBENZAPRINE 10 MG: 10 TABLET, FILM COATED ORAL at 06:07

## 2024-07-25 RX ADMIN — ENOXAPARIN SODIUM 30 MG: 30 INJECTION SUBCUTANEOUS at 08:07

## 2024-07-25 RX ADMIN — OXYCODONE AND ACETAMINOPHEN 1 TABLET: 10; 325 TABLET ORAL at 06:07

## 2024-07-25 RX ADMIN — DOCUSATE SODIUM 100 MG: 100 CAPSULE, LIQUID FILLED ORAL at 08:07

## 2024-07-25 RX ADMIN — CEPHALEXIN 500 MG: 500 CAPSULE ORAL at 06:07

## 2024-07-25 NOTE — PROGRESS NOTES
PRS     Patient doing very well this morning POD # 2 after KENNETH flap recon.   AFVSS.   Drains SS/thin.   Breast flaps are warm, well perfused with strong doppler signals.   Incisions to breasts and abdomen C/D/I.     A/P:   Patient stable for discharge home.   She has received rx.   Lovenox 30 this morning prior to discharge (start home dose of lovenox tomorrow).  Drain care teaching.   If out of bed continue abdominal binder and surgical bra.   OK to shower do not scrub surgical incisions.   Follow up Monday.

## 2024-07-25 NOTE — DISCHARGE SUMMARY
[facility] - Med Surg  Discharge Note  Short Stay    Procedure(s) (LRB):  RECONSTRUCTION, BREAST, USING KENNETH SKIN FLAP (Bilateral breast recon with KENNETH flaps, possible TRAM flaps, possible ventral hernia with mesh, possible rectus muscle plication, use of intraoperative fluoroscein angiography) (Bilateral)  REMOVAL, TISSUE EXPANDER (Bilateral)      OUTCOME: Patient tolerated treatment/procedure well without complication and is now ready for discharge.    DISPOSITION: Home or Self Care    FINAL DIAGNOSIS:  H/O bilateral mastectomy    FOLLOWUP: In clinic    DISCHARGE INSTRUCTIONS:  No discharge procedures on file.     TIME SPENT ON DISCHARGE: 5 minutes

## 2024-07-26 PROCEDURE — G0180 MD CERTIFICATION HHA PATIENT: HCPCS | Mod: ,,, | Performed by: SURGERY

## 2024-08-06 ENCOUNTER — OFFICE VISIT (OUTPATIENT)
Dept: HEMATOLOGY/ONCOLOGY | Facility: CLINIC | Age: 54
End: 2024-08-06
Payer: OTHER GOVERNMENT

## 2024-08-06 DIAGNOSIS — F32.A DEPRESSION, UNSPECIFIED DEPRESSION TYPE: Primary | ICD-10-CM

## 2024-08-06 PROCEDURE — 90834 PSYTX W PT 45 MINUTES: CPT | Mod: 95,,, | Performed by: SOCIAL WORKER

## 2024-08-12 ENCOUNTER — HOSPITAL ENCOUNTER (EMERGENCY)
Facility: HOSPITAL | Age: 54
Discharge: HOME | End: 2024-08-12
Attending: EMERGENCY MEDICINE
Payer: OTHER GOVERNMENT

## 2024-08-12 VITALS
TEMPERATURE: 98 F | HEART RATE: 82 BPM | RESPIRATION RATE: 18 BRPM | HEIGHT: 64 IN | WEIGHT: 180.56 LBS | DIASTOLIC BLOOD PRESSURE: 88 MMHG | SYSTOLIC BLOOD PRESSURE: 143 MMHG | OXYGEN SATURATION: 97 % | BODY MASS INDEX: 30.83 KG/M2

## 2024-08-12 DIAGNOSIS — G51.0 BELL'S PALSY: Primary | ICD-10-CM

## 2024-08-12 LAB
ALBUMIN SERPL-MCNC: 2.9 G/DL (ref 3.5–5)
ALBUMIN/GLOB SERPL: 0.7 RATIO (ref 1.1–2)
ALP SERPL-CCNC: 110 UNIT/L (ref 40–150)
ALT SERPL-CCNC: 13 UNIT/L (ref 0–55)
ANION GAP SERPL CALC-SCNC: 11 MEQ/L
ANION GAP SERPL CALC-SCNC: 14 MMOL/L (ref 8–16)
APTT PPP: 34.4 SECONDS (ref 23.2–33.7)
AST SERPL-CCNC: 18 UNIT/L (ref 5–34)
BASOPHILS # BLD AUTO: 0.02 X10(3)/MCL
BASOPHILS NFR BLD AUTO: 0.3 %
BILIRUB SERPL-MCNC: 0.2 MG/DL
BUN SERPL-MCNC: 7 MG/DL (ref 6–30)
BUN SERPL-MCNC: 7.6 MG/DL (ref 9.8–20.1)
CALCIUM SERPL-MCNC: 9.3 MG/DL (ref 8.4–10.2)
CHLORIDE SERPL-SCNC: 102 MMOL/L (ref 95–110)
CHLORIDE SERPL-SCNC: 104 MMOL/L (ref 98–107)
CO2 SERPL-SCNC: 25 MMOL/L (ref 22–29)
CREAT SERPL-MCNC: 0.7 MG/DL (ref 0.5–1.4)
CREAT SERPL-MCNC: 0.71 MG/DL (ref 0.55–1.02)
CREAT/UREA NIT SERPL: 11
EOSINOPHIL # BLD AUTO: 0.09 X10(3)/MCL (ref 0–0.9)
EOSINOPHIL NFR BLD AUTO: 1.2 %
ERYTHROCYTE [DISTWIDTH] IN BLOOD BY AUTOMATED COUNT: 14.8 % (ref 11.5–17)
GFR SERPLBLD CREATININE-BSD FMLA CKD-EPI: >60 ML/MIN/1.73/M2
GLOBULIN SER-MCNC: 4 GM/DL (ref 2.4–3.5)
GLUCOSE SERPL-MCNC: 107 MG/DL (ref 70–110)
GLUCOSE SERPL-MCNC: 98 MG/DL (ref 74–100)
HCT VFR BLD AUTO: 32.8 % (ref 37–47)
HCT VFR BLD CALC: 35 %PCV (ref 36–54)
HGB BLD-MCNC: 10.9 G/DL (ref 12–16)
HGB BLD-MCNC: 12 G/DL
IMM GRANULOCYTES # BLD AUTO: 0.01 X10(3)/MCL (ref 0–0.04)
IMM GRANULOCYTES NFR BLD AUTO: 0.1 %
INR PPP: 1.1
LYMPHOCYTES # BLD AUTO: 2.21 X10(3)/MCL (ref 0.6–4.6)
LYMPHOCYTES NFR BLD AUTO: 28.8 %
MCH RBC QN AUTO: 28.7 PG (ref 27–31)
MCHC RBC AUTO-ENTMCNC: 33.2 G/DL (ref 33–36)
MCV RBC AUTO: 86.3 FL (ref 80–94)
MONOCYTES # BLD AUTO: 0.44 X10(3)/MCL (ref 0.1–1.3)
MONOCYTES NFR BLD AUTO: 5.7 %
NEUTROPHILS # BLD AUTO: 4.91 X10(3)/MCL (ref 2.1–9.2)
NEUTROPHILS NFR BLD AUTO: 63.9 %
NRBC BLD AUTO-RTO: 0 %
PLATELET # BLD AUTO: 587 X10(3)/MCL (ref 130–400)
PLATELETS.RETICULATED NFR BLD AUTO: 2.2 % (ref 0.9–11.2)
PMV BLD AUTO: 9.2 FL (ref 7.4–10.4)
POC IONIZED CALCIUM: 1.19 MMOL/L (ref 1.06–1.42)
POC TCO2 (MEASURED): 29 MMOL/L (ref 23–29)
POTASSIUM BLD-SCNC: 3.9 MMOL/L (ref 3.5–5.1)
POTASSIUM SERPL-SCNC: 4.1 MMOL/L (ref 3.5–5.1)
PROT SERPL-MCNC: 6.9 GM/DL (ref 6.4–8.3)
PROTHROMBIN TIME: 13.8 SECONDS (ref 12.5–14.5)
RBC # BLD AUTO: 3.8 X10(6)/MCL (ref 4.2–5.4)
SAMPLE: ABNORMAL
SODIUM BLD-SCNC: 140 MMOL/L (ref 136–145)
SODIUM SERPL-SCNC: 140 MMOL/L (ref 136–145)
TROPONIN I SERPL-MCNC: <0.01 NG/ML (ref 0–0.04)
WBC # BLD AUTO: 7.68 X10(3)/MCL (ref 4.5–11.5)

## 2024-08-12 PROCEDURE — 99284 EMERGENCY DEPT VISIT MOD MDM: CPT | Mod: FS,,, | Performed by: PSYCHIATRY & NEUROLOGY

## 2024-08-12 PROCEDURE — 99285 EMERGENCY DEPT VISIT HI MDM: CPT | Mod: 25

## 2024-08-12 PROCEDURE — 85730 THROMBOPLASTIN TIME PARTIAL: CPT | Performed by: EMERGENCY MEDICINE

## 2024-08-12 PROCEDURE — 63600175 PHARM REV CODE 636 W HCPCS: Performed by: EMERGENCY MEDICINE

## 2024-08-12 PROCEDURE — 85610 PROTHROMBIN TIME: CPT | Performed by: EMERGENCY MEDICINE

## 2024-08-12 PROCEDURE — 96374 THER/PROPH/DIAG INJ IV PUSH: CPT

## 2024-08-12 PROCEDURE — 80053 COMPREHEN METABOLIC PANEL: CPT | Performed by: EMERGENCY MEDICINE

## 2024-08-12 PROCEDURE — 84484 ASSAY OF TROPONIN QUANT: CPT | Performed by: EMERGENCY MEDICINE

## 2024-08-12 PROCEDURE — 85025 COMPLETE CBC W/AUTO DIFF WBC: CPT | Performed by: EMERGENCY MEDICINE

## 2024-08-12 RX ORDER — PREDNISONE 20 MG/1
60 TABLET ORAL DAILY
Qty: 21 TABLET | Refills: 0 | Status: SHIPPED | OUTPATIENT
Start: 2024-08-12 | End: 2024-08-19

## 2024-08-12 RX ORDER — DEXAMETHASONE SODIUM PHOSPHATE 4 MG/ML
8 INJECTION, SOLUTION INTRA-ARTICULAR; INTRALESIONAL; INTRAMUSCULAR; INTRAVENOUS; SOFT TISSUE
Status: COMPLETED | OUTPATIENT
Start: 2024-08-12 | End: 2024-08-12

## 2024-08-12 RX ORDER — VALACYCLOVIR HYDROCHLORIDE 1 G/1
1000 TABLET, FILM COATED ORAL 3 TIMES DAILY
Qty: 21 TABLET | Refills: 0 | Status: SHIPPED | OUTPATIENT
Start: 2024-08-12 | End: 2024-08-19

## 2024-08-12 RX ORDER — PREDNISONE 20 MG/1
60 TABLET ORAL DAILY
Qty: 21 TABLET | Refills: 0 | Status: SHIPPED | OUTPATIENT
Start: 2024-08-12 | End: 2024-08-12

## 2024-08-12 RX ORDER — VALACYCLOVIR HYDROCHLORIDE 1 G/1
1000 TABLET, FILM COATED ORAL 3 TIMES DAILY
Qty: 21 TABLET | Refills: 0 | Status: SHIPPED | OUTPATIENT
Start: 2024-08-12 | End: 2024-08-12

## 2024-08-12 RX ADMIN — DEXAMETHASONE SODIUM PHOSPHATE 8 MG: 4 INJECTION, SOLUTION INTRA-ARTICULAR; INTRALESIONAL; INTRAMUSCULAR; INTRAVENOUS; SOFT TISSUE at 03:08

## 2024-08-12 NOTE — CONSULTS
[facility] - Emergency Dept  Neurology  Consult Note      Brenden Mccauley is a 54 y.o. female who presented to [facility] on 8/12/2024 with reports of  left facial droop and inability to close left eye . Onset of symptoms was  acute, not related to anything specific . Stroke risk factors include diabetes mellitus, hypercoagulable state, hyperlipidemia, and hypertension. Prior stroke history: none.     Patient reported that she was driving to a follow up appoint with Dr. David when she suddenly felt her left face was numb, she looked in the rear view mirror and noticed a left facial droop. She was recently diagnosed with breast cancer and is status post breast flap on 7/23/2024.     Upon assessment by ER physician felt that she appeared to have a Sleepy Eye Palsy like symptoms verus acute stroke. Stroke code activated. CT head negative. I discussed case with ER MD and Vascular Neurologist, we agreed that stat limited MRI would be beneficial to rule out stroke as patient was well within the window for thrombolytic and high suspicion for stroke mimic. Discussed Tenecteplase while awaiting imaging and she felt like her deficits did not outweigh the risk of thrombolytic therapy. MRI negative for acute ischemic infarct but did reveal a suspected 1.5 cm left frontal parafalcine meningioma. Neurosurgery consulted for meningioma and patient will be treated for Sleepy Eye Palsy.       Past Medical History:   Diagnosis Date    Abnormal uterine bleeding 2006    Anemia     Anxiety     Cancer     right breast    Depression 1992    Diabetes mellitus     Endometriosis of uterus     Fibroid     Heart attack 2010    Hyperlipidemia     Hypertension 10/04/2022    Menopause 12/2017    Obesity, unspecified     BMI 32.10    Pregnancy 1987    Miscarriage    Sleep apnea     no cpap     Past Surgical History:   Procedure Laterality Date    ABDOMINAL SURGERY  2020    APPENDECTOMY      BREAST SURGERY Bilateral     breast  reduction    CERVICAL BIOPSY  W/ LOOP ELECTRODE EXCISION      CERVIX LESION DESTRUCTION       SECTION      COSMETIC SURGERY      Breast reduction    DILATION AND CURETTAGE OF UTERUS      ENDOMETRIAL ABLATION      gastric sleeve      HYSTERECTOMY      HYSTEROSCOPY      INSERTION OF BREAST TISSUE EXPANDER Bilateral 3/15/2024    Procedure: INSERTION, TISSUE EXPANDER, BREAST (bilateral breast recon with placement of tissue expanders and acellular dermal matrix, use of intraoperative fluoroscein angiography);  Surgeon: Ubaldo David MD;  Location: [facility];  Service: Plastics;  Laterality: Bilateral;  300mL filled for each tissue expander    JOINT REPLACEMENT      bilateral knees    LUMBAR LAMINECTOMY N/A 2023    Procedure: LAMINECTOMY, SPINE, LUMBAR L4 SNS:SSEP/EMG;  Surgeon: Kamlesh Gomez MD;  Location: [facility] OR;  Service: Orthopedics;  Laterality: N/A;    LUMBAR LAMINECTOMY N/A 2023    Procedure: LAMINECTOMY, SPINE, LUMBAR L4;  Surgeon: Kamlesh Gomez MD;  Location: [address];  Service: Neurosurgery;  Laterality: N/A;    RECONSTRUCTION OF BREAST WITH DEEP INFERIOR EPIGASTRIC ARTERY  (KENNETH) FLAP Bilateral 2024    Procedure: RECONSTRUCTION, BREAST, USING KENNETH SKIN FLAP (Bilateral breast recon with KENNETH flaps, possible TRAM flaps, possible ventral hernia with mesh, possible rectus muscle plication, use of intraoperative fluoroscein angiography);  Surgeon: Ubaldo David MD;  Location: [facility];  Service: Plastics;  Laterality: Bilateral;    SENTINEL LYMPH NODE BIOPSY Right 3/15/2024    Procedure: BIOPSY, LYMPH NODE, SENTINEL - RIGHT MagTrace Injected in office SentiMag needed No NUC MED;  Surgeon: Christina Romero MD;  Location: [facility];  Service: General;  Laterality: Right;  MagTrace Injected in office  SentiMag needed  No NUC MED    SIMPLE MASTECTOMY Bilateral 3/15/2024    Procedure: MASTECTOMY, SIMPLE - BILATERAL;  Surgeon: Christina Romero MD;   Location: [facility] OR;  Service: General;  Laterality: Bilateral;    SPINE SURGERY      TISSUE EXPANDER REMOVAL Bilateral 2024    Procedure: REMOVAL, TISSUE EXPANDER;  Surgeon: Ubaldo David MD;  Location: [facility] OR;  Service: Plastics;  Laterality: Bilateral;    TONSILLECTOMY      TOTAL KNEE ARTHROPLASTY Bilateral     TUBAL LIGATION      WISDOM TOOTH EXTRACTION       Family History   Problem Relation Name Age of Onset    Diabetes type I Mother Paul Hamm     Multiple myeloma Mother Paul Hamm 55    Diabetes Mother aPul Hamm     Hypertension Mother Paul Hamm     Kidney disease Mother Paul Hamm     Alzheimer's disease Father              No Known Problems Sister      No Known Problems Brother      Pancreatic cancer Maternal Grandmother  70    Breast cancer Paternal Grandmother Claudette Hamm 55    Liver cancer Paternal Grandmother Claudette Hamm 55    Breast cancer Paternal Aunt Lesley Fulton 62     Social History     Tobacco Use    Smoking status: Former     Average packs/day: 0.5 packs/day for 5.0 years (2.5 ttl pk-yrs)     Types: Cigarettes     Start date: 2018     Quit date: 2018     Years since quittin.6    Smokeless tobacco: Never    Tobacco comments:     1 pk every 3 days    Substance Use Topics    Alcohol use: Not Currently     Alcohol/week: 1.0 standard drink of alcohol     Types: 1 Glasses of wine per week    Drug use: Never      Review of patient's allergies indicates:   Allergen Reactions    Latex, natural rubber Hives, Anxiety, Itching, Palpitations and Swelling     Other Reaction(s): Unknown    Tomato (solanum lycopersicum) Hives and Swelling         STROKE DOCUMENTATION   Acute Stroke Times   Last Known Normal Date: 24  Last Known Normal Time: 1300  Symptom Onset Date: 24  Stroke Team Called Date: 24  Stroke Team Called Time: 1355  Stroke Team Arrival Date: 24  Stroke Team Arrival Time: 1356  Thrombolytic Therapy  Recommended: Yes (concern for stroke mimic with recent surgical procdure, recomend stat limited MRI)    NIH Scale:  Interval: baseline  1a. Level of Consciousness: 0-->Alert, keenly responsive  1b. LOC Questions: 0-->Answers both questions correctly  1c. LOC Commands: 0-->Performs both tasks correctly  2. Best Gaze: 0-->Normal  3. Visual: 0-->No visual loss  4. Facial Palsy: 1-->Minor paralysis (flattened nasolabial fold, asymmetry on smiling)  5a. Motor Arm, Left: 0-->No drift, limb holds 90 (or 45) degrees for full 10 secs  5b. Motor Arm, Right: 0-->No drift, limb holds 90 (or 45) degrees for full 10 secs  6a. Motor Leg, Left: 0-->No drift, leg holds 30 degree position for full 5 secs  6b. Motor Leg, Right: 0-->No drift, leg holds 30 degree position for full 5 secs  7. Limb Ataxia: 0-->Absent  8. Sensory: 1-->Mild-to-moderate sensory loss, patient feels pinprick is less sharp or is dull on the affected side, or there is a loss of superficial pain with pinprick, but patient is aware of being touched  9. Best Language: 0-->No aphasia, normal  10. Dysarthria: 0-->Normal  11. Extinction and Inattention (formerly Neglect): 0-->No abnormality  Total (NIH Stroke Scale): 2     Modified Ocean    Keith Coma Scale:    ABCD2 Score:    [id number] Score:   HAS -BLED Score:   ICH Score:   Hunt & Phan Classification:         Neurological Exam:   Speech: No dysarthria  Orientation: Person, Place, Time  Facial Sensation (CN V): Facial sensory loss left upper and left lower, Corneal reflex intact  Facial Movement (CN VII): lower weakness left lower  Motor*: Arm Left:  Normal (5/5), Leg Left:   Normal (5/5), Arm Right:   Normal (5/5), Leg Right:   Normal (5/5)        Laboratory:  BMP:   Lab Results   Component Value Date    GLUCOSE 82 06/24/2024     06/24/2024    K 4.5 06/24/2024     06/24/2024    CO2 27 06/24/2024    BUN 9.7 (L) 06/24/2024    CREATININE 0.74 06/24/2024    CALCIUM 9.8 06/24/2024     CBC:   Lab  Results   Component Value Date    WBC 7.68 08/12/2024    WBC 7.1 12/11/2023    RBC 3.80 (L) 08/12/2024    HGB 10.9 (L) 08/12/2024    HGB 10.2 (L) 12/11/2023    HCT 32.8 (L) 08/12/2024    HCT 35 (L) 08/12/2024     (H) 08/12/2024     12/11/2023    MCV 86.3 08/12/2024    MCH 28.7 08/12/2024    MCH 26.1 (L) 12/11/2023    MCHC 33.2 08/12/2024     Lipid Panel:   Lab Results   Component Value Date    CHOL 211 (H) 12/11/2023    LDLCALC 131 (H) 12/11/2023    HDL 58 12/11/2023    TRIG 123 12/11/2023     Coagulation:   Lab Results   Component Value Date    INR 1.0 07/03/2024    APTT 32.3 08/15/2023     Hgb A1C:   Lab Results   Component Value Date    HGBA1C 5.5 07/03/2024     TSH:   Lab Results   Component Value Date    TSH 0.883 12/11/2023       Diagnostic Results  Brain Imaging:   -CTh: No acute abnormality. 14:17  Cerebrovascular Imaging:   -CTA h/n: 14:24  Cardiac Evaluation:   -EKG/Telemetry:         Discussed with neurologist on call: Dr. Amato @ 12: 06  Thrombolysis Candidate? Yes. The risks and benefits of Tenecteplase were discussed with patient/family. Also discussed that medication is off-FDA label but that it is within the standard of care and appropriate for their treatment. The patient and/or family verbalized understanding of risks, benefits, off-label nature, and has given verbal consent for Tenecteplase. If patient was not competent, or no family was available, treatment will be administered as an emergency procedure and in what we believe to be the patients best interest.  -Delays to Thrombolysis?  Yes, Care-team unable to determine eligibility (Timeline of stroke onset evolved- wakeup stroke requiring MRI, etc)    Interventional Revascularization Candidate? No; no significant neurologic deficit (NIHSS <6)   -Delays to Thrombectomy? Not Applicable  Discussed with Interventional Neurology at 12:16 , recommends treat for suspected [place] Palsy            PLAN:    Low suspicion for AIS. Patient  appears to have possible [place] Palsy. Recent fever reported. Treat for [place] Palsy.  No further stroke workup recommended. Will sign off.       Thank you for your consult.  I will sign off. Please contact us if you have any additional questions.      Mona Curran NP  Neurology  [facility] - Emergency Dept

## 2024-08-12 NOTE — ED PROVIDER NOTES
Encounter Date: 2024       History     Chief Complaint   Patient presents with    Facial Droop     Pt states 1 hour pt she began having left sided facial droop and decreased sensation to left side of face and left arm. When pt closes left eye, eyelid does not fully close.     64-year-old female with a history of breast cancer status post mastectomy and subsequent breast reconstruction, most recent surgery 2024, presents to the emergency department from her reconstructive surgeons office for concern of acute onset facial droop within the last 1 hour.  Reports waking and feeling fine this morning; however, approximately 1 hour ago started to feel some tingling, numbness in the side of her face and then noted her face was not moving symmetrically when laughing on the phone with her sister.  She denies any vision changes, denies any extremity numbness, tingling, or weakness.    The history is provided by the patient, medical records and the EMS personnel.     Review of patient's allergies indicates:   Allergen Reactions    Latex, natural rubber Hives, Anxiety, Itching, Palpitations and Swelling     Other Reaction(s): Unknown    Tomato (solanum lycopersicum) Hives and Swelling     Past Medical History:   Diagnosis Date    Abnormal uterine bleeding 2006    Anemia     Anxiety     Cancer     right breast    Depression 1992    Diabetes mellitus     Endometriosis of uterus     Fibroid     Heart attack     Hyperlipidemia     Hypertension 10/04/2022    Menopause 2017    Obesity, unspecified     BMI 32.10    Pregnancy 1987    Miscarriage    Sleep apnea     no cpap     Past Surgical History:   Procedure Laterality Date    ABDOMINAL SURGERY      APPENDECTOMY      BREAST SURGERY Bilateral     breast reduction    CERVICAL BIOPSY  W/ LOOP ELECTRODE EXCISION      CERVIX LESION DESTRUCTION       SECTION      COSMETIC SURGERY      Breast reduction    DILATION AND CURETTAGE OF UTERUS  1987    ENDOMETRIAL  ABLATION      gastric sleeve      HYSTERECTOMY      HYSTEROSCOPY      INSERTION OF BREAST TISSUE EXPANDER Bilateral 3/15/2024    Procedure: INSERTION, TISSUE EXPANDER, BREAST (bilateral breast recon with placement of tissue expanders and acellular dermal matrix, use of intraoperative fluoroscein angiography);  Surgeon: Ubaldo David MD;  Location: [facility] OR;  Service: Plastics;  Laterality: Bilateral;  300mL filled for each tissue expander    JOINT REPLACEMENT  2020/2022    bilateral knees    LUMBAR LAMINECTOMY N/A 05/29/2023    Procedure: LAMINECTOMY, SPINE, LUMBAR L4 SNS:SSEP/EMG;  Surgeon: Kamlesh Gomez MD;  Location: [facility] OR;  Service: Orthopedics;  Laterality: N/A;    LUMBAR LAMINECTOMY N/A 08/22/2023    Procedure: LAMINECTOMY, SPINE, LUMBAR L4;  Surgeon: Kamlesh Gomez MD;  Location: [address];  Service: Neurosurgery;  Laterality: N/A;    RECONSTRUCTION OF BREAST WITH DEEP INFERIOR EPIGASTRIC ARTERY  (KENNETH) FLAP Bilateral 7/23/2024    Procedure: RECONSTRUCTION, BREAST, USING KENNETH SKIN FLAP (Bilateral breast recon with KENNETH flaps, possible TRAM flaps, possible ventral hernia with mesh, possible rectus muscle plication, use of intraoperative fluoroscein angiography);  Surgeon: Ubaldo David MD;  Location: [facility] OR;  Service: Plastics;  Laterality: Bilateral;    SENTINEL LYMPH NODE BIOPSY Right 3/15/2024    Procedure: BIOPSY, LYMPH NODE, SENTINEL - RIGHT MagTrace Injected in office SentiMag needed No NUC MED;  Surgeon: Christina Romero MD;  Location: [facility] OR;  Service: General;  Laterality: Right;  MagTrace Injected in office  SentiMag needed  No NUC MED    SIMPLE MASTECTOMY Bilateral 3/15/2024    Procedure: MASTECTOMY, SIMPLE - BILATERAL;  Surgeon: Christina Romero MD;  Location: [facility] OR;  Service: General;  Laterality: Bilateral;    SPINE SURGERY  2023    TISSUE EXPANDER REMOVAL Bilateral 7/23/2024    Procedure: REMOVAL, TISSUE EXPANDER;  Surgeon: Ubaldo David MD;  Location: [facility]  OR;  Service: Plastics;  Laterality: Bilateral;    TONSILLECTOMY      TOTAL KNEE ARTHROPLASTY Bilateral     TUBAL LIGATION      WISDOM TOOTH EXTRACTION       Family History   Problem Relation Name Age of Onset    Diabetes type I Mother Paul Hamm     Multiple myeloma Mother Paul Hamm 55    Diabetes Mother Paul Hamm     Hypertension Mother Paul Hamm     Kidney disease Mother Paul Hamm     Alzheimer's disease Father              No Known Problems Sister      No Known Problems Brother      Pancreatic cancer Maternal Grandmother  70    Breast cancer Paternal Grandmother Claudette Hamm 55    Liver cancer Paternal Grandmother Claudette Hamm 55    Breast cancer Paternal Aunt Lesley Fulton 62     Social History     Tobacco Use    Smoking status: Former     Average packs/day: 0.5 packs/day for 5.0 years (2.5 ttl pk-yrs)     Types: Cigarettes     Start date: 2018     Quit date: 2018     Years since quittin.6    Smokeless tobacco: Never    Tobacco comments:     1 pk every 3 days    Substance Use Topics    Alcohol use: Not Currently     Alcohol/week: 1.0 standard drink of alcohol     Types: 1 Glasses of wine per week    Drug use: Never     Review of Systems   Constitutional:  Negative for fever.   Eyes:  Negative for visual disturbance.   Neurological:  Positive for facial asymmetry. Negative for weakness, numbness and headaches.       Physical Exam     Initial Vitals [24 1355]   BP Pulse Resp Temp SpO2   (!) 148/88 (!) 56 20 98.1 °F (36.7 °C) 98 %      MAP       --         Physical Exam    Nursing note and vitals reviewed.  Constitutional: She appears well-developed and well-nourished. No distress.   HENT:   Head: Normocephalic and atraumatic.   Mouth/Throat: Oropharynx is clear and moist.   No TM erythema, no facial or auditory canal vesicles noted   Eyes: Conjunctivae are normal. Pupils are equal, round, and reactive to light.   Neck: Neck supple.   Normal range of  motion.  Cardiovascular:  Normal rate, regular rhythm and normal heart sounds.           No murmur heard.  Pulmonary/Chest: Breath sounds normal. No respiratory distress.   Abdominal: Abdomen is soft. She exhibits no distension. There is no abdominal tenderness.   Musculoskeletal:         General: Normal range of motion.      Cervical back: Normal range of motion and neck supple.     Neurological: She is alert and oriented to person, place, and time. GCS score is 15. GCS eye subscore is 4. GCS verbal subscore is 5. GCS motor subscore is 6.   5/5 strength bilateral upper and lower extremities.  Sensation intact to touch over bilateral upper and lower extremities.  Left facial palsy involving the forehead with impaired closing of the left eyelid.   Skin: Skin is warm and dry. No rash noted.   Psychiatric: She has a normal mood and affect.         ED Course   Procedures  Labs Reviewed   COMPREHENSIVE METABOLIC PANEL - Abnormal       Result Value    Sodium 140      Potassium 4.1      Chloride 104      CO2 25      Glucose 98      Blood Urea Nitrogen 7.6 (*)     Creatinine 0.71      Calcium 9.3      Protein Total 6.9      Albumin 2.9 (*)     Globulin 4.0 (*)     Albumin/Globulin Ratio 0.7 (*)     Bilirubin Total 0.2            ALT 13      AST 18      eGFR >60      Anion Gap 11.0      BUN/Creatinine Ratio 11     APTT - Abnormal    PTT 34.4 (*)    CBC WITH DIFFERENTIAL - Abnormal    WBC 7.68      RBC 3.80 (*)     Hgb 10.9 (*)     Hct 32.8 (*)     MCV 86.3      MCH 28.7      MCHC 33.2      RDW 14.8      Platelet 587 (*)     MPV 9.2      Neut % 63.9      Lymph % 28.8      Mono % 5.7      Eos % 1.2      Basophil % 0.3      Lymph # 2.21      Neut # 4.91      Mono # 0.44      Eos # 0.09      Baso # 0.02      IG# 0.01      IG% 0.1      NRBC% 0.0      IPF 2.2     ISTAT CHEM8 - Abnormal    POC Glucose 107      POC BUN 7      POC Creatinine 0.7      POC Sodium 140      POC Potassium 3.9      POC Chloride 102      POC TCO2  (MEASURED) 29      POC Anion Gap 14      POC Ionized Calcium 1.19      POC Hematocrit 35 (*)     POC HEMOGLOBIN 12      Sample VENOUS     TROPONIN I - Normal    Troponin-I <0.010     PROTIME-INR - Normal    PT 13.8      INR 1.1     CBC W/ AUTO DIFFERENTIAL    Narrative:     The following orders were created for panel order CBC auto differential.  Procedure                               Abnormality         Status                     ---------                               -----------         ------                     CBC with Differential[[id number]]       Abnormal            Final result                 Please view results for these tests on the individual orders.     EKG Readings: (Independently Interpreted)   Initial Reading: No STEMI. Rhythm: Normal Sinus Rhythm. Heart Rate: 67. Ectopy: No Ectopy. ST Segments: Normal ST Segments. T Waves: Normal. Axis: Normal. Clinical Impression: Normal Sinus Rhythm   08/12/2024 @ 1456       Imaging Results              MRI Brain Without Contrast (Final result)  Result time 08/12/24 15:01:50   Procedure changed from MRI Brain Limited Without Contrast     Final result by Adriana Grubbs MD (08/12/24 15:01:50)                   Impression:      1. No acute intracranial abnormality.  2. Suspected 1.5 cm left frontal parafalcine meningioma.      Electronically signed by: Adriana Grubbs  Date:    08/12/2024  Time:    15:01               Narrative:    EXAMINATION:  MRI BRAIN WITHOUT CONTRAST    CLINICAL HISTORY:  concern for bells palsy;    TECHNIQUE:  Multiplanar, multisequence MR images of the brain were obtained without the administration of intravenous contrast.    COMPARISON:  CT head dated 08/12/2024    FINDINGS:  There is no restricted diffusion, hemorrhage or edema.  Minimal scattered T2/FLAIR hyperintensities in the subcortical and periventricular white matter likely represent chronic microvascular ischemic changes.  There is a 1.5 cm dural based mass along the left  frontal falx measuring 1.5 cm in size (series 8, image 11).    There is no mass effect or midline shift.  The basal cisterns are patent.  The ventricles are normal in size.  The major intracranial flow voids are patent.  The paranasal sinuses are clear.                                       CTA Head and Neck (xpd) (Final result)  Result time 08/12/24 14:24:36      Final result by Adriana Grubbs MD (08/12/24 14:24:36)                   Impression:      No large vessel occlusion or flow-limiting stenosis.      Electronically signed by: Adriana Grubbs  Date:    08/12/2024  Time:    14:24               Narrative:    EXAMINATION:  CTA HEAD AND NECK (XPD)    CLINICAL HISTORY:  Stroke/TIA, determine embolic source;    TECHNIQUE:  Axial images obtained through the cervical region and [place] before and after the administration of intravenous contrast.    Coronal, sagittal, MIP and 3D reconstructions were obtained from the axial data.    Automatic exposure control was utilized to limit radiation dose.    Radiation Dose:    Total DLP: 14 90 mGy*cm    COMPARISON:  CT head from the same day    FINDINGS:  Head CT with contrast:    No interval changes when compared to the previous CT.    No enhancing abnormalities.    If present, stenosis of the carotid bulbs is measured based on NASCET criteria,    i.e. area of maximal stenosis compared to the cervical ICA distal to the bulb.    Cervical CTA:    The origins of the great vessels are patent.    Evaluation is limited by motion artifact.  The common carotid arteries, carotid bulbs and internal carotid arteries appear patent.    The vertebral arteries are patent.    Intracranial CTA:    The internal carotid arteries, middle cerebral arteries and anterior cerebral arteries are patent.    The vertebral arteries, basilar artery and posterior cerebral arteries are patent.    The dural venous sinuses are patent.    Additional findings:    Multinodular thyroid gland.                                        CT HEAD FOR STROKE (Final result)  Result time 08/12/24 14:17:49      Final result by Adriana Grubbs MD (08/12/24 14:17:49)                   Impression:      No acute intracranial abnormality.    Code fast findings given to Dr. Ochoa at the time of dictation.      Electronically signed by: Adriana Grubbs  Date:    08/12/2024  Time:    14:17               Narrative:    EXAMINATION:  CT HEAD FOR STROKE    CLINICAL HISTORY:  Neuro deficit, acute, stroke suspected;stroke alert;;    TECHNIQUE:  Axial scans were obtained from skull base to the vertex.    Coronal and sagittal reconstructions obtained from the axial data.    Automatic exposure control was utilized to limit radiation dose.    Contrast: None    Radiation Dose:    Total DLP: 956 mGy*cm    COMPARISON:  None    FINDINGS:  There is no acute intracranial hemorrhage or edema. The gray-white matter differentiation is preserved.    There is no mass effect or midline shift. The ventricles and sulci are normal in size. The basal cisterns are patent. There is no abnormal extra-axial fluid collection.    The calvarium and skull base are intact. The visualized paranasal sinuses and the mastoid air cells are clear.                                       Medications   dexAMETHasone injection 8 mg (8 mg Intravenous Given 8/12/24 1542)     Medical Decision Making  Problems Addressed:  Bell's palsy: acute illness or injury    Amount and/or Complexity of Data Reviewed  Labs: ordered.    Risk  Prescription drug management.      ED assessment:    Ms. Berry presented for evaluation of acute onset left facial droop approximately 1 hour prior to arrival.  Activated as a stroke code.    Differential diagnosis (including but not limited to):   Cranial nerve 7 peripheral palsy/Bell's palsy, CVA, intracranial mass, malignancy    ED management:   Noncontrast CT with no acute findings.  Neurology evaluated the patient as part of the stroke  activation, recommended rapid MRI to evaluate for any evidence of ischemia.  No ischemia noted on MRI; however, incidentally noted 1.5 cm bowel seen growth, likely meningioma.  Neuro okay with discharge home.  Will place on steroids, bowel acyclovir for Bell's palsy.  Lacri-Lube ointment provided as well and counseled to use rewetting drops to prevent eye from drying out, may consider taping I closed when sleeping.  With regards to the suspected meningioma on MRI.  Unlikely contributing to the patient's acute symptoms; however, I did inform the patient of this and phone call following her ED visit and submitted a referral to Neurosurgery to help her establish follow up care.  ED return precautions reviewed at the bedside and provided in the written discharge instructions. All questions answered to the best of my ability.       My independent radiology interpretation:   CT head without IV contrast: No acute intracranial hemorrhage or mass lesion    Amount and/or Complexity of Data Reviewed  Independent historian: EMS   Summary of history:  Patient reported acute onset left facial droop proximally 1 hour prior to arrival.  No anticoagulant use.  No history of previous CVA  External data reviewed: notes from previous admissions  Summary of data reviewed:  Underwent breast reconstruction with Dr. David 07/23/2024.  Risk and benefits of testing: discussed   Labs: ordered and reviewed  Radiology: ordered and independent interpretation performed (see above or ED course)  ECG/medicine tests: ordered and independent interpretation performed (see above or ED course)  Discussion of management or test interpretation with external provider(s): discussed with Neurology consultant   Summary of discussion:  As below    Risk  Prescription drug management   Shared decision making     Critical Care  none    I, Emily Ochoa MD personally performed the history, PE, MDM, and procedures as documented above and agree with the scribe's  documentation.              ED Course as of 08/12/24 1638   Mon Aug 12, 2024   1417 No acute findings on noncontrast CT per radiologist. [KS]   1436 Discussed with stroke team, they recommend limited brain MRI to evaluate for CVA, symptoms more consistent with Bell's palsy.  Hold on thrombolytics pending MRI result. [KS]   1521 No stroke or evidence of ischemia on MRI brain.  Presentation consistent with Bell's palsy.  Awaiting laboratory studies as previously collected, will plan to treat with steroids, [KS]      ED Course User Index  [KS] Emily Ochoa MD                           Clinical Impression:  Final diagnoses:  [G51.0] Bell's palsy (Primary)          ED Disposition Condition    Discharge Stable          ED Prescriptions       Medication Sig Dispense Start Date End Date Auth. Provider                      predniSONE (DELTASONE) 20 MG tablet Take 3 tablets (60 mg total) by mouth once daily. for 7 days 21 tablet 8/12/2024 8/19/2024 Emily Ochoa MD    valACYclovir (VALTREX) 1000 MG tablet Take 1 tablet (1,000 mg total) by mouth 3 (three) times daily. for 7 days 21 tablet 8/12/2024 8/19/2024 Emily Ochoa MD          Follow-up Information       Follow up With Specialties Details Why Contact Info    Jani Rutherford, DO Family Medicine Schedule an appointment as soon as possible for a visit   [address] [facility] [place] [id number]  [phone]      [facility] - Emergency Dept Emergency Medicine  As needed, If symptoms worsen [address] [facility] [id number] [phone]             Emily Ochoa MD  08/15/24 1212

## 2024-08-13 ENCOUNTER — OFFICE VISIT (OUTPATIENT)
Dept: HEMATOLOGY/ONCOLOGY | Facility: CLINIC | Age: 54
End: 2024-08-13
Payer: OTHER GOVERNMENT

## 2024-08-13 DIAGNOSIS — R45.89 ANXIETY ABOUT HEALTH: Primary | ICD-10-CM

## 2024-08-13 LAB
POC PTINR: 1.2 (ref 0.9–1.2)
POC PTWBT: 14.5 SEC (ref 9.7–14.3)
SAMPLE: ABNORMAL

## 2024-08-13 PROCEDURE — 90837 PSYTX W PT 60 MINUTES: CPT | Mod: 95,,, | Performed by: SOCIAL WORKER

## 2024-08-13 NOTE — PROGRESS NOTES
"  Individual Psychotherapy (LCSW/PhD)  Brenden Mccauley,  8/13/2024    Site: [facility]    Therapeutic Intervention: Met with patient for individual psychotherapy.    Chief complaint/reason for encounter: behavior, somatic, and interpersonal     Interval history and content of current session: I met with Noemi for her scheduled appointment for approximately 60 minutes. We met virtually since she is still not released to drive. She experienced [place] Palsy yesterday and spent some time in the ED. She shared with me her not so positive experience in the ED. She is being treated and already feels her symptoms subsiding. She is also doing exercises as prescribed. In regards to her mental health, she began journaling. Her daughter sent her two different journals. One to use now and one to use later on. She is practicing some of the things we discussed in our last session. She also stated she is trying to "remove herself from her thoughts". She is becoming the observer of her thoughts. I introduced deep breathing exercises, point of contact exercise/body scan and a 5 senses exercise.    Treatment plan:  Target symptoms: adjustment, grief  Why chosen therapy is appropriate versus another modality: patient responds to this modality, evidence based practice  Outcome monitoring methods: self-report, observation  Therapeutic intervention type: insight oriented psychotherapy, behavior modifying psychotherapy, supportive psychotherapy, interactive psychotherapy    Risk parameters:  Patient reports no suicidal ideation  Patient reports no homicidal ideation  Patient reports no self-injurious behavior  Patient reports no violent behavior    Verbal deficits: None    Patient's response to intervention:  The patient's response to intervention is accepting.    Progress toward goals and other mental status changes:  The patient's progress toward goals is excellent.    Diagnosis:   No diagnosis found.    Plan: Pt " plans to continue individual psychotherapy    Return to clinic: 1 week, as scheduled    Length of Service (minutes): 60

## 2024-08-14 DIAGNOSIS — D32.9 MENINGIOMA: Primary | ICD-10-CM

## 2024-08-20 ENCOUNTER — OFFICE VISIT (OUTPATIENT)
Dept: HEMATOLOGY/ONCOLOGY | Facility: CLINIC | Age: 54
End: 2024-08-20
Payer: OTHER GOVERNMENT

## 2024-08-20 DIAGNOSIS — R45.89 ANXIETY ABOUT HEALTH: Primary | ICD-10-CM

## 2024-08-20 PROCEDURE — 99211 OFF/OP EST MAY X REQ PHY/QHP: CPT | Mod: PBBFAC | Performed by: SOCIAL WORKER

## 2024-08-20 PROCEDURE — 99999 PR PBB SHADOW E&M-EST. PATIENT-LVL I: CPT | Mod: PBBFAC,,, | Performed by: SOCIAL WORKER

## 2024-08-20 PROCEDURE — 90837 PSYTX W PT 60 MINUTES: CPT | Mod: ,,, | Performed by: SOCIAL WORKER

## 2024-08-20 NOTE — PROGRESS NOTES
Individual Psychotherapy (LCSW/PhD)  Brenden Mccauley,  8/20/2024    Site: [facility]     Therapeutic Intervention: Met with patient for individual psychotherapy.    Chief complaint/reason for encounter: interpersonal     Interval history and content of current session: I met with Noemi in person today. She is continuing to recover from her surgery as well as [place] Palsy. We met for approximately 55 minutes. She is still feeling sore and moving cautiously. She is feeling anxious about the phase of the plan of care. She is at the beginning of phase 2/reconstruction. She has an upcoming appointment with the surgeon on the 26th. We discussed the last week and any difficult situations she found herself in or having to use the coping mechanisms that have been introduced. We discussed her inner child and journaling. She will utilize the suggestions for this next week.    Treatment plan:  Target symptoms: distractability, adjustment, grief  Why chosen therapy is appropriate versus another modality: relevant to diagnosis, patient responds to this modality, evidence based practice  Outcome monitoring methods: self-report, observation  Therapeutic intervention type: insight oriented psychotherapy, behavior modifying psychotherapy, supportive psychotherapy, interactive psychotherapy    Risk parameters:  Patient reports no suicidal ideation  Patient reports no homicidal ideation  Patient reports no self-injurious behavior  Patient reports no violent behavior    Verbal deficits: None    Patient's response to intervention:  The patient's response to intervention is accepting.    Progress toward goals and other mental status changes:  The patient's progress toward goals is excellent.    Diagnosis:     ICD-10-CM ICD-9-CM   1. Anxiety about health  R45.89 799.29       Plan: Pt plans to continue individual psychotherapy    Return to clinic: 1 week, as scheduled    Length of Service (minutes): 60

## 2024-08-22 ENCOUNTER — EXTERNAL HOME HEALTH (OUTPATIENT)
Dept: HOME HEALTH SERVICES | Facility: HOSPITAL | Age: 54
End: 2024-08-22
Payer: OTHER GOVERNMENT

## 2024-09-09 ENCOUNTER — OFFICE VISIT (OUTPATIENT)
Dept: HEMATOLOGY/ONCOLOGY | Facility: CLINIC | Age: 54
End: 2024-09-09
Payer: OTHER GOVERNMENT

## 2024-09-09 DIAGNOSIS — R45.89 ANXIETY ABOUT HEALTH: Primary | ICD-10-CM

## 2024-09-09 PROCEDURE — 99211 OFF/OP EST MAY X REQ PHY/QHP: CPT | Mod: PBBFAC | Performed by: SOCIAL WORKER

## 2024-09-09 PROCEDURE — 99999 PR PBB SHADOW E&M-EST. PATIENT-LVL I: CPT | Mod: PBBFAC,,, | Performed by: SOCIAL WORKER

## 2024-09-09 PROCEDURE — 90837 PSYTX W PT 60 MINUTES: CPT | Mod: ,,, | Performed by: SOCIAL WORKER

## 2024-09-09 NOTE — PROGRESS NOTES
Individual Psychotherapy (LCSW/PhD)  Brenden Mccauley,  9/9/2024    Site: [facility]     Therapeutic Intervention: Met with patient for individual psychotherapy.    Chief complaint/reason for encounter: depression, somatic, and interpersonal     Interval history and content of current session: I met with Noemi for 55 minutes in person. She explained why she had to cancel her last appointment. She was having stomach issues. She is doing ok. No real major emotional issues. She got a puppy gifted to her from friends. She now has two dogs. It is filling a void and giving her something to do. Her daughter has suggested that she take up crocheting or knitting. We discussed resources in the community for groups. [facility] discussed the [facility] and getting involved there for exercise and connecting with others. She is still journaling and dealing with her emotions. She has the support of her children and siblings.     Treatment plan:  Target symptoms: adjustment, grief  Why chosen therapy is appropriate versus another modality: relevant to diagnosis, patient responds to this modality, evidence based practice  Outcome monitoring methods: self-report, observation  Therapeutic intervention type: insight oriented psychotherapy, behavior modifying psychotherapy, supportive psychotherapy, interactive psychotherapy    Risk parameters:  Patient reports no suicidal ideation  Patient reports no homicidal ideation  Patient reports no self-injurious behavior  Patient reports no violent behavior    Verbal deficits: None    Patient's response to intervention:  The patient's response to intervention is accepting.    Progress toward goals and other mental status changes:  The patient's progress toward goals is good.    Diagnosis:     ICD-10-CM ICD-9-CM   1. Anxiety about health  R45.89 799.29       Plan: Pt plans to continue individual psychotherapy    Return to clinic: 1 week    Length of Service (minutes): 60

## 2024-09-16 ENCOUNTER — OFFICE VISIT (OUTPATIENT)
Dept: HEMATOLOGY/ONCOLOGY | Facility: CLINIC | Age: 54
End: 2024-09-16
Payer: OTHER GOVERNMENT

## 2024-09-16 DIAGNOSIS — R45.89 ANXIETY ABOUT HEALTH: Primary | ICD-10-CM

## 2024-09-16 PROCEDURE — 99211 OFF/OP EST MAY X REQ PHY/QHP: CPT | Mod: PBBFAC | Performed by: SOCIAL WORKER

## 2024-09-16 PROCEDURE — 99999 PR PBB SHADOW E&M-EST. PATIENT-LVL I: CPT | Mod: PBBFAC,,, | Performed by: SOCIAL WORKER

## 2024-09-16 PROCEDURE — 90837 PSYTX W PT 60 MINUTES: CPT | Mod: ,,, | Performed by: SOCIAL WORKER
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
## 2024-10-01 ENCOUNTER — LAB VISIT (OUTPATIENT)
Dept: LAB | Facility: HOSPITAL | Age: 54
End: 2024-10-01
Attending: INTERNAL MEDICINE
Payer: OTHER GOVERNMENT

## 2024-10-01 DIAGNOSIS — Z17.0 MALIGNANT NEOPLASM OF UPPER-OUTER QUADRANT OF RIGHT BREAST IN FEMALE, ESTROGEN RECEPTOR POSITIVE: ICD-10-CM

## 2024-10-01 DIAGNOSIS — C50.411 MALIGNANT NEOPLASM OF UPPER-OUTER QUADRANT OF RIGHT BREAST IN FEMALE, ESTROGEN RECEPTOR POSITIVE: ICD-10-CM

## 2024-10-01 DIAGNOSIS — D50.8 IRON DEFICIENCY ANEMIA SECONDARY TO INADEQUATE DIETARY IRON INTAKE: ICD-10-CM

## 2024-10-01 LAB
ALBUMIN SERPL-MCNC: 3.8 G/DL (ref 3.5–5)
ALBUMIN/GLOB SERPL: 1.1 RATIO (ref 1.1–2)
ALP SERPL-CCNC: 111 UNIT/L (ref 40–150)
ALT SERPL-CCNC: 13 UNIT/L (ref 0–55)
ANION GAP SERPL CALC-SCNC: 7 MEQ/L
AST SERPL-CCNC: 17 UNIT/L (ref 5–34)
BASOPHILS # BLD AUTO: 0.02 X10(3)/MCL
BASOPHILS NFR BLD AUTO: 0.3 %
BILIRUB SERPL-MCNC: 0.3 MG/DL
BUN SERPL-MCNC: 9.2 MG/DL (ref 9.8–20.1)
CALCIUM SERPL-MCNC: 9.5 MG/DL (ref 8.4–10.2)
CHLORIDE SERPL-SCNC: 108 MMOL/L (ref 98–107)
CO2 SERPL-SCNC: 28 MMOL/L (ref 22–29)
CREAT SERPL-MCNC: 0.73 MG/DL (ref 0.55–1.02)
CREAT/UREA NIT SERPL: 13
EOSINOPHIL # BLD AUTO: 0.05 X10(3)/MCL (ref 0–0.9)
EOSINOPHIL NFR BLD AUTO: 0.8 %
ERYTHROCYTE [DISTWIDTH] IN BLOOD BY AUTOMATED COUNT: 14.7 % (ref 11.5–17)
FERRITIN SERPL-MCNC: 145.95 NG/ML (ref 4.63–204)
GFR SERPLBLD CREATININE-BSD FMLA CKD-EPI: >60 ML/MIN/1.73/M2
GLOBULIN SER-MCNC: 3.4 GM/DL (ref 2.4–3.5)
GLUCOSE SERPL-MCNC: 99 MG/DL (ref 74–100)
HCT VFR BLD AUTO: 39.1 % (ref 37–47)
HGB BLD-MCNC: 12.7 G/DL (ref 12–16)
IMM GRANULOCYTES # BLD AUTO: 0 X10(3)/MCL (ref 0–0.04)
IMM GRANULOCYTES NFR BLD AUTO: 0 %
IRON SATN MFR SERPL: 35 % (ref 20–50)
IRON SERPL-MCNC: 88 UG/DL (ref 50–170)
LYMPHOCYTES # BLD AUTO: 2.59 X10(3)/MCL (ref 0.6–4.6)
LYMPHOCYTES NFR BLD AUTO: 41 %
MCH RBC QN AUTO: 28.8 PG (ref 27–31)
MCHC RBC AUTO-ENTMCNC: 32.5 G/DL (ref 33–36)
MCV RBC AUTO: 88.7 FL (ref 80–94)
MONOCYTES # BLD AUTO: 0.43 X10(3)/MCL (ref 0.1–1.3)
MONOCYTES NFR BLD AUTO: 6.8 %
NEUTROPHILS # BLD AUTO: 3.23 X10(3)/MCL (ref 2.1–9.2)
NEUTROPHILS NFR BLD AUTO: 51.1 %
PLATELET # BLD AUTO: 340 X10(3)/MCL (ref 130–400)
PMV BLD AUTO: 10.4 FL (ref 7.4–10.4)
POTASSIUM SERPL-SCNC: 4.2 MMOL/L (ref 3.5–5.1)
PROT SERPL-MCNC: 7.2 GM/DL (ref 6.4–8.3)
RBC # BLD AUTO: 4.41 X10(6)/MCL (ref 4.2–5.4)
SODIUM SERPL-SCNC: 143 MMOL/L (ref 136–145)
TIBC SERPL-MCNC: 161 UG/DL (ref 70–310)
TIBC SERPL-MCNC: 249 UG/DL (ref 250–450)
TRANSFERRIN SERPL-MCNC: 225 MG/DL (ref 180–382)
WBC # BLD AUTO: 6.32 X10(3)/MCL (ref 4.5–11.5)

## 2024-10-01 PROCEDURE — 82728 ASSAY OF FERRITIN: CPT

## 2024-10-01 PROCEDURE — 80053 COMPREHEN METABOLIC PANEL: CPT

## 2024-10-01 PROCEDURE — 85025 COMPLETE CBC W/AUTO DIFF WBC: CPT

## 2024-10-01 PROCEDURE — 36415 COLL VENOUS BLD VENIPUNCTURE: CPT

## 2024-10-01 PROCEDURE — 83550 IRON BINDING TEST: CPT

## 2024-10-01 PROCEDURE — 83540 ASSAY OF IRON: CPT

## 2024-10-04 ENCOUNTER — OFFICE VISIT (OUTPATIENT)
Dept: HEMATOLOGY/ONCOLOGY | Facility: CLINIC | Age: 54
End: 2024-10-04
Payer: OTHER GOVERNMENT

## 2024-10-04 VITALS
OXYGEN SATURATION: 96 % | HEIGHT: 64 IN | TEMPERATURE: 98 F | HEART RATE: 89 BPM | WEIGHT: 178.5 LBS | RESPIRATION RATE: 18 BRPM | BODY MASS INDEX: 30.48 KG/M2 | SYSTOLIC BLOOD PRESSURE: 137 MMHG | DIASTOLIC BLOOD PRESSURE: 89 MMHG

## 2024-10-04 DIAGNOSIS — Z17.0 MALIGNANT NEOPLASM OF UPPER-OUTER QUADRANT OF RIGHT BREAST IN FEMALE, ESTROGEN RECEPTOR POSITIVE: Primary | ICD-10-CM

## 2024-10-04 DIAGNOSIS — D50.0 IRON DEFICIENCY ANEMIA DUE TO CHRONIC BLOOD LOSS: ICD-10-CM

## 2024-10-04 DIAGNOSIS — C50.411 MALIGNANT NEOPLASM OF UPPER-OUTER QUADRANT OF RIGHT BREAST IN FEMALE, ESTROGEN RECEPTOR POSITIVE: Primary | ICD-10-CM

## 2024-10-04 PROBLEM — E66.01 MORBID OBESITY: Status: RESOLVED | Noted: 2022-10-04 | Resolved: 2024-10-04

## 2024-10-04 PROCEDURE — 99999 PR PBB SHADOW E&M-EST. PATIENT-LVL V: CPT | Mod: PBBFAC,,, | Performed by: NURSE PRACTITIONER

## 2024-10-04 PROCEDURE — 99215 OFFICE O/P EST HI 40 MIN: CPT | Mod: PBBFAC | Performed by: NURSE PRACTITIONER

## 2024-10-04 RX ORDER — LETROZOLE 2.5 MG/1
2.5 TABLET, FILM COATED ORAL DAILY
Qty: 90 TABLET | Refills: 0 | Status: SHIPPED | OUTPATIENT
Start: 2024-10-04

## 2024-10-04 NOTE — PROGRESS NOTES
Subjective:       Patient ID: Brenden Mccauley is a 54 y.o. female.    Surgeon: Dr. Christina Romero    1. Right Breast Cancer Stage IA (T2(m2) N0(I+)M0)--Diagnosed 24    2. Iron Deficiency Anemia h/o Gastric sleeve in     Biopsy/pathology: Right and left breast biopsies done 24--10:00 4CMFN mass with invasive mammary carcinoma with mixed ductal and lobular features, Grade 2, at least 9mm, associated calcifications, ER 86%, NE 80%, Her2 1+ negative/low, Ki67 23%, 3:00 middle depth calcifications with stromal fibrosis, sclerosing adenosis; Left breast UIQ middle depth calcifications with stromal fibrosis, apocrine metaplasia, sclerosing adenosis, fibroadenomatoid change.  Surgery/pathology:  Bilateral mastectomies done 3/15/24--Right breast with multifocal invasive carcinoma with mixed ductal and lobular features 26mm and 3mm, +PNI, 2/11 lymph nodes with isolated tumor cells; left breast mastectomy with benign breast tissue, fibrocystic changes, site of prior biopsy. Oncotype DX testing with score 14, risk of recurrence with AI alone 4%, no chemotherapy benefit.   Imagin. Screening MMG done 23--on right irregular mass with associated calcifications at 9 o'clock posterior depth.  There are calcifications in the right breast lower inner quadrant, middle depth. There is an asymmetry in the medial right breast, anterior depth; on left calcifications noted at 3 o'clock, posterior depth, in the medial left breast, anterior to middle depth.  There is an asymmetry with possible associated architectural distortion in the medial left breast, middle depth. BIRADS-0; additional imaging needed.     Bilateral diagnostic MMG/US done 1/3/24--right MMG persistent 1.4cm irregular mass with spiculated margins and associated architectural distortion at 10:00, diffuse calcifications throughout right breast, on US 1.4cm irregular hypoechoic mass, right axilla with no adenopathy; left MMG with diffuse  calcifications left breast most concentrated in UI and UO quadrants, assymetry in left breast effaced out, left US with two morphologically similar parallel oval avascular hypoechoic masses with circumscribed margins  measuring 0.9 cm x 0.2 cm x 0.5 cm; at 4 o'clock 5 cm FN and 1.0 cm x 0.3 cm x 0.8 cm; at 4 o'clock 3 cm from the nipple, masses are without significant change mammographically dating back at least to the 2021 exam, and are also likely without significant change dating back to the 2017 exam allowing for differences in technique and interval reduction mammoplast. No suspicious sonographic abnormality is evident in the left breast.. Left axilla demonstrates no significant left axillary adenopathy. BIRADS-4 suspicious; biopsy recommended.  Breast MRI 2/8/24-- Right breast upper outer quadrant, lower outer quadrant, and central region posterior to middle depths irregular heterogeneously enhancing spiculated mass with regional clumped non mass enhancement extending away superiorly, inferiorly, and anteriorly measures 5.7 x 2.6 x 7.2 cm ([id number]) in total extent and correlates with biopsy-proven malignancy.  BI-RADS 6: Known biopsy-proven malignancy.  No abnormal enhancement associated with the recent benign stereotactic guided core needle biopsy sites for calcifications in both breasts (right breast 3:00 axis middle depth and left breast upper inner quadrant middle depth).  There are multiple hematomas in the right breast upper inner and lower inner quadrants in the anterior depth related to the 3:00 axis middle depth core needle biopsy.  BI-RADS 2: Benign. No MRI evidence of malignancy in the left breast.    [employer] BRCA1 and BRCA2 panel, and 84 individual genes with +heterozygous for BLM mutation, pathogenic variant (predisposition to colorectal cancer), POLE heterozygous mutation VUS.     DEXA:  06/17/24--AP spine 0.6, left femoral neck -0.6, left total -0.2, right femoral neck -0.4, right total -0.1.  Normal bone density.     Treatment history:  Bilateral mastectomies with right SLN biopsies with tissue expander reconstruction done 3/15/24.   InFeD 1500mg x 1 dose on 24.     Current treatment plan:   Adjuvant Femara started on 24.     Chief Complaint: 3 Month Follow Up    HPI  Patient presents for follow-up of breast cancer. She is doing well. Currently on  Femara since 2024. Tolerating well with the exception of hot flashes. Otherwise no side effects. Baseline DEXA with normal bone density. She is still undergoing breast reconstruction with Dr. David. Received InFeD x 1 dose in May 2024 and anemia has since resolved. Reports that she is still able to refrain from napping since receiving IV iron. No new problems reported.     Past Medical History:   Diagnosis Date    Abnormal uterine bleeding     Anemia     Anxiety     Cancer     right breast    Depression     Diabetes mellitus     Endometriosis of uterus     Fibroid     Heart attack     Hyperlipidemia     Hypertension 10/04/2022    Menopause 2017    Obesity, unspecified     BMI 32.10    Pregnancy     Miscarriage    Sleep apnea     no cpap      Past Surgical History:   Procedure Laterality Date    ABDOMINAL SURGERY      APPENDECTOMY      BREAST SURGERY Bilateral     breast reduction    CERVICAL BIOPSY  W/ LOOP ELECTRODE EXCISION      CERVIX LESION DESTRUCTION       SECTION      COSMETIC SURGERY      Breast reduction    DILATION AND CURETTAGE OF UTERUS      ENDOMETRIAL ABLATION      gastric sleeve      HYSTERECTOMY      HYSTEROSCOPY      INSERTION OF BREAST TISSUE EXPANDER Bilateral 3/15/2024    Procedure: INSERTION, TISSUE EXPANDER, BREAST (bilateral breast recon with placement of tissue expanders and acellular dermal matrix, use of intraoperative fluoroscein angiography);  Surgeon: Ubaldo David MD;  Location: [facility];  Service: Plastics;  Laterality: Bilateral;  300mL filled for each tissue expander     JOINT REPLACEMENT  2020/2022    bilateral knees    LUMBAR LAMINECTOMY N/A 05/29/2023    Procedure: LAMINECTOMY, SPINE, LUMBAR L4 SNS:SSEP/EMG;  Surgeon: Kamlesh Gomez MD;  Location: [facility];  Service: Orthopedics;  Laterality: N/A;    LUMBAR LAMINECTOMY N/A 08/22/2023    Procedure: LAMINECTOMY, SPINE, LUMBAR L4;  Surgeon: Kamlesh Gomez MD;  Location: [address];  Service: Neurosurgery;  Laterality: N/A;    RECONSTRUCTION OF BREAST WITH DEEP INFERIOR EPIGASTRIC ARTERY  (KENNETH) FLAP Bilateral 7/23/2024    Procedure: RECONSTRUCTION, BREAST, USING KENNETH SKIN FLAP (Bilateral breast recon with KENNETH flaps, possible TRAM flaps, possible ventral hernia with mesh, possible rectus muscle plication, use of intraoperative fluoroscein angiography);  Surgeon: Ubaldo David MD;  Location: [facility];  Service: Plastics;  Laterality: Bilateral;    SENTINEL LYMPH NODE BIOPSY Right 3/15/2024    Procedure: BIOPSY, LYMPH NODE, SENTINEL - RIGHT MagTrace Injected in office SentiMag needed No NUC MED;  Surgeon: Christina Romero MD;  Location: [facility] OR;  Service: General;  Laterality: Right;  MagTrace Injected in office  SentiMag needed  No NUC MED    SIMPLE MASTECTOMY Bilateral 3/15/2024    Procedure: MASTECTOMY, SIMPLE - BILATERAL;  Surgeon: Christina Romero MD;  Location: [facility] OR;  Service: General;  Laterality: Bilateral;    SPINE SURGERY  2023    TISSUE EXPANDER REMOVAL Bilateral 7/23/2024    Procedure: REMOVAL, TISSUE EXPANDER;  Surgeon: Ubaldo David MD;  Location: [facility];  Service: Plastics;  Laterality: Bilateral;    TONSILLECTOMY      TOTAL KNEE ARTHROPLASTY Bilateral     TUBAL LIGATION  1992    WISDOM TOOTH EXTRACTION       Family History   Problem Relation Name Age of Onset    Diabetes type I Mother Paul Hamm     Multiple myeloma Mother Paul Hamm 55    Diabetes Mother Paul Hamm     Hypertension Mother Paul Hamm     Kidney disease Mother Paul Hamm     Alzheimer's disease Father               No Known Problems Sister      No Known Problems Brother      Pancreatic cancer Maternal Grandmother  70    Breast cancer Paternal Grandmother Claudette Hamm 55    Liver cancer Paternal Grandmother Claudette Hamm 55    Breast cancer Paternal Aunt Lesley Fulton 62     Social History     Socioeconomic History    Marital status: Single   Tobacco Use    Smoking status: Former     Average packs/day: 0.5 packs/day for 5.0 years (2.5 ttl pk-yrs)     Types: Cigarettes     Start date: 2018     Quit date: 2018     Years since quittin.7    Smokeless tobacco: Never    Tobacco comments:     1 pk every 3 days    Substance and Sexual Activity    Alcohol use: Not Currently     Alcohol/week: 1.0 standard drink of alcohol     Types: 1 Glasses of wine per week    Drug use: Never    Sexual activity: Not Currently     Partners: Male     Birth control/protection: Abstinence     Social Drivers of Health     Financial Resource Strain: Low Risk  (2024)    Received from [facility] and Its Subsidiaries and Affiliates    Overall Financial Resource Strain (CARDIA)     Difficulty of Paying Living Expenses: Not hard at all   Food Insecurity: No Food Insecurity (2024)    Received from [facility] and Its Subsidiaries and Affiliates    Hunger Vital Sign     Worried About Running Out of Food in the Last Year: Never true     Ran Out of Food in the Last Year: Never true   Transportation Needs: No Transportation Needs (2024)    Received from [facility] Its Subsidiaries and Affiliates    PRAPARE - Transportation     Lack of Transportation (Medical): No     Lack of Transportation (Non-Medical): No   Physical Activity: Inactive (2024)    Received from [facility] Its Subsidiaries and Affiliates    Exercise Vital Sign     Days of Exercise per  Week: 0 days     Minutes of Exercise per Session: 0 min   Stress: Stress Concern Present (4/22/2024)    Received from [facility] Subsidiaries and Affiliates    [employer] [place] [employer] - Occupational Stress Questionnaire     Feeling of Stress : Rather much       Review of patient's allergies indicates:   Allergen Reactions    Latex, natural rubber Hives, Anxiety, Itching, Palpitations and Swelling     Other Reaction(s): Unknown    Tomato (solanum lycopersicum) Hives and Swelling      Current Outpatient Medications on File Prior to Visit   Medication Sig Dispense Refill    aspirin (ECOTRIN) 81 MG EC tablet Take 81 mg by mouth once daily.      azelastine (ASTELIN) 137 mcg (0.1 %) nasal spray SMARTSIG:Both Nares      blood sugar diagnostic (ONETOUCH ULTRA TEST) Strp CHECK SUGARS DAILY      blood sugar diagnostic (TRUE METRIX GLUCOSE TEST STRIP) Strp CHECK SUGARS DAILY      blood-glucose meter kit Check sugars daily. For T2DM without insulin (E11.9)      busPIRone (BUSPAR) 30 MG Tab Take 30 mg by mouth 2 (two) times a day.      carvediloL (COREG) 3.125 MG tablet Take 3.125 mg by mouth 2 (two) times daily.      celecoxib (CELEBREX) 200 MG capsule       FEROSUL 325 mg (65 mg iron) Tab tablet Take by mouth every other day.      fexofenadine (ALLEGRA) 180 MG tablet Take 1 tablet by mouth every morning.      fluoride, sodium, (PREVIDENT 5000 PLUS) 1.1 % Crea       ipratropium (ATROVENT) 42 mcg (0.06 %) nasal spray by Each Nostril route.      lamoTRIgine (LAMICTAL) 150 MG Tab TAKE ONE TABLET BY MOUTH AT BEDTIME SEIZURES FOR MOOD STABILIZATION      letrozole (FEMARA) 2.5 mg Tab TAKE 1 TABLET BY MOUTH EVERY DAY 90 tablet 0    metFORMIN (GLUCOPHAGE) 500 MG tablet Take 500 mg by mouth 2 (two) times daily with meals.      montelukast (SINGULAIR) 10 mg tablet Take 10 mg by mouth once daily.      ondansetron (ZOFRAN-ODT) 4 MG TbDL Take by mouth.      pantoprazole  "(PROTONIX) 40 MG tablet TAKE ONE TABLET BY MOUTH EVERY DAY FOR ACID REFLUX      rosuvastatin (CRESTOR) 10 MG tablet Take 10 mg by mouth once daily.      sertraline 200 mg Cap Take 200 mg by mouth once daily.      SODIUM FLUORIDE 5000 DRY MOUTH 1.1 % Pste       traZODone (DESYREL) 100 MG tablet Take 100 mg by mouth every evening.      vitamin D (VITAMIN D3) 1000 units Tab Take 1,000 Units by mouth once daily.      white petrolatum-mineral oil 56.8-42.5% (LACRI-LUBE S.O.P.) 56.8-42.5 % Oint Place into the left eye every evening. 15 g 0    valACYclovir (VALTREX) 1000 MG tablet Take 1 tablet (1,000 mg total) by mouth 3 (three) times daily. for 7 days 21 tablet 0    [DISCONTINUED] meloxicam (MOBIC) 15 MG tablet Take 15 mg by mouth once daily. (Patient not taking: Reported on 10/4/2024)       No current facility-administered medications on file prior to visit.      Review of Systems   Constitutional:  Negative for appetite change and unexpected weight change.   HENT:  Negative for mouth sores.    Eyes:  Negative for visual disturbance.   Respiratory:  Negative for cough and shortness of breath.    Cardiovascular:  Negative for chest pain.   Gastrointestinal:  Negative for abdominal pain and diarrhea.   Genitourinary:  Negative for frequency.   Musculoskeletal:  Negative for back pain.   Integumentary:  Negative for rash.   Neurological:  Negative for headaches.   Hematological:  Negative for adenopathy.   Psychiatric/Behavioral:  The patient is not nervous/anxious.          Vitals:    10/04/24 1027   BP: 137/89   Pulse: 89   Resp: 18   Temp: 98.2 °F (36.8 °C)   TempSrc: Oral   SpO2: 96%   Weight: 81 kg (178 lb 8 oz)   Height: 5' 4" (1.626 m)       Physical Exam  Constitutional:       Appearance: Normal appearance.   HENT:      Head: Normocephalic.      Nose: Nose normal.      Mouth/Throat:      Mouth: Mucous membranes are moist.   Eyes:      Extraocular Movements: Extraocular movements intact.      Conjunctiva/sclera: " Conjunctivae normal.   Cardiovascular:      Rate and Rhythm: Normal rate and regular rhythm.   Pulmonary:      Effort: Pulmonary effort is normal.      Breath sounds: Normal breath sounds.   Chest:      Comments: S/p bilateral mastectomies with tissue expanders in place.   Abdominal:      General: Bowel sounds are normal. There is no distension.      Palpations: Abdomen is soft.      Tenderness: There is no abdominal tenderness.   Musculoskeletal:         General: Normal range of motion.   Skin:     General: Skin is warm.   Neurological:      General: No focal deficit present.      Mental Status: She is alert and oriented to person, place, and time.   Psychiatric:         Mood and Affect: Mood normal.         Judgment: Judgment normal.     Lab Visit on 10/01/2024   Component Date Value Ref Range Status    Sodium 10/01/2024 143  136 - 145 mmol/L Final    Potassium 10/01/2024 4.2  3.5 - 5.1 mmol/L Final    Chloride 10/01/2024 108 (H)  98 - 107 mmol/L Final    CO2 10/01/2024 28  22 - 29 mmol/L Final    Glucose 10/01/2024 99  74 - 100 mg/dL Final    Blood Urea Nitrogen 10/01/2024 9.2 (L)  9.8 - 20.1 mg/dL Final    Creatinine 10/01/2024 0.73  0.55 - 1.02 mg/dL Final    Calcium 10/01/2024 9.5  8.4 - 10.2 mg/dL Final    Protein Total 10/01/2024 7.2  6.4 - 8.3 gm/dL Final    Albumin 10/01/2024 3.8  3.5 - 5.0 g/dL Final    Globulin 10/01/2024 3.4  2.4 - 3.5 gm/dL Final    Albumin/Globulin Ratio 10/01/2024 1.1  1.1 - 2.0 ratio Final    Bilirubin Total 10/01/2024 0.3  <=1.5 mg/dL Final    ALP 10/01/2024 111  40 - 150 unit/L Final    ALT 10/01/2024 13  0 - 55 unit/L Final    AST 10/01/2024 17  5 - 34 unit/L Final    eGFR 10/01/2024 >60  mL/min/1.73/m2 Final    Anion Gap 10/01/2024 7.0  mEq/L Final    BUN/Creatinine Ratio 10/01/2024 13   Final    Iron Binding Capacity Unsaturated 10/01/2024 161  70 - 310 ug/dL Final    Iron Level 10/01/2024 88  50 - 170 ug/dL Final    Transferrin 10/01/2024 225  180 - 382 mg/dL Final    Iron  Binding Capacity Total 10/01/2024 249 (L)  250 - 450 ug/dL Final    Iron Saturation 10/01/2024 35  20 - 50 % Final    Ferritin Level 10/01/2024 145.95  4.63 - 204.00 ng/mL Final    WBC 10/01/2024 6.32  4.50 - 11.50 x10(3)/mcL Final    RBC 10/01/2024 4.41  4.20 - 5.40 x10(6)/mcL Final    Hgb 10/01/2024 12.7  12.0 - 16.0 g/dL Final    Hct 10/01/2024 39.1  37.0 - 47.0 % Final    MCV 10/01/2024 88.7  80.0 - 94.0 fL Final    MCH 10/01/2024 28.8  27.0 - 31.0 pg Final    MCHC 10/01/2024 32.5 (L)  33.0 - 36.0 g/dL Final    RDW 10/01/2024 14.7  11.5 - 17.0 % Final    Platelet 10/01/2024 340  130 - 400 x10(3)/mcL Final    MPV 10/01/2024 10.4  7.4 - 10.4 fL Final    Neut % 10/01/2024 51.1  % Final    Lymph % 10/01/2024 41.0  % Final    Mono % 10/01/2024 6.8  % Final    Eos % 10/01/2024 0.8  % Final    Basophil % 10/01/2024 0.3  % Final    Lymph # 10/01/2024 2.59  0.6 - 4.6 x10(3)/mcL Final    Neut # 10/01/2024 3.23  2.1 - 9.2 x10(3)/mcL Final    Mono # 10/01/2024 0.43  0.1 - 1.3 x10(3)/mcL Final    Eos # 10/01/2024 0.05  0 - 0.9 x10(3)/mcL Final    Baso # 10/01/2024 0.02  <=0.2 x10(3)/mcL Final    IG# 10/01/2024 0.00  0 - 0.04 x10(3)/mcL Final    IG% 10/01/2024 0.0  % Final         Assessment:       1. Malignant neoplasm of upper-outer quadrant of right breast in female, estrogen receptor positive        Plan:       Patient with right breast cancer s/p bilateral mastectomies done 3/15/24. Multifocal breast cancer, mixed ductal and lobular, 26mm and 3mm, with +PNI, and 2/11 lymph nodes with ITC, ER and PA strongly positive and Her2 negative with Ki67 23%.  Per NCCN, recommended to have Oncotype, and score returned low risk 14.   No radiation recommended due to only isolated tumor cells in LN.  Patient is post-menopausal, JENNIFER age 40 for fibroids and endometriosis, and was on +HRT when she was diagnosed as post-menopausal since age 44. HRT stopped at diagnosis.     Started Femara on 4/26/24. Tolerating well.   CBC today is good,  anemia resolved. CMP unremarkable.   Baseline DEXA on 6/17/24 with normal bone density.   Continue Calcium and Vitamin D3.     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 There are no clear guidelines for screening but suggested to start screening colonoscopy age 40 and continue every 5 years. She has not had one in several years. She was referred back to GI but [facility] from [facility].     She has known MARC.  She has a h/o gastric sleeve surgery in 2020, so suspect this is the culprit.  Given InFeD 1500mg x 1 dose on 5/1/24.   CBC ON 10/1/24 with resolved anemia.     All questions answered at this time.      TAMIKA Valencia

## 2024-10-11 ENCOUNTER — DOCUMENT SCAN (OUTPATIENT)
Dept: HOME HEALTH SERVICES | Facility: HOSPITAL | Age: 54
End: 2024-10-11
Payer: OTHER GOVERNMENT

## 2024-12-30 NOTE — PROGRESS NOTES
[facility] Breast Surg  Breast Surgical Oncology  Follow-Up Patient Office Visit       Referring Provider: No ref. 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     Interval History:  2024 - Brenden Franklin Parisa presents for a 6 month follow up. She has been doing well.  She had reconstruction earlier this year but did have healing complications.  She has fat necrosis throughout the entire left reconstructed breast and some portions of the right.  She reports pain on the left side.  She returns for surgery again with Dr. Joann galeas.    HPI:  Brenden Franklin Parisa is a 53 y.o. female who presents on 2024 for evaluation of newly diagnosed right  breast cancer. She has a history of reduction mammoplasty in  as well as previous right breast biopsy.     A detailed patient history was obtained and reviewed. She currently denies any breast issues including rashes, redness, pain, swelling, nipple discharge, or new lumps/masses.    MG breast density: Category C (heterogeneously dense)     Imagin2023 BL SC MG at [facility]= which revealed on R mg, there is an irregular mass with associated calcifications at 9 o'clock posterior depth.  There are calcifications in the right breast lower inner quadrant, middle depth. There is an asymmetry in the medial right breast, anterior depth.     On L MG, there are  calcifications noted at 3 o'clock, posterior depth, in the medial left breast, anterior to middle depth.  There is an asymmetry with possible associated architectural distortion in the medial left breast, middle depth. BIRADS-0; additional imaging needed    2. 1/3/2024 BL DG MG/ BL US BREAST COMPLETE at [facility]-        Right MG- at 10 o'clock middle/posterior depth, there is a persistent 1.4 cm irregular mass with spiculated margins and associated architectural distortion. There are punctate and amorphous calcifications associated with this mass. There are heterogeneous, punctate, and amorphous calcifications in a diffuse distribution throughout the right breast.  The most concentrated region of calcifications is located in the central/superior medial right breast anterior to middle depths. The asymmetry in the medial breast, anterior depth, effaces into normal-appearing fibroglandular tissue.   There are diffuse post surgical changes of the right breast related to previous reduction mammoplasty.          Right US-  At 10 o'clock 4 cm FN, there is a 1.4 cm x 0.9 cm x 1.4 cm irregular hypoechoic mass with spiculated and indistinct margins, posterior shadowing, associated architectural distortion, and associated echogenic calcifications. Benign simple cysts seen at 11 o'clock and 1 o'clock subareolar. The right axilla demonstrates no significant right axillary adenopathy.        Left MG- there are heterogeneous, punctate, and amorphous calcifications in a diffuse distribution throughout the left breast. The most concentrated groupings of calcifications are located in the upper inner and upper outer quadrants of the left breast middle/posterior depth. The asymmetry with possible associated architectural distortion seen in the medial left breast effaced into normal-appearing fibroglandular tissue on spot compression tomosynthesis images.        Left US- there are two morphologically similar parallel oval avascular  hypoechoic masses with circumscribed margins  measuring 0.9 cm x 0.2 cm x 0.5 cm; at 4 o'clock 5 cm FN and 1.0 cm x 0.3 cm x 0.8 cm; at 4 o'clock 3 cm from the nipple.  These masses are without significant change mammographically dating back at least to the 2021 exam, and are also likely without significant change dating back to the 2017 exam allowing for differences in technique and interval reduction mammoplasty.  No suspicious sonographic abnormality is evident in the left breast.. Left axilla demonstrates no significant left axillary adenopathy. BIRADS-4 suspicious;biopsy recommended     3. 2/8/2024 BL Breast MRI -   MRI Breast w/wo Contrast, w/CAD, Bilateral 02/08/2024  IMPRESSION: KNOWN BIOPSY PROVEN MALIGNANCY  1) Right breast upper outer quadrant, lower outer quadrant, and central region posterior to middle depths irregular heterogeneously enhancing spiculated mass with regional clumped non mass enhancement extending away superiorly, inferiorly, and anteriorly measures 5.7 x 2.6 x 7.2 cm ([id number]) in total extent and correlates with biopsy-proven malignancy.  BI-RADS 6: Known biopsy-proven malignancy.    2) No abnormal enhancement associated with the recent benign stereotactic guided core needle biopsy sites for calcifications in both breasts (right breast 3:00 axis middle depth and left breast upper inner quadrant middle depth).  There are multiple hematomas in the right breast upper inner and lower inner quadrants in the anterior depth related to the 3:00 axis middle depth core needle biopsy.  BI-RADS 2: Benign.    3) No MRI evidence of malignancy in the left breast.      Pathology:  1/25/2024 Ultrasound-guided Core Needle Biopsy Right Breast Mass 10 o'clock 4 cm FN-        - Invasive mammary carcinoma with mixed ductal and lobular features, grade 2 (measuring 9 mm) with associated calcifications          ER 86%          MA 80%          HER2 aden 1+          Ki67 23%    2. 1/25/2024 Stereotactic guided  Core Needle Biopsy Right Breast 3 o'clock Calcifications-       -Stromal fibrosis, cystic dilation of ducts, sclerosing adenosis and microcalcifications         -No evidence of malignancy    3. 2024 Stereotactic guided Core Needle Biopsy Left Breast UIQ Middle Depth Calcifications       - Stromal fibrosis, cystic dilation of ducts, apocrine metaplasia, sclerosing adenosis, fibroadenomatoid change, microcalcifications, and mild ductal epithelial hyperplasia of the usual type        -No evidence of malignancy    4. 3/15/2024 Bilateral Mastectomy with Right Malcolm Lymph Node Biopsy which revealed-   Right Breast- multifocal invasive carcinoma with mixed ductal and lobular features (measuring 26 mm, 3.0 mm); fibroadenoma; calcifications within invasive carcinoma within benign lobules; perineural invasion by carcinoma. All margins negative for invasive carcinoma.  Eleven sentinel lymph nodes all negative except 2 with isolated tumor cells.    Left Breast- benign breast tissue with fibrocystic changes; calcifications within benign lobules        OB/GYN History:  Age at Menarche Onset: 10  Menopausal Status: postmenopausal, LMP: No LMP recorded. Patient has had a hysterectomy.  Hysterectomy/Oophorectomy: hysterectomy without BSO, at age 40  Hormonal birth control (duration): Yes OCP (estrogen/progesterone).   Pregnancy History:   Age at first live birth: 18  Hormone Replacement Therapy: Yes, Oral Estrogen. Started age 44, discontinued once diagnosed with breast cancer  Patient did not breast feed.  Patient admits to to previous breast biopsy- Right Breast 2017 Benign    Other Relevant History:  Prior thoracic RT: none  Genetic testing: No  Ashkenazi Ep[employer] descent: No    Family History:  Family History   Problem Relation Name Age of Onset    Diabetes type I Mother Paul Hamm     Multiple myeloma Mother Paul Hamm 55    Diabetes Mother Paul Hamm     Hypertension Mother Paul Hamm     Kidney  disease Mother Paul Hamm     Alzheimer's disease Father              No Known Problems Sister      No Known Problems Brother      Pancreatic cancer Maternal Grandmother  70    Breast cancer Paternal Grandmother Claudette Hamm 55    Liver cancer Paternal Grandmother Claudette Hamm 55    Breast cancer Paternal Aunt Lesley Oneil        Past History:  Past Medical History:   Diagnosis Date    Abnormal uterine bleeding 2006    Anemia     Anxiety     Cancer     right breast    Depression 1992    Diabetes mellitus     Endometriosis of uterus     Fibroid     Heart attack     Hyperlipidemia     Hypertension 10/04/2022    Menopause 2017    Obesity, unspecified     BMI 32.10    Pregnancy 1987    Miscarriage    Sleep apnea     no cpap        Past Surgical History:   Procedure Laterality Date    ABDOMINAL SURGERY      APPENDECTOMY      BREAST SURGERY Bilateral     breast reduction    CERVICAL BIOPSY  W/ LOOP ELECTRODE EXCISION      CERVIX LESION DESTRUCTION       SECTION      COSMETIC SURGERY      Breast reduction    DILATION AND CURETTAGE OF UTERUS      ENDOMETRIAL ABLATION      gastric sleeve      HYSTERECTOMY      HYSTEROSCOPY      INSERTION OF BREAST TISSUE EXPANDER Bilateral 3/15/2024    Procedure: INSERTION, TISSUE EXPANDER, BREAST (bilateral breast recon with placement of tissue expanders and acellular dermal matrix, use of intraoperative fluoroscein angiography);  Surgeon: Ubaldo David MD;  Location: [facility] OR;  Service: Plastics;  Laterality: Bilateral;  300mL filled for each tissue expander    JOINT REPLACEMENT      bilateral knees    LUMBAR LAMINECTOMY N/A 2023    Procedure: LAMINECTOMY, SPINE, LUMBAR L4 SNS:SSEP/EMG;  Surgeon: Kamlesh Gomez MD;  Location: [facility] OR;  Service: Orthopedics;  Laterality: N/A;    LUMBAR LAMINECTOMY N/A 2023    Procedure: LAMINECTOMY, SPINE, LUMBAR L4;  Surgeon: Kamlesh Gomez MD;  [facility];   Service: Neurosurgery;  Laterality: N/A;    RECONSTRUCTION OF BREAST WITH DEEP INFERIOR EPIGASTRIC ARTERY  (KENNETH) FLAP Bilateral 2024    Procedure: RECONSTRUCTION, BREAST, USING KENNETH SKIN FLAP (Bilateral breast recon with KENNETH flaps, possible TRAM flaps, possible ventral hernia with mesh, possible rectus muscle plication, use of intraoperative fluoroscein angiography);  Surgeon: Ubaldo David MD;  Location: [facility] OR;  Service: Plastics;  Laterality: Bilateral;    SENTINEL LYMPH NODE BIOPSY Right 3/15/2024    Procedure: BIOPSY, LYMPH NODE, SENTINEL - RIGHT MagTrace Injected in office SentiMag needed No NUC MED;  Surgeon: Christina Romero MD;  Location: [facility] OR;  Service: General;  Laterality: Right;  MagTrace Injected in office  SentiMag needed  No NUC MED    SIMPLE MASTECTOMY Bilateral 3/15/2024    Procedure: MASTECTOMY, SIMPLE - BILATERAL;  Surgeon: Christina Romero MD;  Location: [facility] OR;  Service: General;  Laterality: Bilateral;    SPINE SURGERY      TISSUE EXPANDER REMOVAL Bilateral 2024    Procedure: REMOVAL, TISSUE EXPANDER;  Surgeon: Ubaldo David MD;  Location: [facility] OR;  Service: Plastics;  Laterality: Bilateral;    TONSILLECTOMY      TOTAL KNEE ARTHROPLASTY Bilateral     TUBAL LIGATION  1992    WISDOM TOOTH EXTRACTION          Social History     Socioeconomic History    Marital status: Single   Tobacco Use    Smoking status: Former     Average packs/day: 0.5 packs/day for 5.0 years (2.5 ttl pk-yrs)     Types: Cigarettes     Start date: 2018     Quit date: 2018     Years since quittin.0    Smokeless tobacco: Never    Tobacco comments:     1 pk every 3 days    Substance and Sexual Activity    Alcohol use: Not Currently     Alcohol/week: 1.0 standard drink of alcohol     Types: 1 Glasses of wine per week    Drug use: Never    Sexual activity: Not Currently     Partners: Male     Birth control/protection: Abstinence     Social Drivers of Health     Financial  Resource Strain: Low Risk  (12/9/2024)    Overall Financial Resource Strain (CARDIA)     Difficulty of Paying Living Expenses: Not hard at all   Food Insecurity: No Food Insecurity (12/9/2024)    Hunger Vital Sign     Worried About Running Out of Food in the Last Year: Never true     Ran Out of Food in the Last Year: Never true   Transportation Needs: No Transportation Needs (4/22/2024)    [facility]    [facility] - [facility]     Lack of Transportation (Medical): No     Lack of Transportation (Non-Medical): No   Physical Activity: Insufficiently Active (12/9/2024)    Exercise Vital Sign     Days of Exercise per Week: 3 days     Minutes of Exercise per Session: 20 min   Stress: No Stress Concern Present (12/9/2024)    [employer] - Occupational Stress Questionnaire     Feeling of Stress : Only a little   Housing Stability: Unknown (12/9/2024)    Housing Stability Vital Sign     Unable to Pay for Housing in the Last Year: No        Body mass index is 29.97 kg/m².     Allergy/Medications:   Review of patient's allergies indicates:   Allergen Reactions    Latex, natural rubber Hives, Anxiety, Itching, Palpitations and Swelling     Other Reaction(s): Unknown    Tomato (solanum lycopersicum) Hives and Swelling          Current Outpatient Medications:     aspirin (ECOTRIN) 81 MG EC tablet, Take 81 mg by mouth once daily., Disp: , Rfl:     azelastine (ASTELIN) 137 mcg (0.1 %) nasal spray, SMARTSIG:Both Nares, Disp: , Rfl:     blood sugar diagnostic (ONETOUCH ULTRA TEST) Strp, CHECK SUGARS DAILY, Disp: , Rfl:     blood sugar diagnostic (TRUE METRIX GLUCOSE TEST STRIP) Strp, CHECK SUGARS DAILY, Disp: , Rfl:     blood-glucose meter kit, Check sugars daily. For T2DM without insulin (E11.9), Disp: , Rfl:     busPIRone (BUSPAR) 30 MG Tab, Take 30 mg by mouth 2 (two) times a day., Disp: , Rfl:     carvediloL  (COREG) 3.125 MG tablet, Take 3.125 mg by mouth 2 (two) times daily., Disp: , Rfl:     cephALEXin (KEFLEX) 500 MG capsule, Take by mouth., Disp: , Rfl:     chlorhexidine (PERIDEX) 0.12 % solution, GENTLY DAB ON SURGICAL SITE TWICE A DAY FOR 7-10 DAYS, Disp: , Rfl:     diazePAM (VALIUM) 5 MG tablet, Take 5 mg by mouth., Disp: , Rfl:     FEROSUL 325 mg (65 mg iron) Tab tablet, Take by mouth every other day., Disp: , Rfl:     fexofenadine (ALLEGRA) 180 MG tablet, Take 1 tablet by mouth every morning., Disp: , Rfl:     fluoride, sodium, (PREVIDENT 5000 PLUS) 1.1 % Crea, , Disp: , Rfl:     HYDROcodone-acetaminophen (NORCO) 7.5-325 mg per tablet, Take by mouth., Disp: , Rfl:     ipratropium (ATROVENT) 42 mcg (0.06 %) nasal spray, by Each Nostril route., Disp: , Rfl:     lamoTRIgine (LAMICTAL) 150 MG Tab, TAKE ONE TABLET BY MOUTH AT BEDTIME SEIZURES FOR MOOD STABILIZATION, Disp: , Rfl:     letrozole (FEMARA) 2.5 mg Tab, Take 1 tablet (2.5 mg total) by mouth once daily., Disp: 90 tablet, Rfl: 0    metFORMIN (GLUCOPHAGE) 500 MG tablet, Take 500 mg by mouth 2 (two) times daily with meals., Disp: , Rfl:     montelukast (SINGULAIR) 10 mg tablet, Take 10 mg by mouth once daily., Disp: , Rfl:     NINJACOF 12.5-12.5 mg/5 mL Liqd, PRN, Disp: , Rfl:     pantoprazole (PROTONIX) 40 MG tablet, TAKE ONE TABLET BY MOUTH EVERY DAY FOR ACID REFLUX, Disp: , Rfl:     rosuvastatin (CRESTOR) 20 MG tablet, Take 10 mg by mouth., Disp: , Rfl:     senna (SENOKOT) 8.6 mg tablet, Take 2 tablets by mouth once daily., Disp: , Rfl:     sertraline (ZOLOFT) 100 MG tablet, Take 2 tablets by mouth once daily., Disp: , Rfl:     SODIUM FLUORIDE 5000 DRY MOUTH 1.1 % Pste, , Disp: , Rfl:     sumatriptan (IMITREX) 100 MG tablet, Take 100 mg by mouth., Disp: , Rfl:     traMADoL (ULTRAM) 50 mg tablet, 1 tablet as needed Orally Twice a day, Disp: , Rfl:     traZODone (DESYREL) 150 MG tablet, Take 1 tablet by mouth every evening., Disp: , Rfl:     vitamin D  "(VITAMIN D3) 1000 units Tab, Take 1,000 Units by mouth once daily., Disp: , Rfl:     cetirizine (ZYRTEC) 10 MG tablet, 1 tablet Orally Once a day, Disp: , Rfl:        Review of Systems:  Review of Systems   Constitutional:  Negative for chills, fever and weight loss.   HENT:  Negative for sore throat.    Eyes:  Negative for blurred vision and double vision.   Respiratory:  Negative for cough and shortness of breath.    Cardiovascular:  Negative for chest pain, palpitations and leg swelling.   Gastrointestinal:  Positive for constipation. Negative for abdominal pain, diarrhea, heartburn, nausea and vomiting.        Takes meds 1 / week   Genitourinary:  Negative for dysuria, flank pain, frequency, hematuria and urgency.   Musculoskeletal:  Negative for back pain, joint pain and myalgias.   Neurological:  Positive for headaches. Negative for dizziness.        Been having for years   Endo/Heme/Allergies:  Does not bruise/bleed easily.   Psychiatric/Behavioral:  Negative for depression. The patient is not nervous/anxious.            Objective:     Vitals:  Blood pressure 131/84, pulse 94, temperature 98.1 °F (36.7 °C), temperature source Oral, resp. rate 20, height 5' 4" (1.626 m), weight 79.2 kg (174 lb 9.6 oz), SpO2 99%.      Physical Exam:  General: The patient is awake, alert and oriented times three. The patient is well nourished and in no acute distress.  Neck: There is no evidence of palpable cervical, supraclavicular or axillary adenopathy. The neck is supple. The thyroid is not enlarged.  Musculoskeletal: The patient has a normal range of motion of her bilateral upper extremities.  Chest: Examination of the chest wall fails to reveal any obvious abnormalities. Nonlabored breathing, symmetric expansion.  Breast:  Right:   Examination of right reconstructed breast fails reveals a 3-4 cm well circumscribed, freely-mobile lump in the 9:00 axis most consistent with fat necrosis. The nipple is surgically absent. There " is no skin dimpling with movement of the pectoralis. There are no significant skin changes overlying the breast.   Left:  Examination of the left reconstructed breast reveals hardness/a large dominant mass to the entire left breast consistent with post op fat necrosis.  The nipple is surgically absent. There is no skin dimpling with movement of the pectoralis. There are no significant skin changes overlying the breast.  Abdomen: The abdomen is soft, flat, nontender and nondistended.  Integumentary: no rashes or skin lesions present  Neurologic: cranial nerves intact, no signs of peripheral neurological deficit, motor/sensory function intact    Assessment and Plan:     Encounter Diagnoses   Name Primary?    Malignant neoplasm of upper-outer quadrant of right breast in female, estrogen receptor positive Yes    History of bilateral mastectomy     History of breast reconstruction     Fat necrosis (segmental) of breast               Plan:       RTC in 6 months for CBE/surveillance.    Returns for reconstruction surgery again with Dr. David soon to have fat necrosis excised and revision surgery.    BLM mutation - She was referred back to GI but [facility]. She sees her doctor soon and will request referral.    Continue adjuvant therapy and follow up with oncology.        All of her questions were answered. She was advised to call if she develops any questions or concerns.    Sayda Lozada PA-C      Total time on the date of the visit ranged from 30-39 mins ([id number]). Total time includes both face-to-face and non-face-to-face time personally spent by myself on the day of the visit.    Non-face-to-face time included:  _X_ preparing to see the patient such as reviewing the patient record  __ obtaining and reviewing separately obtained history  _X_ independently interpreting results  _X_ documenting clinical information in electronic health record.    Face-to-face time included:  _X_ performing an  appropriate history and examination  _X_ communicating results to the patient  _X_ counseling and educating the patient  __ ordering appropriate medications  _x_ ordering appropriate tests  _X_ ordering appropriate procedures (including follow-up)  _X_ answering any questions the patient had    Total Time spent on date of visit: 30 minutes

## 2024-12-31 ENCOUNTER — OFFICE VISIT (OUTPATIENT)
Dept: SURGERY | Facility: CLINIC | Age: 54
End: 2024-12-31
Payer: OTHER GOVERNMENT

## 2024-12-31 VITALS
RESPIRATION RATE: 20 BRPM | HEART RATE: 94 BPM | DIASTOLIC BLOOD PRESSURE: 84 MMHG | TEMPERATURE: 98 F | BODY MASS INDEX: 29.81 KG/M2 | OXYGEN SATURATION: 99 % | HEIGHT: 64 IN | SYSTOLIC BLOOD PRESSURE: 131 MMHG | WEIGHT: 174.63 LBS

## 2024-12-31 DIAGNOSIS — Z17.0 MALIGNANT NEOPLASM OF UPPER-OUTER QUADRANT OF RIGHT BREAST IN FEMALE, ESTROGEN RECEPTOR POSITIVE: ICD-10-CM

## 2024-12-31 DIAGNOSIS — N64.1 FAT NECROSIS (SEGMENTAL) OF BREAST: ICD-10-CM

## 2024-12-31 DIAGNOSIS — Z98.890 HISTORY OF BREAST RECONSTRUCTION: ICD-10-CM

## 2024-12-31 DIAGNOSIS — Z17.0 MALIGNANT NEOPLASM OF UPPER-OUTER QUADRANT OF RIGHT BREAST IN FEMALE, ESTROGEN RECEPTOR POSITIVE: Primary | ICD-10-CM

## 2024-12-31 DIAGNOSIS — Z90.13 HISTORY OF BILATERAL MASTECTOMY: ICD-10-CM

## 2024-12-31 DIAGNOSIS — C50.411 MALIGNANT NEOPLASM OF UPPER-OUTER QUADRANT OF RIGHT BREAST IN FEMALE, ESTROGEN RECEPTOR POSITIVE: ICD-10-CM

## 2024-12-31 DIAGNOSIS — C50.411 MALIGNANT NEOPLASM OF UPPER-OUTER QUADRANT OF RIGHT BREAST IN FEMALE, ESTROGEN RECEPTOR POSITIVE: Primary | ICD-10-CM

## 2024-12-31 PROCEDURE — 99215 OFFICE O/P EST HI 40 MIN: CPT | Mod: PBBFAC | Performed by: PHYSICIAN ASSISTANT

## 2024-12-31 PROCEDURE — 99214 OFFICE O/P EST MOD 30 MIN: CPT | Mod: S$PBB,,, | Performed by: PHYSICIAN ASSISTANT

## 2024-12-31 PROCEDURE — 99999 PR PBB SHADOW E&M-EST. PATIENT-LVL V: CPT | Mod: PBBFAC,,, | Performed by: PHYSICIAN ASSISTANT

## 2024-12-31 RX ORDER — SERTRALINE HYDROCHLORIDE 100 MG/1
2 TABLET, FILM COATED ORAL DAILY
COMMUNITY
Start: 2024-07-11

## 2024-12-31 RX ORDER — DIAZEPAM 5 MG/1
5 TABLET ORAL
COMMUNITY
Start: 2024-12-01

## 2024-12-31 RX ORDER — TRAZODONE HYDROCHLORIDE 150 MG/1
1 TABLET ORAL NIGHTLY
COMMUNITY
Start: 2024-11-20

## 2024-12-31 RX ORDER — CETIRIZINE HYDROCHLORIDE 10 MG/1
TABLET ORAL
COMMUNITY

## 2024-12-31 RX ORDER — CEPHALEXIN 500 MG/1
CAPSULE ORAL
COMMUNITY
Start: 2024-12-01

## 2024-12-31 RX ORDER — SENNOSIDES 8.6 MG/1
2 TABLET ORAL DAILY
COMMUNITY
Start: 2024-07-22

## 2024-12-31 RX ORDER — CHLOPHEDIANOL HCL AND PYRILAMINE MALEATE 12.5; 12.5 MG/5ML; MG/5ML
SOLUTION ORAL
COMMUNITY
Start: 2024-12-09

## 2024-12-31 RX ORDER — TRAMADOL HYDROCHLORIDE 50 MG/1
TABLET ORAL
COMMUNITY

## 2024-12-31 RX ORDER — SUMATRIPTAN SUCCINATE 100 MG/1
100 TABLET ORAL
COMMUNITY
Start: 2024-07-22

## 2024-12-31 RX ORDER — ROSUVASTATIN CALCIUM 20 MG/1
10 TABLET, COATED ORAL
COMMUNITY
Start: 2024-07-22

## 2024-12-31 RX ORDER — HYDROCODONE BITARTRATE AND ACETAMINOPHEN 7.5; 325 MG/1; MG/1
TABLET ORAL
COMMUNITY
Start: 2024-12-01

## 2024-12-31 RX ORDER — CHLORHEXIDINE GLUCONATE ORAL RINSE 1.2 MG/ML
SOLUTION DENTAL
COMMUNITY
Start: 2024-12-01

## 2025-01-02 PROBLEM — K02.62 DENTAL CARIES ON SMOOTH SURFACE PENETRATING INTO DENTIN: Status: ACTIVE | Noted: 2025-01-02

## 2025-01-02 PROBLEM — M54.16 LUMBAR RADICULOPATHY: Status: ACTIVE | Noted: 2025-01-02

## 2025-01-02 PROBLEM — Z87.891 FORMER SMOKER: Status: ACTIVE | Noted: 2024-11-12

## 2025-01-02 RX ORDER — LETROZOLE 2.5 MG/1
TABLET, FILM COATED ORAL
Qty: 90 TABLET | Refills: 0 | Status: SHIPPED | OUTPATIENT
Start: 2025-01-02

## 2025-01-02 NOTE — PROGRESS NOTES
Subjective:       Patient ID: Brenden Mccauley is a 54 y.o. female.    Surgeon: Dr. Christina Romero    1. Right Breast Cancer Stage IA (T2(m2) N0(I+)M0)--Diagnosed 24    2. Iron Deficiency Anemia h/o Gastric sleeve in     Biopsy/pathology: Right and left breast biopsies done 24--10:00 4CMFN mass with invasive mammary carcinoma with mixed ductal and lobular features, Grade 2, at least 9mm, associated calcifications, ER 86%, MA 80%, Her2 1+ negative/low, Ki67 23%, 3:00 middle depth calcifications with stromal fibrosis, sclerosing adenosis; Left breast UIQ middle depth calcifications with stromal fibrosis, apocrine metaplasia, sclerosing adenosis, fibroadenomatoid change.  Surgery/pathology:  Bilateral mastectomies done 3/15/24--Right breast with multifocal invasive carcinoma with mixed ductal and lobular features 26mm and 3mm, +PNI, 2/11 lymph nodes with isolated tumor cells; left breast mastectomy with benign breast tissue, fibrocystic changes, site of prior biopsy. Oncotype DX testing with score 14, risk of recurrence with AI alone 4%, no chemotherapy benefit.   Imagin. Screening MMG done 23--on right irregular mass with associated calcifications at 9 o'clock posterior depth.  There are calcifications in the right breast lower inner quadrant, middle depth. There is an asymmetry in the medial right breast, anterior depth; on left calcifications noted at 3 o'clock, posterior depth, in the medial left breast, anterior to middle depth.  There is an asymmetry with possible associated architectural distortion in the medial left breast, middle depth. BIRADS-0; additional imaging needed.     Bilateral diagnostic MMG/US done 1/3/24--right MMG persistent 1.4cm irregular mass with spiculated margins and associated architectural distortion at 10:00, diffuse calcifications throughout right breast, on US 1.4cm irregular hypoechoic mass, right axilla with no adenopathy; left MMG with diffuse  calcifications left breast most concentrated in UI and UO quadrants, assymetry in left breast effaced out, left US with two morphologically similar parallel oval avascular hypoechoic masses with circumscribed margins  measuring 0.9 cm x 0.2 cm x 0.5 cm; at 4 o'clock 5 cm FN and 1.0 cm x 0.3 cm x 0.8 cm; at 4 o'clock 3 cm from the nipple, masses are without significant change mammographically dating back at least to the 2021 exam, and are also likely without significant change dating back to the 2017 exam allowing for differences in technique and interval reduction mammoplast. No suspicious sonographic abnormality is evident in the left breast.. Left axilla demonstrates no significant left axillary adenopathy. BIRADS-4 suspicious; biopsy recommended.  Breast MRI 2/8/24-- Right breast upper outer quadrant, lower outer quadrant, and central region posterior to middle depths irregular heterogeneously enhancing spiculated mass with regional clumped non mass enhancement extending away superiorly, inferiorly, and anteriorly measures 5.7 x 2.6 x 7.2 cm ([id number]) in total extent and correlates with biopsy-proven malignancy.  BI-RADS 6: Known biopsy-proven malignancy.  No abnormal enhancement associated with the recent benign stereotactic guided core needle biopsy sites for calcifications in both breasts (right breast 3:00 axis middle depth and left breast upper inner quadrant middle depth).  There are multiple hematomas in the right breast upper inner and lower inner quadrants in the anterior depth related to the 3:00 axis middle depth core needle biopsy.  BI-RADS 2: Benign. No MRI evidence of malignancy in the left breast.    [employer] BRCA1 and BRCA2 panel, and 84 individual genes with +heterozygous for BLM mutation, pathogenic variant (predisposition to colorectal cancer), POLE heterozygous mutation VUS.     DEXA:  06/17/24--AP spine 0.6, left femoral neck -0.6, left total -0.2, right femoral neck -0.4, right total -0.1.  Normal bone density.     Treatment history:  Bilateral mastectomies with right SLN biopsies with tissue expander reconstruction done 3/15/24.   InFeD 1500mg x 1 dose on 24.     Current treatment plan:   Adjuvant Femara started on 24.     Chief Complaint: 3 mo f/u    HPI  Patient presents for follow-up of breast cancer. She is doing well. Currently on Femara since 2024. Tolerating well with the exception of hot flashes. She underwent bilateral breast reconstruction with Dr. David in July which went well. Since then, she has developed large fat necrosis to her left breast and will have surgery next week to remove. She may need additional surgery after and is considering implants. She plans on going to [place] in March! No new problems reported.     Past Medical History:   Diagnosis Date    Abnormal uterine bleeding     Anemia     Anxiety     Cancer     right breast    Depression     Diabetes mellitus     Endometriosis of uterus     Fibroid     Heart attack     Hyperlipidemia     Hypertension 10/04/2022    Menopause 2017    Obesity, unspecified     BMI 32.10    Pregnancy     Miscarriage    Sleep apnea     no cpap      Past Surgical History:   Procedure Laterality Date    ABDOMINAL SURGERY      APPENDECTOMY      BREAST SURGERY Bilateral     breast reduction    CERVICAL BIOPSY  W/ LOOP ELECTRODE EXCISION      CERVIX LESION DESTRUCTION       SECTION      COSMETIC SURGERY      Breast reduction    DILATION AND CURETTAGE OF UTERUS      ENDOMETRIAL ABLATION      gastric sleeve      HYSTERECTOMY      HYSTEROSCOPY      INSERTION OF BREAST TISSUE EXPANDER Bilateral 3/15/2024    Procedure: INSERTION, TISSUE EXPANDER, BREAST (bilateral breast recon with placement of tissue expanders and acellular dermal matrix, use of intraoperative fluoroscein angiography);  Surgeon: Ubaldo David MD;  Location: [facility];  Service: Plastics;  Laterality: Bilateral;  300mL filled for each  tissue expander    JOINT REPLACEMENT  2020/2022    bilateral knees    LUMBAR LAMINECTOMY N/A 05/29/2023    Procedure: LAMINECTOMY, SPINE, LUMBAR L4 SNS:SSEP/EMG;  Surgeon: Kamlesh Gomez MD;  Location: [facility];  Service: Orthopedics;  Laterality: N/A;    LUMBAR LAMINECTOMY N/A 08/22/2023    Procedure: LAMINECTOMY, SPINE, LUMBAR L4;  Surgeon: Kamlesh Gomez MD;  Location: [address];  Service: Neurosurgery;  Laterality: N/A;    RECONSTRUCTION OF BREAST WITH DEEP INFERIOR EPIGASTRIC ARTERY  (KENNETH) FLAP Bilateral 7/23/2024    Procedure: RECONSTRUCTION, BREAST, USING KENNETH SKIN FLAP (Bilateral breast recon with KENNETH flaps, possible TRAM flaps, possible ventral hernia with mesh, possible rectus muscle plication, use of intraoperative fluoroscein angiography);  Surgeon: Ubaldo David MD;  Location: [facility];  Service: Plastics;  Laterality: Bilateral;    SENTINEL LYMPH NODE BIOPSY Right 3/15/2024    Procedure: BIOPSY, LYMPH NODE, SENTINEL - RIGHT MagTrace Injected in office SentiMag needed No NUC MED;  Surgeon: Christina Romero MD;  [facility];  Service: General;  Laterality: Right;  MagTrace Injected in office  SentiMag needed  No NUC MED    SIMPLE MASTECTOMY Bilateral 3/15/2024    Procedure: MASTECTOMY, SIMPLE - BILATERAL;  Surgeon: Christina Romero MD;  Location: [facility];  Service: General;  Laterality: Bilateral;    SPINE SURGERY  2023    TISSUE EXPANDER REMOVAL Bilateral 7/23/2024    Procedure: REMOVAL, TISSUE EXPANDER;  Surgeon: Ubaldo David MD;  Location: [facility];  Service: Plastics;  Laterality: Bilateral;    TONSILLECTOMY      TOTAL KNEE ARTHROPLASTY Bilateral     TUBAL LIGATION  1992    WISDOM TOOTH EXTRACTION       Family History   Problem Relation Name Age of Onset    Diabetes type I Mother Paul Hamm     Multiple myeloma Mother Paul Hamm 55    Diabetes Mother Paul Hamm     Hypertension Mother Paul Hamm     Kidney disease Mother Paul Hamm      Alzheimer's disease Father              No Known Problems Sister      No Known Problems Brother      Pancreatic cancer Maternal Grandmother  70    Breast cancer Paternal Grandmother Claudette Hamm 55    Liver cancer Paternal Grandmother Claudette Hamm 55    Breast cancer Paternal Aunt Lesley Fulton 62     Social History     Socioeconomic History    Marital status: Single   Tobacco Use    Smoking status: Former     Average packs/day: 0.5 packs/day for 5.0 years (2.5 ttl pk-yrs)     Types: Cigarettes     Start date: 2018     Quit date: 2018     Years since quittin.0    Smokeless tobacco: Never    Tobacco comments:     1 pk every 3 days    Substance and Sexual Activity    Alcohol use: Not Currently     Alcohol/week: 1.0 standard drink of alcohol     Types: 1 Glasses of wine per week    Drug use: Never    Sexual activity: Not Currently     Partners: Male     Birth control/protection: Abstinence     Social Drivers of Health     Financial Resource Strain: Low Risk  (2024)    Overall Financial Resource Strain (CARDIA)     Difficulty of Paying Living Expenses: Not hard at all   Food Insecurity: No Food Insecurity (2024)    Hunger Vital Sign     Worried About Running Out of Food in the Last Year: Never true     Ran Out of Food in the Last Year: Never true   Transportation Needs: No Transportation Needs (2024)    Received from [facility] Subsidiaries and Affiliates    PRAPARE - Transportation     Lack of Transportation (Medical): No     Lack of Transportation (Non-Medical): No   Physical Activity: Insufficiently Active (2024)    Exercise Vital Sign     Days of Exercise per Week: 3 days     Minutes of Exercise per Session: 20 min   Stress: No Stress Concern Present (2024)    [employer] [place] [employer] - Occupational Stress Questionnaire     Feeling of Stress : Only a little   Housing Stability: Unknown (2024)     Housing Stability Vital Sign     Unable to Pay for Housing in the Last Year: No       Review of patient's allergies indicates:   Allergen Reactions    Latex, natural rubber Hives, Anxiety, Itching, Palpitations and Swelling     Other Reaction(s): Unknown    Tomato (solanum lycopersicum) Hives and Swelling      Current Outpatient Medications on File Prior to Visit   Medication Sig Dispense Refill    aspirin (ECOTRIN) 81 MG EC tablet Take 81 mg by mouth once daily.      azelastine (ASTELIN) 137 mcg (0.1 %) nasal spray SMARTSIG:Both Nares      blood sugar diagnostic (ONETOUCH ULTRA TEST) Strp CHECK SUGARS DAILY      blood sugar diagnostic (TRUE METRIX GLUCOSE TEST STRIP) Strp CHECK SUGARS DAILY      busPIRone (BUSPAR) 30 MG Tab Take 30 mg by mouth 2 (two) times a day.      carvediloL (COREG) 3.125 MG tablet Take 3.125 mg by mouth 2 (two) times daily.      cephALEXin (KEFLEX) 500 MG capsule Take by mouth.      cetirizine (ZYRTEC) 10 MG tablet 1 tablet Orally Once a day      chlorhexidine (PERIDEX) 0.12 % solution GENTLY DAB ON SURGICAL SITE TWICE A DAY FOR 7-10 DAYS      diazePAM (VALIUM) 5 MG tablet Take 5 mg by mouth.      FEROSUL 325 mg (65 mg iron) Tab tablet Take by mouth every other day.      fexofenadine (ALLEGRA) 180 MG tablet Take 1 tablet by mouth every morning.      fluoride, sodium, (PREVIDENT 5000 PLUS) 1.1 % Crea       HYDROcodone-acetaminophen (NORCO) 7.5-325 mg per tablet Take by mouth.      ipratropium (ATROVENT) 42 mcg (0.06 %) nasal spray by Each Nostril route.      letrozole (FEMARA) 2.5 mg Tab TAKE 1 TABLET(2.5 MG) BY MOUTH DAILY 90 tablet 0    metFORMIN (GLUCOPHAGE) 500 MG tablet Take 500 mg by mouth 2 (two) times daily with meals.      montelukast (SINGULAIR) 10 mg tablet Take 10 mg by mouth once daily.      NINJACOF 12.5-12.5 mg/5 mL Liqd PRN      pantoprazole (PROTONIX) 40 MG tablet TAKE ONE TABLET BY MOUTH EVERY DAY FOR ACID REFLUX      rosuvastatin (CRESTOR) 20 MG tablet Take 10 mg by mouth.    "   senna (SENOKOT) 8.6 mg tablet Take 2 tablets by mouth once daily.      sertraline (ZOLOFT) 100 MG tablet Take 2 tablets by mouth once daily.      SODIUM FLUORIDE 5000 DRY MOUTH 1.1 % Pste       sumatriptan (IMITREX) 100 MG tablet Take 100 mg by mouth.      traMADoL (ULTRAM) 50 mg tablet 1 tablet as needed Orally Twice a day      traZODone (DESYREL) 150 MG tablet Take 1 tablet by mouth every evening.      vitamin D (VITAMIN D3) 1000 units Tab Take 1,000 Units by mouth once daily.      blood-glucose meter kit Check sugars daily. For T2DM without insulin (E11.9)      lamoTRIgine (LAMICTAL) 150 MG Tab TAKE ONE TABLET BY MOUTH AT BEDTIME SEIZURES FOR MOOD STABILIZATION       No current facility-administered medications on file prior to visit.      Review of Systems   Constitutional:  Negative for appetite change and unexpected weight change.   HENT:  Negative for mouth sores.    Eyes:  Negative for visual disturbance.   Respiratory:  Negative for cough and shortness of breath.    Cardiovascular:  Negative for chest pain.   Gastrointestinal:  Negative for abdominal pain and diarrhea.   Genitourinary:  Negative for frequency.   Musculoskeletal:  Negative for back pain.   Integumentary:  Negative for rash.   Neurological:  Negative for headaches.   Hematological:  Negative for adenopathy.   Psychiatric/Behavioral:  The patient is not nervous/anxious.          Vitals:    01/09/25 1413   BP: 131/84   Pulse: 66   Resp: 18   Temp: 97.8 °F (36.6 °C)   TempSrc: Oral   SpO2: 100%   Weight: 79.1 kg (174 lb 4.8 oz)   Height: 5' 4" (1.626 m)     Physical Exam  Constitutional:       Appearance: Normal appearance. She is overweight.   HENT:      Head: Normocephalic.      Nose: Nose normal.      Mouth/Throat:      Mouth: Mucous membranes are moist.   Eyes:      Extraocular Movements: Extraocular movements intact.      Conjunctiva/sclera: Conjunctivae normal.   Cardiovascular:      Rate and Rhythm: Normal rate and regular rhythm. "   Pulmonary:      Effort: Pulmonary effort is normal.      Breath sounds: Normal breath sounds.   Chest:      Comments: S/p bilateral mastectomies with breast reconstruction. Right breast with small areas of fat necrosis laterally, along incision line. Left breast with large area of fat necrosis taking up a majority of the breast. No axillary adenopathy.   Abdominal:      General: Bowel sounds are normal. There is no distension.      Palpations: Abdomen is soft.      Tenderness: There is no abdominal tenderness.   Musculoskeletal:         General: Normal range of motion.   Skin:     General: Skin is warm.   Neurological:      General: No focal deficit present.      Mental Status: She is alert and oriented to person, place, and time.   Psychiatric:         Mood and Affect: Mood normal.         Behavior: Behavior is cooperative.         Judgment: Judgment normal.       Lab Visit on 01/03/2025   Component Date Value Ref Range Status    Sodium 01/03/2025 139  136 - 145 mmol/L Final    Potassium 01/03/2025 3.8  3.5 - 5.1 mmol/L Final    Chloride 01/03/2025 104  98 - 107 mmol/L Final    CO2 01/03/2025 26  22 - 29 mmol/L Final    Glucose 01/03/2025 102 (H)  74 - 100 mg/dL Final    Blood Urea Nitrogen 01/03/2025 8.2 (L)  9.8 - 20.1 mg/dL Final    Creatinine 01/03/2025 0.69  0.55 - 1.02 mg/dL Final    Calcium 01/03/2025 9.3  8.4 - 10.2 mg/dL Final    Protein Total 01/03/2025 7.3  6.4 - 8.3 gm/dL Final    Albumin 01/03/2025 3.5  3.5 - 5.0 g/dL Final    Globulin 01/03/2025 3.8 (H)  2.4 - 3.5 gm/dL Final    Albumin/Globulin Ratio 01/03/2025 0.9 (L)  1.1 - 2.0 ratio Final    Bilirubin Total 01/03/2025 0.4  <=1.5 mg/dL Final    ALP 01/03/2025 118  40 - 150 unit/L Final    ALT 01/03/2025 12  0 - 55 unit/L Final    AST 01/03/2025 19  5 - 34 unit/L Final    eGFR 01/03/2025 >60  mL/min/1.73/m2 Final    Anion Gap 01/03/2025 9.0  mEq/L Final    BUN/Creatinine Ratio 01/03/2025 12   Final    Ferritin Level 01/03/2025 113.66  4.63 -  204.00 ng/mL Final    Iron Binding Capacity Unsaturated 01/03/2025 129  70 - 310 ug/dL Final    Iron Level 01/03/2025 136  50 - 170 ug/dL Final    Transferrin 01/03/2025 217  180 - 382 mg/dL Final    Iron Binding Capacity Total 01/03/2025 265  250 - 450 ug/dL Final    Iron Saturation 01/03/2025 51 (H)  20 - 50 % Final    WBC 01/03/2025 6.86  4.50 - 11.50 x10(3)/mcL Final    RBC 01/03/2025 4.47  4.20 - 5.40 x10(6)/mcL Final    Hgb 01/03/2025 13.1  12.0 - 16.0 g/dL Final    Hct 01/03/2025 39.5  37.0 - 47.0 % Final    MCV 01/03/2025 88.4  80.0 - 94.0 fL Final    MCH 01/03/2025 29.3  27.0 - 31.0 pg Final    MCHC 01/03/2025 33.2  33.0 - 36.0 g/dL Final    RDW 01/03/2025 14.6  11.5 - 17.0 % Final    Platelet 01/03/2025 300  130 - 400 x10(3)/mcL Final    MPV 01/03/2025 10.2  7.4 - 10.4 fL Final    Neut % 01/03/2025 50.1  % Final    Lymph % 01/03/2025 42.4  % Final    Mono % 01/03/2025 6.1  % Final    Eos % 01/03/2025 1.0  % Final    Basophil % 01/03/2025 0.3  % Final    Lymph # 01/03/2025 2.91  0.6 - 4.6 x10(3)/mcL Final    Neut # 01/03/2025 3.43  2.1 - 9.2 x10(3)/mcL Final    Mono # 01/03/2025 0.42  0.1 - 1.3 x10(3)/mcL Final    Eos # 01/03/2025 0.07  0 - 0.9 x10(3)/mcL Final    Baso # 01/03/2025 0.02  <=0.2 x10(3)/mcL Final    Imm Gran # 01/03/2025 0.01  0 - 0.04 x10(3)/mcL Final    Imm Grans % 01/03/2025 0.1  % Final         Assessment:       1. Iron deficiency anemia secondary to inadequate dietary iron intake    2. Malignant neoplasm of upper-outer quadrant of right breast in female, estrogen receptor positive    3. Morbid obesity        Plan:       Patient with right breast cancer s/p bilateral mastectomies done 3/15/24. Multifocal breast cancer, mixed ductal and lobular, 26mm and 3mm, with +PNI, and 2/11 lymph nodes with ITC, ER and ID strongly positive and Her2 negative with Ki67 23%.  Per [facility], recommended to have Oncotype, and score returned low risk 14.   No radiation recommended due to only isolated tumor cells  in LN.  Patient is post-menopausal, JENNIFER age 40 for fibroids and endometriosis, and was on +HRT when she was diagnosed as post-menopausal since age 44. HRT stopped at diagnosis.     Started Femara on 4/26/24. Tolerating well.   CBC today is good, anemia resolved. CMP unremarkable. Iron studies remain normal.   Baseline DEXA on 6/17/24 with normal bone density.   Continue Calcium and Vitamin D3.   S/p bilateral breast reconstruction in July with Dr. David. Plan for revision of the left breast next week to remove large area of fat necrosis.   Continue with surveillance visits every 3 months.     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 There are no clear guidelines for screening but suggested to start screening colonoscopy age 40 and continue every 5 years. She has not had one in several years. [facility] She would like me to make a referral today. Will send to Dr. Del Toro.     She has known [facility]. She has a h/o gastric sleeve surgery in 2020, so suspect this is the culprit.  Given InFeD 1500mg x 1 dose on 5/1/24.   Anemia remains resolved.     All questions answered at this time.      Segundo Eaton, [facility]

## 2025-01-03 ENCOUNTER — LAB VISIT (OUTPATIENT)
Dept: LAB | Facility: HOSPITAL | Age: 55
End: 2025-01-03
Attending: INTERNAL MEDICINE
Payer: OTHER GOVERNMENT

## 2025-01-03 DIAGNOSIS — Z17.0 MALIGNANT NEOPLASM OF UPPER-OUTER QUADRANT OF RIGHT BREAST IN FEMALE, ESTROGEN RECEPTOR POSITIVE: ICD-10-CM

## 2025-01-03 DIAGNOSIS — D50.0 IRON DEFICIENCY ANEMIA DUE TO CHRONIC BLOOD LOSS: ICD-10-CM

## 2025-01-03 DIAGNOSIS — C50.411 MALIGNANT NEOPLASM OF UPPER-OUTER QUADRANT OF RIGHT BREAST IN FEMALE, ESTROGEN RECEPTOR POSITIVE: ICD-10-CM

## 2025-01-03 LAB
ALBUMIN SERPL-MCNC: 3.5 G/DL (ref 3.5–5)
ALBUMIN/GLOB SERPL: 0.9 RATIO (ref 1.1–2)
ALP SERPL-CCNC: 118 UNIT/L (ref 40–150)
ALT SERPL-CCNC: 12 UNIT/L (ref 0–55)
ANION GAP SERPL CALC-SCNC: 9 MEQ/L
AST SERPL-CCNC: 19 UNIT/L (ref 5–34)
BASOPHILS # BLD AUTO: 0.02 X10(3)/MCL
BASOPHILS NFR BLD AUTO: 0.3 %
BILIRUB SERPL-MCNC: 0.4 MG/DL
BUN SERPL-MCNC: 8.2 MG/DL (ref 9.8–20.1)
CALCIUM SERPL-MCNC: 9.3 MG/DL (ref 8.4–10.2)
CHLORIDE SERPL-SCNC: 104 MMOL/L (ref 98–107)
CO2 SERPL-SCNC: 26 MMOL/L (ref 22–29)
CREAT SERPL-MCNC: 0.69 MG/DL (ref 0.55–1.02)
CREAT/UREA NIT SERPL: 12
EOSINOPHIL # BLD AUTO: 0.07 X10(3)/MCL (ref 0–0.9)
EOSINOPHIL NFR BLD AUTO: 1 %
ERYTHROCYTE [DISTWIDTH] IN BLOOD BY AUTOMATED COUNT: 14.6 % (ref 11.5–17)
FERRITIN SERPL-MCNC: 113.66 NG/ML (ref 4.63–204)
GFR SERPLBLD CREATININE-BSD FMLA CKD-EPI: >60 ML/MIN/1.73/M2
GLOBULIN SER-MCNC: 3.8 GM/DL (ref 2.4–3.5)
GLUCOSE SERPL-MCNC: 102 MG/DL (ref 74–100)
HCT VFR BLD AUTO: 39.5 % (ref 37–47)
HGB BLD-MCNC: 13.1 G/DL (ref 12–16)
IMM GRANULOCYTES # BLD AUTO: 0.01 X10(3)/MCL (ref 0–0.04)
IMM GRANULOCYTES NFR BLD AUTO: 0.1 %
IRON SATN MFR SERPL: 51 % (ref 20–50)
IRON SERPL-MCNC: 136 UG/DL (ref 50–170)
LYMPHOCYTES # BLD AUTO: 2.91 X10(3)/MCL (ref 0.6–4.6)
LYMPHOCYTES NFR BLD AUTO: 42.4 %
MCH RBC QN AUTO: 29.3 PG (ref 27–31)
MCHC RBC AUTO-ENTMCNC: 33.2 G/DL (ref 33–36)
MCV RBC AUTO: 88.4 FL (ref 80–94)
MONOCYTES # BLD AUTO: 0.42 X10(3)/MCL (ref 0.1–1.3)
MONOCYTES NFR BLD AUTO: 6.1 %
NEUTROPHILS # BLD AUTO: 3.43 X10(3)/MCL (ref 2.1–9.2)
NEUTROPHILS NFR BLD AUTO: 50.1 %
PLATELET # BLD AUTO: 300 X10(3)/MCL (ref 130–400)
PMV BLD AUTO: 10.2 FL (ref 7.4–10.4)
POTASSIUM SERPL-SCNC: 3.8 MMOL/L (ref 3.5–5.1)
PROT SERPL-MCNC: 7.3 GM/DL (ref 6.4–8.3)
RBC # BLD AUTO: 4.47 X10(6)/MCL (ref 4.2–5.4)
SODIUM SERPL-SCNC: 139 MMOL/L (ref 136–145)
TIBC SERPL-MCNC: 129 UG/DL (ref 70–310)
TIBC SERPL-MCNC: 265 UG/DL (ref 250–450)
TRANSFERRIN SERPL-MCNC: 217 MG/DL (ref 180–382)
WBC # BLD AUTO: 6.86 X10(3)/MCL (ref 4.5–11.5)

## 2025-01-03 PROCEDURE — 82728 ASSAY OF FERRITIN: CPT

## 2025-01-03 PROCEDURE — 80053 COMPREHEN METABOLIC PANEL: CPT

## 2025-01-03 PROCEDURE — 36415 COLL VENOUS BLD VENIPUNCTURE: CPT

## 2025-01-03 PROCEDURE — 83540 ASSAY OF IRON: CPT

## 2025-01-03 PROCEDURE — 85025 COMPLETE CBC W/AUTO DIFF WBC: CPT

## 2025-01-09 ENCOUNTER — OFFICE VISIT (OUTPATIENT)
Dept: HEMATOLOGY/ONCOLOGY | Facility: CLINIC | Age: 55
End: 2025-01-09
Payer: OTHER GOVERNMENT

## 2025-01-09 VITALS
OXYGEN SATURATION: 100 % | SYSTOLIC BLOOD PRESSURE: 131 MMHG | RESPIRATION RATE: 18 BRPM | HEIGHT: 64 IN | BODY MASS INDEX: 29.76 KG/M2 | WEIGHT: 174.31 LBS | DIASTOLIC BLOOD PRESSURE: 84 MMHG | HEART RATE: 66 BPM | TEMPERATURE: 98 F

## 2025-01-09 DIAGNOSIS — Z15.09: ICD-10-CM

## 2025-01-09 DIAGNOSIS — D50.8 IRON DEFICIENCY ANEMIA SECONDARY TO INADEQUATE DIETARY IRON INTAKE: Primary | ICD-10-CM

## 2025-01-09 DIAGNOSIS — E66.01 MORBID OBESITY: ICD-10-CM

## 2025-01-09 DIAGNOSIS — C50.411 MALIGNANT NEOPLASM OF UPPER-OUTER QUADRANT OF RIGHT BREAST IN FEMALE, ESTROGEN RECEPTOR POSITIVE: ICD-10-CM

## 2025-01-09 DIAGNOSIS — Z17.0 MALIGNANT NEOPLASM OF UPPER-OUTER QUADRANT OF RIGHT BREAST IN FEMALE, ESTROGEN RECEPTOR POSITIVE: ICD-10-CM

## 2025-01-09 DIAGNOSIS — Z15.89: ICD-10-CM

## 2025-01-09 PROCEDURE — 99999 PR PBB SHADOW E&M-EST. PATIENT-LVL V: CPT | Mod: PBBFAC,,, | Performed by: NURSE PRACTITIONER

## 2025-01-09 PROCEDURE — 99214 OFFICE O/P EST MOD 30 MIN: CPT | Mod: S$PBB,,, | Performed by: NURSE PRACTITIONER

## 2025-01-09 PROCEDURE — 99215 OFFICE O/P EST HI 40 MIN: CPT | Mod: PBBFAC | Performed by: NURSE PRACTITIONER

## 2025-01-10 ENCOUNTER — PATIENT MESSAGE (OUTPATIENT)
Dept: HEMATOLOGY/ONCOLOGY | Facility: CLINIC | Age: 55
End: 2025-01-10
Payer: OTHER GOVERNMENT

## 2025-01-15 ENCOUNTER — ANESTHESIA EVENT (OUTPATIENT)
Dept: SURGERY | Facility: HOSPITAL | Age: 55
End: 2025-01-15
Payer: OTHER GOVERNMENT

## 2025-01-16 ENCOUNTER — HOSPITAL ENCOUNTER (OUTPATIENT)
Facility: HOSPITAL | Age: 55
Discharge: HOME | End: 2025-01-16
Attending: SURGERY | Admitting: SURGERY
Payer: OTHER GOVERNMENT

## 2025-01-16 ENCOUNTER — ANESTHESIA (OUTPATIENT)
Dept: SURGERY | Facility: HOSPITAL | Age: 55
End: 2025-01-16
Payer: OTHER GOVERNMENT

## 2025-01-16 DIAGNOSIS — Z42.1 ENCOUNTER FOR BREAST RECONSTRUCTION FOLLOWING MASTECTOMY: ICD-10-CM

## 2025-01-16 DIAGNOSIS — Z85.3 PERSONAL HISTORY OF MALIGNANT NEOPLASM OF BREAST: ICD-10-CM

## 2025-01-16 DIAGNOSIS — N65.1 DISPROPORTION OF RECONSTRUCTED BREAST: ICD-10-CM

## 2025-01-16 LAB
POCT GLUCOSE: 120 MG/DL (ref 70–110)
POCT GLUCOSE: 97 MG/DL (ref 70–110)

## 2025-01-16 PROCEDURE — D9220A PRA ANESTHESIA: Mod: CRNA,,, | Performed by: NURSE ANESTHETIST, CERTIFIED REGISTERED

## 2025-01-16 PROCEDURE — 36000707: Performed by: SURGERY

## 2025-01-16 PROCEDURE — 37000009 HC ANESTHESIA EA ADD 15 MINS: Performed by: SURGERY

## 2025-01-16 PROCEDURE — 37000008 HC ANESTHESIA 1ST 15 MINUTES: Performed by: SURGERY

## 2025-01-16 PROCEDURE — 63600175 PHARM REV CODE 636 W HCPCS: Performed by: NURSE ANESTHETIST, CERTIFIED REGISTERED

## 2025-01-16 PROCEDURE — 36000706: Performed by: SURGERY

## 2025-01-16 PROCEDURE — 71000033 HC RECOVERY, INTIAL HOUR: Performed by: SURGERY

## 2025-01-16 PROCEDURE — 82962 GLUCOSE BLOOD TEST: CPT | Performed by: SURGERY

## 2025-01-16 PROCEDURE — D9220A PRA ANESTHESIA: Mod: ANES,,, | Performed by: ANESTHESIOLOGY

## 2025-01-16 PROCEDURE — 63600175 PHARM REV CODE 636 W HCPCS: Performed by: SURGERY

## 2025-01-16 PROCEDURE — 25000003 PHARM REV CODE 250: Performed by: ANESTHESIOLOGY

## 2025-01-16 PROCEDURE — 19380 REVJ RECONSTRUCTED BREAST: CPT | Mod: 50,,, | Performed by: SURGERY

## 2025-01-16 PROCEDURE — 71000015 HC POSTOP RECOV 1ST HR: Performed by: SURGERY

## 2025-01-16 PROCEDURE — 25000003 PHARM REV CODE 250: Performed by: NURSE ANESTHETIST, CERTIFIED REGISTERED

## 2025-01-16 PROCEDURE — 71000016 HC POSTOP RECOV ADDL HR: Performed by: SURGERY

## 2025-01-16 PROCEDURE — 63600175 PHARM REV CODE 636 W HCPCS: Performed by: ANESTHESIOLOGY

## 2025-01-16 PROCEDURE — C1729 CATH, DRAINAGE: HCPCS | Performed by: SURGERY

## 2025-01-16 RX ORDER — ONDANSETRON HYDROCHLORIDE 2 MG/ML
4 INJECTION, SOLUTION INTRAVENOUS ONCE AS NEEDED
Status: DISCONTINUED | OUTPATIENT
Start: 2025-01-16 | End: 2025-01-16 | Stop reason: HOSPADM

## 2025-01-16 RX ORDER — BUPIVACAINE 13.3 MG/ML
INJECTION, SUSPENSION, LIPOSOMAL INFILTRATION
Status: DISCONTINUED
Start: 2025-01-16 | End: 2025-01-16 | Stop reason: HOSPADM

## 2025-01-16 RX ORDER — METHOCARBAMOL 100 MG/ML
1000 INJECTION, SOLUTION INTRAMUSCULAR; INTRAVENOUS ONCE AS NEEDED
Status: COMPLETED | OUTPATIENT
Start: 2025-01-16 | End: 2025-01-16

## 2025-01-16 RX ORDER — FENTANYL CITRATE 50 UG/ML
INJECTION, SOLUTION INTRAMUSCULAR; INTRAVENOUS
Status: DISCONTINUED | OUTPATIENT
Start: 2025-01-16 | End: 2025-01-16

## 2025-01-16 RX ORDER — HYDROMORPHONE HYDROCHLORIDE 2 MG/ML
0.4 INJECTION, SOLUTION INTRAMUSCULAR; INTRAVENOUS; SUBCUTANEOUS EVERY 5 MIN PRN
Status: DISCONTINUED | OUTPATIENT
Start: 2025-01-16 | End: 2025-01-16 | Stop reason: HOSPADM

## 2025-01-16 RX ORDER — LIDOCAINE HYDROCHLORIDE 10 MG/ML
INJECTION, SOLUTION INFILTRATION; PERINEURAL
Status: DISCONTINUED
Start: 2025-01-16 | End: 2025-01-16 | Stop reason: HOSPADM

## 2025-01-16 RX ORDER — BUPIVACAINE 13.3 MG/ML
INJECTION, SUSPENSION, LIPOSOMAL INFILTRATION
Status: DISCONTINUED | OUTPATIENT
Start: 2025-01-16 | End: 2025-01-16 | Stop reason: HOSPADM

## 2025-01-16 RX ORDER — CEFAZOLIN SODIUM 1 G/3ML
2 INJECTION, POWDER, FOR SOLUTION INTRAMUSCULAR; INTRAVENOUS
Status: DISCONTINUED | OUTPATIENT
Start: 2025-01-16 | End: 2025-01-16 | Stop reason: HOSPADM

## 2025-01-16 RX ORDER — EPINEPHRINE 1 MG/ML
INJECTION, SOLUTION, CONCENTRATE INTRAVENOUS
Status: DISCONTINUED
Start: 2025-01-16 | End: 2025-01-16 | Stop reason: HOSPADM

## 2025-01-16 RX ORDER — GLUCAGON 1 MG
1 KIT INJECTION
Status: DISCONTINUED | OUTPATIENT
Start: 2025-01-16 | End: 2025-01-16 | Stop reason: HOSPADM

## 2025-01-16 RX ORDER — MUPIROCIN 20 MG/G
OINTMENT TOPICAL 2 TIMES DAILY
Status: DISCONTINUED | OUTPATIENT
Start: 2025-01-16 | End: 2025-01-16 | Stop reason: HOSPADM

## 2025-01-16 RX ORDER — MIDAZOLAM HYDROCHLORIDE 2 MG/2ML
2 INJECTION, SOLUTION INTRAMUSCULAR; INTRAVENOUS ONCE AS NEEDED
Status: COMPLETED | OUTPATIENT
Start: 2025-01-16 | End: 2025-01-16

## 2025-01-16 RX ORDER — MORPHINE SULFATE 4 MG/ML
2 INJECTION, SOLUTION INTRAMUSCULAR; INTRAVENOUS EVERY 4 HOURS PRN
Status: DISCONTINUED | OUTPATIENT
Start: 2025-01-16 | End: 2025-01-16 | Stop reason: HOSPADM

## 2025-01-16 RX ORDER — CYCLOBENZAPRINE HCL 10 MG
10 TABLET ORAL 3 TIMES DAILY PRN
Status: DISCONTINUED | OUTPATIENT
Start: 2025-01-16 | End: 2025-01-16 | Stop reason: HOSPADM

## 2025-01-16 RX ORDER — EPINEPHRINE 0.1 MG/ML
INJECTION INTRAVENOUS
Status: DISCONTINUED | OUTPATIENT
Start: 2025-01-16 | End: 2025-01-16 | Stop reason: HOSPADM

## 2025-01-16 RX ORDER — ROCURONIUM BROMIDE 10 MG/ML
INJECTION, SOLUTION INTRAVENOUS
Status: DISCONTINUED | OUTPATIENT
Start: 2025-01-16 | End: 2025-01-16

## 2025-01-16 RX ORDER — LIDOCAINE HYDROCHLORIDE 10 MG/ML
INJECTION, SOLUTION EPIDURAL; INFILTRATION; INTRACAUDAL; PERINEURAL
Status: DISCONTINUED | OUTPATIENT
Start: 2025-01-16 | End: 2025-01-16

## 2025-01-16 RX ORDER — SODIUM CHLORIDE 9 MG/ML
INJECTION, SOLUTION INTRAVENOUS CONTINUOUS
Status: DISCONTINUED | OUTPATIENT
Start: 2025-01-16 | End: 2025-01-16 | Stop reason: HOSPADM

## 2025-01-16 RX ORDER — ONDANSETRON 4 MG/1
4 TABLET, ORALLY DISINTEGRATING ORAL ONCE
Status: COMPLETED | OUTPATIENT
Start: 2025-01-16 | End: 2025-01-16

## 2025-01-16 RX ORDER — CEFAZOLIN SODIUM 1 G/3ML
2 INJECTION, POWDER, FOR SOLUTION INTRAMUSCULAR; INTRAVENOUS
Status: COMPLETED | OUTPATIENT
Start: 2025-01-16 | End: 2025-01-16

## 2025-01-16 RX ORDER — PROPOFOL 10 MG/ML
VIAL (ML) INTRAVENOUS
Status: DISCONTINUED | OUTPATIENT
Start: 2025-01-16 | End: 2025-01-16

## 2025-01-16 RX ORDER — ACETAMINOPHEN 500 MG
1000 TABLET ORAL
Status: COMPLETED | OUTPATIENT
Start: 2025-01-16 | End: 2025-01-16

## 2025-01-16 RX ORDER — SODIUM CHLORIDE, SODIUM GLUCONATE, SODIUM ACETATE, POTASSIUM CHLORIDE AND MAGNESIUM CHLORIDE 30; 37; 368; 526; 502 MG/100ML; MG/100ML; MG/100ML; MG/100ML; MG/100ML
INJECTION, SOLUTION INTRAVENOUS CONTINUOUS
Status: DISCONTINUED | OUTPATIENT
Start: 2025-01-16 | End: 2025-01-16 | Stop reason: HOSPADM

## 2025-01-16 RX ORDER — MORPHINE SULFATE 4 MG/ML
INJECTION, SOLUTION INTRAMUSCULAR; INTRAVENOUS
Status: DISCONTINUED | OUTPATIENT
Start: 2025-01-16 | End: 2025-01-16

## 2025-01-16 RX ORDER — PROCHLORPERAZINE EDISYLATE 5 MG/ML
5 INJECTION INTRAMUSCULAR; INTRAVENOUS EVERY 6 HOURS PRN
Status: DISCONTINUED | OUTPATIENT
Start: 2025-01-16 | End: 2025-01-16 | Stop reason: HOSPADM

## 2025-01-16 RX ORDER — ONDANSETRON HYDROCHLORIDE 2 MG/ML
4 INJECTION, SOLUTION INTRAVENOUS EVERY 12 HOURS PRN
Status: DISCONTINUED | OUTPATIENT
Start: 2025-01-16 | End: 2025-01-16 | Stop reason: HOSPADM

## 2025-01-16 RX ORDER — ONDANSETRON HYDROCHLORIDE 2 MG/ML
INJECTION, SOLUTION INTRAMUSCULAR; INTRAVENOUS
Status: DISCONTINUED | OUTPATIENT
Start: 2025-01-16 | End: 2025-01-16

## 2025-01-16 RX ORDER — DEXMEDETOMIDINE HYDROCHLORIDE 100 UG/ML
INJECTION, SOLUTION INTRAVENOUS
Status: DISCONTINUED | OUTPATIENT
Start: 2025-01-16 | End: 2025-01-16

## 2025-01-16 RX ORDER — OXYCODONE AND ACETAMINOPHEN 5; 325 MG/1; MG/1
2 TABLET ORAL EVERY 4 HOURS PRN
Status: DISCONTINUED | OUTPATIENT
Start: 2025-01-16 | End: 2025-01-16 | Stop reason: HOSPADM

## 2025-01-16 RX ORDER — GABAPENTIN 300 MG/1
300 CAPSULE ORAL
Status: COMPLETED | OUTPATIENT
Start: 2025-01-16 | End: 2025-01-16

## 2025-01-16 RX ORDER — PHENYLEPHRINE HYDROCHLORIDE 10 MG/ML
INJECTION INTRAVENOUS
Status: DISCONTINUED | OUTPATIENT
Start: 2025-01-16 | End: 2025-01-16

## 2025-01-16 RX ORDER — LIDOCAINE HYDROCHLORIDE 10 MG/ML
INJECTION, SOLUTION EPIDURAL; INFILTRATION; INTRACAUDAL; PERINEURAL
Status: DISCONTINUED | OUTPATIENT
Start: 2025-01-16 | End: 2025-01-16 | Stop reason: HOSPADM

## 2025-01-16 RX ADMIN — CEFAZOLIN 2 G: 330 INJECTION, POWDER, FOR SOLUTION INTRAMUSCULAR; INTRAVENOUS at 06:01

## 2025-01-16 RX ADMIN — ONDANSETRON 4 MG: 2 INJECTION INTRAMUSCULAR; INTRAVENOUS at 09:01

## 2025-01-16 RX ADMIN — GABAPENTIN 300 MG: 300 CAPSULE ORAL at 06:01

## 2025-01-16 RX ADMIN — FENTANYL CITRATE 50 MCG: 50 INJECTION, SOLUTION INTRAMUSCULAR; INTRAVENOUS at 06:01

## 2025-01-16 RX ADMIN — METHOCARBAMOL 1000 MG: 100 INJECTION INTRAMUSCULAR; INTRAVENOUS at 09:01

## 2025-01-16 RX ADMIN — PHENYLEPHRINE HYDROCHLORIDE 100 MCG: 10 INJECTION INTRAVENOUS at 09:01

## 2025-01-16 RX ADMIN — PHENYLEPHRINE HYDROCHLORIDE 100 MCG: 10 INJECTION INTRAVENOUS at 08:01

## 2025-01-16 RX ADMIN — DEXMEDETOMIDINE 4 MCG: 200 INJECTION, SOLUTION INTRAVENOUS at 07:01

## 2025-01-16 RX ADMIN — ROCURONIUM BROMIDE 40 MG: 10 INJECTION, SOLUTION INTRAVENOUS at 06:01

## 2025-01-16 RX ADMIN — PROPOFOL 180 MG: 10 INJECTION, EMULSION INTRAVENOUS at 06:01

## 2025-01-16 RX ADMIN — MORPHINE SULFATE 4 MG: 4 INJECTION, SOLUTION INTRAMUSCULAR; INTRAVENOUS at 09:01

## 2025-01-16 RX ADMIN — DEXMEDETOMIDINE 4 MCG: 200 INJECTION, SOLUTION INTRAVENOUS at 08:01

## 2025-01-16 RX ADMIN — MIDAZOLAM HYDROCHLORIDE 2 MG: 1 INJECTION, SOLUTION INTRAMUSCULAR; INTRAVENOUS at 06:01

## 2025-01-16 RX ADMIN — ACETAMINOPHEN 1000 MG: 500 TABLET ORAL at 06:01

## 2025-01-16 RX ADMIN — FENTANYL CITRATE 25 MCG: 50 INJECTION, SOLUTION INTRAMUSCULAR; INTRAVENOUS at 07:01

## 2025-01-16 RX ADMIN — ROCURONIUM BROMIDE 20 MG: 10 INJECTION, SOLUTION INTRAVENOUS at 08:01

## 2025-01-16 RX ADMIN — LIDOCAINE HYDROCHLORIDE 30 MG: 10 INJECTION, SOLUTION EPIDURAL; INFILTRATION; INTRACAUDAL; PERINEURAL at 06:01

## 2025-01-16 RX ADMIN — SUGAMMADEX 200 MG: 100 INJECTION, SOLUTION INTRAVENOUS at 09:01

## 2025-01-16 RX ADMIN — ONDANSETRON 4 MG: 4 TABLET, ORALLY DISINTEGRATING ORAL at 06:01

## 2025-01-16 RX ADMIN — SODIUM CHLORIDE, POTASSIUM CHLORIDE, SODIUM LACTATE AND CALCIUM CHLORIDE: 600; 310; 30; 20 INJECTION, SOLUTION INTRAVENOUS at 06:01

## 2025-01-16 NOTE — ANESTHESIA PROCEDURE NOTES
Intubation    Date/Time: 1/16/2025 6:56 AM    Performed by: Neeru Rutherford CRNA  Authorized by: Pablo Ash MD    Intubation:     Induction:  Intravenous    Intubated:  Postinduction    Mask Ventilation:  Easy with oral airway    Attempts:  1    Attempted By:  CRNA    Method of Intubation:  Direct    Blade:  Marilin 3    Laryngeal View Grade: Grade I - full view of cords      Difficult Airway Encountered?: No      Complications:  None    Airway Device:  Oral endotracheal tube    Airway Device Size:  6.5    Style/Cuff Inflation:  Cuffed (inflated to minimal occlusive pressure)    Tube secured:  21    Secured at:  The lips    Placement Verified By:  Capnometry    Complicating Factors:  None    Findings Post-Intubation:  BS equal bilateral and atraumatic/condition of teeth unchanged

## 2025-01-16 NOTE — CARE UPDATE
Dr. Lyn rounding and updated on her status, v/s, med given. No new orders given. He approved her for transfer to post op room at any time.

## 2025-01-16 NOTE — PLAN OF CARE
Pt. Resting comfortably, VSS, no s/s distress, Zohaib at acceptable level for discharge. Discharge instructions given verbally and written handout printed and given to pt. And friend as well. Both stated understanding. Pt. Discharged from facility per wheelchair with friend driving her home.

## 2025-01-16 NOTE — DISCHARGE INSTRUCTIONS
Breast Reconstruction Discharge Instructions     What care is needed at home?   Sleep with your head and chest raised on 2 to 3 pillows to lower swelling.  Your doctor may suggest you use an ice pack over the painful part. Never put ice right on the skin. Do not leave the ice on more than 10 to 15 minutes at a time.  Cough and take deep breaths to help keep your lungs clear.  Be sure to wash your hands before and after touching your wound or dressing.   Leave your dressing in place. Do not remove the dressing. Keep clean, dry, and intact at all times.  Sponge bathe only. You may shower when released by your doctor.  Do not lift anything over 10 pounds (4.5 kg)  Ask your doctor when you may go back to your normal activities like work or exercising  Avoid using creams until your skin has healed to lower your risk of infection or told by your doctor.  Wear your supportive/surgical bra at all times except when showering. Put on another open front supportive bra when washing the old one.   Tops that button up the front are easier to put on and take off than those that slip over your head.  Your bowel movements may take some time to get back to normal. Eat small meals high in fiber to avoid hard stools. Drink 6 to 8 glasses of water each day.  Try to walk each day. Start by walking a little more than you did the day before. Walking boosts blood flow and helps prevent lung, belly, and blood problems.  What follow-up care is needed?   Your doctor may ask you to make visits to the office to check on your progress. Be sure to keep your visits.  Any drains placed in your breasts will be removed 2 to 4 days after surgery.  Will physical activity be limited?   You will need to limit your activity for a while. Avoid lifting, sports, and sex until your doctor says physical activity is OK. Talk with your doctor about the right amount of activity for you.  When do I need to call the doctor?   Signs of infection, such as a fever of  100.4°F (38°C) or higher, chills, wound that will not heal.  Signs of wound infection, such as swelling, redness, warmth around the wound; too much pain when touched; yellowish, greenish, or bloody discharge; foul smell coming from the cut site; cut site opens up.  Drain seems clogged and there is fluid under your surgery cuts  Cough, shortness of breath, chest pain  Upset stomach and throwing up that are not helped by nausea drugs  Pain that is not helped by pain drugs.   Patient Education     [place] Drain      What will the results be?   This drain gets rid of extra fluid from your cut site. This will help it to get better faster.  What happens after the procedure?   You may be sore where the drain was put in. Your doctor will give you drugs for the pain.  You may need to stay in the hospital until you are well enough to go home. Your doctor will watch to see how much fluid is in the bulb each day.  The nurses will empty the drain when it gets about half full or at certain times during the day and measure the amount of fluid emptied.  What care is needed at home?   Wash your hands with soap and water every time before and after you empty the drain. Do not change your dressing around the YASMEEN drain.  Do not put any lotions, creams, or oils around the YASMEEN drain site.  Empty the drain. Remove the stopper at the end. Pour out the drainage. Your doctor may want you to measure how much drainage is in the bulb. Then, squeeze the bulb to get rid of air and put the stopper back in place.  Care for the tube if there is a clot in it. Squeeze the tube over the clot. You can also squeeze the tube from your skin to the bulb and then let it go. This should open the tube.   Measure the amount of fluid in the drain ball after you empty it each day up to 3 times a day and write it down on a chart.  Look for signs of infection. Your doctor will want to know if you have a fever of 100.4°F (38°C) or higher, chills, if you get very  red and sore around the drain, or if the fluid in the drain turns cloudy or smells. Your doctor will want to know if the skin around the drain has any fluid or pus coming out of it.  Sleep on the side opposite to the YASMEEN drain. This prevents any kinking of the tubing or pulling out the suction bulb.  Avoid bumping the drain.  You can hold the drain up with a safety pin while you are moving around.   Ask your doctor about your activity level while you have your drain in place.  What follow-up care is needed?   Be sure to keep your follow up visit.  Your doctor will tell you when the drain may be removed.  What problems could happen?   Fluid leaking from the cut site  Bleeding  Infection  Clot in the tube  Tube and drain falls out  Cut area opens up  Drain stops working  When do I need to call the doctor?   Signs of infection at the drain site. These include swelling, redness, warmth around the wound, too much pain when touched, yellowish or greenish or bloody discharge, foul smell coming from the cut site.  Drain becomes loose, comes apart, abruptly stops draining, or falls out   Patient Education       Liposuction     What happens after the procedure?   You may feel pain. Your doctor will give you drugs for this.  There will be swelling around the suctioned area. Your doctor will give you a compressive garment to wear over the area for support. You may need to wear this for 4 weeks or more.  There may also be bruising after the suctioned area. This is normal and will go away on its own after a few days.  Your doctor may leave your cuts open to drain liquid.  What care is needed at home?   There may be bruising around the suctioned area.  The area may be numb or itchy. This will get better.  Do not remove your dressings. Keep clean, dry, and intact at all times. If you have dermabond (surgical glue), do not scrub off or try to remove. It will fall off within 1-2 weeks.   Sponge bathe only. You may take a shower when  released by your doctor.    Do not lift anything over 10 pounds.  Ask your doctor when you may go back to your normal activities like work or driving.  Be sure to wash your hands before and after touching your wound or dressing.  When bathing, remove the compression garment and bandage. Allow soapy water to run down over your wound and then pat dry. Do not scrub or rub at the wound. Replace the bandage with a clean one.  What follow-up care is needed?   Be sure to keep your follow up visit.  If you plan to have liposuction on other body areas, you will have to wait 3 to 4 weeks.  Talk to your doctor about creams that may stop scarring.  What lifestyle changes are needed?   Exercise. This will help firm the loose skin. Do this when the area is fully healed and when your doctor says it is safe to do so.  Talk to your doctor about the right amount of activity for you.  Avoid activities where you can easily get bumped. Ask your doctor about when and what type of exercise will be good for you to start.  What problems could happen?   Infection  Scarring  Outcome is not as successful as expected  Poor wound healing  Need for liposuction again to achieve the desired look  When do I need to call the doctor?   Signs of infection. These include a fever of 100.4°F (38°C) or higher, chills, wound that will not heal.  Signs of wound infection. These include swelling, redness, warmth around the wound; too much pain when touched; yellowish, greenish, or bloody discharge; foul smell coming from the cut site; cut site opens up.  Too much blood from the drain site  Very bad pain in the area even after you take the drugs for pain  Trouble breathing or chest pain  Very bad upset stomach and throwing up

## 2025-01-16 NOTE — OP NOTE
[facility] [address] [place] [address]    PATIENT NAME:      GALDINO POST  YOB: 1970  CSN:               [id number]  MRN:               [id number]  ADMIT DATE:        01/16/2025 05:37:00  PHYSICIAN:         Ubaldo David MD                          OPERATIVE REPORT      DATE OF SURGERY:    01/16/2025 00:00:00    SURGEON:  Ubaldo David MD    ANESTHESIA:  General.    PREOPERATIVE DIAGNOSES:  History of bilateral mastectomy and bilateral deep   inferior epigastric artery  flap breast reconstruction.    POSTOPERATIVE DIAGNOSES:  History of bilateral mastectomy and bilateral deep   inferior epigastric artery  flap breast reconstruction.    PROCEDURES PERFORMED:    1. Bilateral breast debulking of previous breast flap.  2. Bilateral axillary liposuction.  3. Bilateral removal of redundant axillary skin and subcutaneous tissue   measuring approximately 15 x 8 cm per side.    INDICATION FOR PROCEDURE:  This is a 54-year-old female.  She is status post   bilateral mastectomy and breast reconstruction with KENNETH flaps.  She healed with   significant amount of fat necrosis occurring in the left breast flap and some   noticeable asymmetry.  As well, she had redundant axillary skin and adipose   tissue.  Options were discussed.  The patient elected to proceed with the   above-listed procedures.  The risks, benefits, potential complications of   surgery were discussed in detail.  She signed informed consent.    DESCRIPTION OF PROCEDURE:  The patient was identified in the preoperative   holding area.  She was marked in upright position.  She was then taken to the   operating room, placed in supine position, general endotracheal anesthesia   provided.  She was prepped and draped in a sterile surgical fashion.  Time-out   was taken and everyone in agreement.  We started  the procedure by infiltrating   tumescent solution through a small stab incision in the previous scar on the   right and left side to the axillary area.  After allowing time for   vasoconstriction, then we performed power-assisted liposuction.  Please note, 1   L of tumescent solution was used and approximately 1 L of Lipoaspirate was   removed; 500 cc approximately on each side.  After this was accomplished, we   then proceeded with flap debulking portion of procedure.  I initially started on   the patient's left breast.  We marked an incision to remove excess redundant   skin that was noted to be on the breast flap as well as some in the previous   mastectomy flap at the ellipse along the superior border.  The skin and   subcutaneous tissue was removed using electrocautery after incising with a 10   blade, passed off the field as a specimen.  We then removed the redundant amount   of excess tissue that was noted to be very hard and consistent with fat   necrosis in the upper pole of the breast using electrocautery.  I did shape the   breast to be more round at the upper pole, ensured hemostasis in the field.  We   then performed the exact same procedure on the patient's left breast where we   similarly made an elliptical incision to remove excess skin that was there at   the upper pole of the breast along the upper pole of the incision and also   removed the excess flap to match the other side.  Evaluated this in upright and   supine positions several times to achieve as good symmetry as I possibly could.    After completing this portion of the procedure, we then marked out the excess   skin and redundant tissue in the axillary areas.  This was marked out on the   right and left sides.  I removed the skin initially excising it with a 10 blade   scalpel, then further removed with electrocautery.  Hemostasis was ensured in   both areas.  I injected Exparel throughout the field.  I then placed a 15 round   Janes  drain in each wound bed, exited inferior laterally and secured to the skin   with 2-0 silk sutures on both sides and then we closed the incisions with 0   Vicryl sutures in deep dermis and subcutaneous tissue, 2-0 subcuticular   Stratafix.  Dermabond tape was used as a dressing.  There were no complications.    All counts were correct.        ______________________________  MD PERFECTO Quiñones/AQS  DD:  01/16/2025  Time:  09:24AM  DT:  01/16/2025  Time:  10:39AM  Job #:  [id number]      OPERATIVE REPORT

## 2025-01-16 NOTE — DISCHARGE SUMMARY
[facility] Surgical - Periop Services  Discharge Note  Short Stay    Procedure(s) (LRB):  BREAST REVISION SURGERY (Noe breast flap debulking and excision of fat necrosis, bilateral axillary liposuction and removal of redundant skin and subcutaneous tissue) (Bilateral)  LIPOSUCTION (Noe breast flap debulking and excision of fat necrosis, bilateral axillary liposuction and removal of redundant skin and subcutaneous tissue (Bilateral)      OUTCOME: Patient tolerated treatment/procedure well without complication and is now ready for discharge.    DISPOSITION: Home or Self Care    FINAL DIAGNOSIS:  <principal problem not specified>    FOLLOWUP: In clinic    DISCHARGE INSTRUCTIONS:  No discharge procedures on file.     TIME SPENT ON DISCHARGE: 5 minutes

## 2025-01-16 NOTE — TRANSFER OF CARE
"Anesthesia Transfer of Care Note    Patient: Brenden Franklin Parisa    Procedure(s) Performed: Procedure(s) (LRB):  BREAST REVISION SURGERY (Noe breast flap debulking and excision of fat necrosis, bilateral axillary liposuction and removal of redundant skin and subcutaneous tissue) (Bilateral)  LIPOSUCTION (Noe breast flap debulking and excision of fat necrosis, bilateral axillary liposuction and removal of redundant skin and subcutaneous tissue (Bilateral)    Patient location: PACU    Anesthesia Type: general    Transport from OR: Transported from OR on room air with adequate spontaneous ventilation    Post pain: adequate analgesia    Post assessment: no apparent anesthetic complications    Post vital signs: stable    Level of consciousness: responds to stimulation    Nausea/Vomiting: no nausea/vomiting    Complications: none    Transfer of care protocol was followed      Last vitals: Visit Vitals  /60   Pulse 70   Temp 36.3 °C (97.3 °F)   Resp 15   Ht 5' 4" (1.626 m)   Wt 79.8 kg (175 lb 14.8 oz)   SpO2 99%   Breastfeeding No   BMI 30.20 kg/m²     "

## 2025-01-16 NOTE — ANESTHESIA POSTPROCEDURE EVALUATION
Anesthesia Post Evaluation    Patient: Brenden Franklin Parisa    Procedure(s) Performed: Procedure(s) (LRB):  BREAST REVISION SURGERY (Noe breast flap debulking and excision of fat necrosis, bilateral axillary liposuction and removal of redundant skin and subcutaneous tissue) (Bilateral)  LIPOSUCTION (Noe breast flap debulking and excision of fat necrosis, bilateral axillary liposuction and removal of redundant skin and subcutaneous tissue (Bilateral)    Final Anesthesia Type: general      Patient location during evaluation: PACU  Patient participation: Yes- Able to Participate  Level of consciousness: oriented and awake  Post-procedure vital signs: reviewed and stable  Pain management: adequate  Airway patency: patent    PONV status at discharge: No PONV  Anesthetic complications: no      Cardiovascular status: hemodynamically stable  Respiratory status: spontaneous ventilation, unassisted and nasal cannula  Hydration status: euvolemic  Follow-up not needed.  Comments: [facility]              Vitals Value Taken Time   /88 01/16/25 1031   Temp 36.7 °C (98.1 °F) 01/16/25 1015   Pulse 57 01/16/25 1031   Resp 14 01/16/25 1021   SpO2 97 % 01/16/25 1031   Vitals shown include unfiled device data.      No case tracking events are documented in the log.      Pain/Zohaib Score: Pain Rating Prior to Med Admin: 0 (1/16/2025  6:05 AM)  Zohaib Score: 9 (1/16/2025 10:15 AM)

## 2025-01-16 NOTE — ANESTHESIA PREPROCEDURE EVALUATION
01/15/2025  Brenden Franklin NirmalBerry is a 54 y.o., female, who presents for the following:    Procedures:      BREAST REVISION SURGERY (Noe breast flap debulking and excision of fat necrosis, bilateral axillary liposuction and removal of redundant skin and subcutaneous tissue) (Bilateral: Chest)      LIPOSUCTION (Noe breast flap debulking and excision of fat necrosis, bilateral axillary liposuction and removal of redundant skin and subcutaneous tissue (Bilateral: Axilla)   Anesthesia type: General   Diagnosis:      Personal history of malignant neoplasm of breast [Z85.3]      Encounter for breast reconstruction following mastectomy [Z42.1]      Disproportion of reconstructed breast [N65.1]   Pre-op diagnosis:      Personal history of malignant neoplasm of breast [Z85.3]      Encounter for breast reconstruction following mastectomy [Z42.1]      Disproportion of reconstructed breast [N65.1]   Location: [facility] OR  / [facility] OR   Surgeons: Ubaldo David MD     Past Medical History:   Diagnosis Date    Abnormal uterine bleeding 2006    Anemia     Anxiety     Cancer     right breast    Depression 1992    Diabetes mellitus     Endometriosis of uterus     Fibroid     Heart attack 2010    Hyperlipidemia     Hypertension 10/04/2022    Menopause 12/2017    Obesity, unspecified     BMI 32.10    Pregnancy 1987    Miscarriage    Sleep apnea     no cpap     LAB: reviewed / acceptable    EKG: NSR w/ SA    Pre-op Assessment    I have reviewed the Patient Summary Reports.     I have reviewed the Nursing Notes. I have reviewed the NPO Status.   I have reviewed the Medications.     Review of Systems  Anesthesia Hx:  No problems with previous Anesthesia             Denies Family Hx of Anesthesia complications.    Denies Personal Hx of Anesthesia complications.                    Social:  Former Smoker       Cardiovascular:      Hypertension               CAD, s/p MI, NO STENT  > 4 METS activity                           Pulmonary:        Sleep Apnea PIETER resolved w/ WT Loss               Hepatic/GI:     GERD                Musculoskeletal:  Arthritis               Neurological:      Headaches     Lumbar Radiculopathy                            Endocrine:  Diabetes, type 2           Psych:   anxiety depression Bipolar  Posttraumatic Stress D/O               Physical Exam  General: Alert and Oriented    Airway:  Mallampati: III   Mouth Opening: Small, but > 3cm  TM Distance: Normal  Tongue: Normal  Neck ROM: Normal ROM    Dental:  Intact    Chest/Lungs:  Normal Respiratory Rate    Heart:  Rate: Normal  Rhythm: Regular Rhythm        Anesthesia Plan  Type of Anesthesia, risks & benefits discussed:    Anesthesia Type: Gen ETT  Intra-op Monitoring Plan: Standard ASA Monitors  Post Op Pain Control Plan: multimodal analgesia and IV/PO Opioids PRN  Induction:  IV  Airway Plan: Direct, Post-Induction  Informed Consent: Informed consent signed with the Patient and all parties understand the risks and agree with anesthesia plan.  All questions answered.   ASA Score: 3  Day of Surgery Review of History & Physical: H&P Update referred to the surgeon/provider.  Anesthesia Plan Notes: Premedication: Midazolam  Special Technique: Preemptive analgesia with Neurontin, zofran, acetaminophen   PONV prophylaxis with intraop zofran, decadron     Ready For Surgery From Anesthesia Perspective.     .

## 2025-01-16 NOTE — CARE UPDATE
Patient awakened,londono,rejected oral airway, oriented/reassured her, she denied c/o, exchanging well, will maintain quiet for her to rest.

## 2025-01-17 VITALS
TEMPERATURE: 98 F | BODY MASS INDEX: 30.04 KG/M2 | WEIGHT: 175.94 LBS | OXYGEN SATURATION: 96 % | RESPIRATION RATE: 18 BRPM | DIASTOLIC BLOOD PRESSURE: 86 MMHG | HEART RATE: 64 BPM | SYSTOLIC BLOOD PRESSURE: 143 MMHG | HEIGHT: 64 IN

## 2025-01-20 LAB — PSYCHE PATHOLOGY RESULT: NORMAL

## 2025-03-04 DIAGNOSIS — Z17.0 MALIGNANT NEOPLASM OF UPPER-OUTER QUADRANT OF RIGHT BREAST IN FEMALE, ESTROGEN RECEPTOR POSITIVE: ICD-10-CM

## 2025-03-04 DIAGNOSIS — C50.411 MALIGNANT NEOPLASM OF UPPER-OUTER QUADRANT OF RIGHT BREAST IN FEMALE, ESTROGEN RECEPTOR POSITIVE: ICD-10-CM

## 2025-03-05 RX ORDER — LETROZOLE 2.5 MG/1
2.5 TABLET, FILM COATED ORAL DAILY
Qty: 90 TABLET | Refills: 0 | Status: SHIPPED | OUTPATIENT
Start: 2025-03-05

## 2025-04-24 NOTE — PROGRESS NOTES
Subjective:       Patient ID: Brenden Mccauley is a 55 y.o. female.    Surgeon: Dr. Christina Romero    1. Right Breast Cancer Stage IA (T2(m2) N0(I+)M0)--Diagnosed 24    2. Iron Deficiency Anemia h/o Gastric sleeve in     Biopsy/pathology: Right and left breast biopsies done 24--10:00 4CMFN mass with invasive mammary carcinoma with mixed ductal and lobular features, Grade 2, at least 9mm, associated calcifications, ER 86%, DC 80%, Her2 1+ negative/low, Ki67 23%, 3:00 middle depth calcifications with stromal fibrosis, sclerosing adenosis; Left breast UIQ middle depth calcifications with stromal fibrosis, apocrine metaplasia, sclerosing adenosis, fibroadenomatoid change.  Surgery/pathology:  Bilateral mastectomies done 3/15/24--Right breast with multifocal invasive carcinoma with mixed ductal and lobular features 26mm and 3mm, +PNI, 2/11 lymph nodes with isolated tumor cells; left breast mastectomy with benign breast tissue, fibrocystic changes, site of prior biopsy. Oncotype DX testing with score 14, risk of recurrence with AI alone 4%, no chemotherapy benefit.   Imagin. Screening MMG done 23--on right irregular mass with associated calcifications at 9 o'clock posterior depth.  There are calcifications in the right breast lower inner quadrant, middle depth. There is an asymmetry in the medial right breast, anterior depth; on left calcifications noted at 3 o'clock, posterior depth, in the medial left breast, anterior to middle depth.  There is an asymmetry with possible associated architectural distortion in the medial left breast, middle depth. BIRADS-0; additional imaging needed. 2. Bilateral diagnostic MMG/US done 1/3/24--right MMG persistent 1.4cm irregular mass with spiculated margins and associated architectural distortion at 10:00, diffuse calcifications throughout right breast, on US 1.4cm irregular hypoechoic mass, right axilla with no adenopathy; left MMG with diffuse  calcifications left breast most concentrated in UI and UO quadrants, assymetry in left breast effaced out, left US with two morphologically similar parallel oval avascular hypoechoic masses with circumscribed margins  measuring 0.9 cm x 0.2 cm x 0.5 cm; at 4 o'clock 5 cm FN and 1.0 cm x 0.3 cm x 0.8 cm; at 4 o'clock 3 cm from the nipple, masses are without significant change mammographically dating back at least to the 2021 exam, and are also likely without significant change dating back to the 2017 exam allowing for differences in technique and interval reduction mammoplast. No suspicious sonographic abnormality is evident in the left breast.. Left axilla demonstrates no significant left axillary adenopathy. BIRADS-4 suspicious; biopsy recommended.  3. Breast MRI 2/8/24-- Right breast upper outer quadrant, lower outer quadrant, and central region posterior to middle depths irregular heterogeneously enhancing spiculated mass with regional clumped non mass enhancement extending away superiorly, inferiorly, and anteriorly measures 5.7 x 2.6 x 7.2 cm ([id number]) in total extent and correlates with biopsy-proven malignancy.  BI-RADS 6: Known biopsy-proven malignancy. No abnormal enhancement associated with the recent benign stereotactic guided core needle biopsy sites for calcifications in both breasts (right breast 3:00 axis middle depth and left breast upper inner quadrant middle depth).  There are multiple hematomas in the right breast upper inner and lower inner quadrants in the anterior depth related to the 3:00 axis middle depth core needle biopsy.  BI-RADS 2: Benign. No MRI evidence of malignancy in the left breast.    [employer] BRCA1 and BRCA2 panel, and 84 individual genes with +heterozygous for BLM mutation, pathogenic variant (predisposition to colorectal cancer), POLE heterozygous mutation VUS.     DEXA:  06/17/24--AP spine 0.6, left femoral neck -0.6, left total -0.2, right femoral neck -0.4, right total -0.1.  Normal bone density.     Treatment history:  Bilateral mastectomies with right SLN biopsies with tissue expander reconstruction done 3/15/24.   InFeD 1500mg x 1 dose on 24.     Current treatment plan:   Adjuvant Femara started on 24.     Chief Complaint: Other Misc (Pt reports no concerns today./)    HPI  Patient presents for follow-up of breast cancer. She is doing well. Currently on Femara since 2024. Tolerating well with the exception of hot flashes. She underwent bilateral breast reconstruction with Dr. David in July which went well. She developed large fat necrosis to her left breast and will had surgery in January to remove. She may need additional surgery after and is considering implants. She returned from a trip to [place]. No new problems reported.     Past Medical History:   Diagnosis Date    Abnormal uterine bleeding     Anemia     Anxiety     Cancer     right breast    Depression     Diabetes mellitus     Endometriosis of uterus     Fibroid     Heart attack     Hyperlipidemia     Hypertension 10/04/2022    Menopause 2017    Obesity, unspecified     BMI 32.10    Pregnancy     Miscarriage    Sleep apnea     no cpap      Past Surgical History:   Procedure Laterality Date    ABDOMINAL SURGERY      APPENDECTOMY      BREAST REVISION SURGERY Bilateral 2025    Procedure: BREAST REVISION SURGERY (Noe breast flap debulking and excision of fat necrosis, bilateral axillary liposuction and removal of redundant skin and subcutaneous tissue);  Surgeon: Ubadlo David MD;  Location: [facility];  Service: Plastics;  Laterality: Bilateral;    BREAST SURGERY Bilateral     breast reduction    CERVICAL BIOPSY  W/ LOOP ELECTRODE EXCISION      CERVIX LESION DESTRUCTION       SECTION      COSMETIC SURGERY      Breast reduction    DILATION AND CURETTAGE OF UTERUS  1987    ENDOMETRIAL ABLATION      gastric sleeve      HYSTERECTOMY       HYSTEROSCOPY      INSERTION OF BREAST TISSUE EXPANDER Bilateral 3/15/2024    Procedure: INSERTION, TISSUE EXPANDER, BREAST (bilateral breast recon with placement of tissue expanders and acellular dermal matrix, use of intraoperative fluoroscein angiography);  Surgeon: Ubaldo David MD;  Location: [facility];  Service: Plastics;  Laterality: Bilateral;  300mL filled for each tissue expander    JOINT REPLACEMENT  2020/2022    bilateral knees    LIPOSUCTION Bilateral 1/16/2025    Procedure: LIPOSUCTION (Noe breast flap debulking and excision of fat necrosis, bilateral axillary liposuction and removal of redundant skin and subcutaneous tissue;  Surgeon: Ubaldo David MD;  Location: [facility] OR;  Service: Plastics;  Laterality: Bilateral;    LUMBAR LAMINECTOMY N/A 05/29/2023    Procedure: LAMINECTOMY, SPINE, LUMBAR L4 SNS:SSEP/EMG;  Surgeon: Kamlesh Gomez MD;  Location: [facility] OR;  Service: Orthopedics;  Laterality: N/A;    LUMBAR LAMINECTOMY N/A 08/22/2023    Procedure: LAMINECTOMY, SPINE, LUMBAR L4;  Surgeon: Kamlesh Gomez MD;  Location: [address];  Service: Neurosurgery;  Laterality: N/A;    RECONSTRUCTION OF BREAST WITH DEEP INFERIOR EPIGASTRIC ARTERY  (KENNETH) FLAP Bilateral 7/23/2024    Procedure: RECONSTRUCTION, BREAST, USING KENNETH SKIN FLAP (Bilateral breast recon with KENNETH flaps, possible TRAM flaps, possible ventral hernia with mesh, possible rectus muscle plication, use of intraoperative fluoroscein angiography);  Surgeon: Ubaldo David MD;  Location: [facility];  Service: Plastics;  Laterality: Bilateral;    SENTINEL LYMPH NODE BIOPSY Right 3/15/2024    Procedure: BIOPSY, LYMPH NODE, SENTINEL - RIGHT MagTrace Injected in office SentiMag needed No NUC MED;  Surgeon: Christina Romero MD;  Location: [facility] OR;  Service: General;  Laterality: Right;  MagTrace Injected in office  SentiMag needed  No NUC MED    SIMPLE MASTECTOMY Bilateral 3/15/2024    Procedure: MASTECTOMY, SIMPLE - BILATERAL;   Surgeon: Christina Romero MD;  Location: [facility] OR;  Service: General;  Laterality: Bilateral;    SPINE SURGERY      TISSUE EXPANDER REMOVAL Bilateral 2024    Procedure: REMOVAL, TISSUE EXPANDER;  Surgeon: Ubaldo David MD;  Location: [facility] OR;  Service: Plastics;  Laterality: Bilateral;    TONSILLECTOMY      TOTAL KNEE ARTHROPLASTY Bilateral     TUBAL LIGATION      WISDOM TOOTH EXTRACTION       Family History   Problem Relation Name Age of Onset    Diabetes type I Mother Paul Hamm     Multiple myeloma Mother Paul Hamm 55    Diabetes Mother Paul Hamm     Hypertension Mother Paul Hamm     Kidney disease Mother Paul Hamm     Alzheimer's disease Father              No Known Problems Sister      No Known Problems Brother      Pancreatic cancer Maternal Grandmother  70    Breast cancer Paternal Grandmother Claudette Hamm 55    Liver cancer Paternal Grandmother Claudette Hamm 55    Breast cancer Paternal Aunt Lesley Fulton 62     Social History     Socioeconomic History    Marital status: Single   Tobacco Use    Smoking status: Former     Average packs/day: 0.5 packs/day for 5.0 years (2.5 ttl pk-yrs)     Types: Cigarettes     Start date: 2018     Quit date: 2018     Years since quittin.3    Smokeless tobacco: Never    Tobacco comments:     1 pk every 3 days    Substance and Sexual Activity    Alcohol use: Not Currently     Alcohol/week: 1.0 standard drink of alcohol     Types: 1 Glasses of wine per week    Drug use: Never    Sexual activity: Not Currently     Partners: Male     Birth control/protection: Abstinence     Social Drivers of Health     Financial Resource Strain: Low Risk  (2025)    [facility]sidi[facility] and Affiliates    Overall Financial Resource Strain (CARDIA)     Difficulty of Paying Living Expenses: Not hard at all   Food Insecurity: No Food Insecurity (2025)    [facility]    Hunger Vital Sign     Worried About Running Out of Food in the Last Year: Never true     Ran Out of Food in the Last Year: Never true   Transportation Needs: No Transportation Needs (4/28/2025)    [facility]    PRAPARE - Transportation     Lack of Transportation (Medical): No     Lack of Transportation (Non-Medical): No   Physical Activity: Sufficiently Active (4/28/2025)    [facility]    Exercise Vital Sign     Days of Exercise per Week: 7 days     Minutes of Exercise per Session: 30 min   Stress: Stress Concern Present (4/28/2025)    [facility]    [employer] - Occupational Stress Questionnaire     Feeling of Stress : To some extent   Housing Stability: Low Risk  (4/28/2025)    [facility]    Housing Stability Vital Sign     Unable to Pay for Housing in the Last Year: No     Number of Times Moved in the Last Year: 0     Homeless in the Last Year: No       Review of patient's allergies indicates:   Allergen Reactions    Latex, natural rubber Hives, Anxiety, Itching, Palpitations and Swelling     Other Reaction(s): Unknown    Tomato (solanum lycopersicum) Hives and Swelling      Current Outpatient Medications on File Prior to Visit   Medication Sig Dispense Refill    aspirin (ECOTRIN) 81 MG EC tablet Take 81 mg by mouth once daily.      azelastine (ASTELIN) 137 mcg (0.1 %) nasal spray SMARTSIG:Both Nares      blood-glucose meter kit Check sugars daily. For T2DM without insulin (E11.9)      busPIRone (BUSPAR) 30 MG Tab Take 30 mg by mouth 2 (two)  times a day.      carvediloL (COREG) 3.125 MG tablet Take 3.125 mg by mouth 2 (two) times daily.      cetirizine (ZYRTEC) 10 MG tablet 1 tablet Orally Once a day      FEROSUL 325 mg (65 mg iron) Tab tablet Take by mouth every other day.      fexofenadine (ALLEGRA) 180 MG tablet Take 1 tablet by mouth every morning.      ipratropium (ATROVENT) 42 mcg (0.06 %) nasal spray by Each Nostril route.      lamoTRIgine (LAMICTAL) 150 MG Tab TAKE ONE TABLET BY MOUTH AT BEDTIME SEIZURES FOR MOOD STABILIZATION      letrozole (FEMARA) 2.5 mg Tab Take 1 tablet (2.5 mg total) by mouth once daily. 90 tablet 0    metFORMIN (GLUCOPHAGE) 500 MG tablet Take 500 mg by mouth 2 (two) times daily with meals.      montelukast (SINGULAIR) 10 mg tablet Take 10 mg by mouth once daily.      rosuvastatin (CRESTOR) 20 MG tablet Take 20 mg by mouth once daily.      sertraline (ZOLOFT) 100 MG tablet Take 2 tablets by mouth once daily.      SODIUM FLUORIDE 5000 DRY MOUTH 1.1 % Pste       sumatriptan (IMITREX) 100 MG tablet Take 100 mg by mouth.      traZODone (DESYREL) 150 MG tablet Take 1 tablet by mouth every evening.      vitamin D (VITAMIN D3) 1000 units Tab Take 1,000 Units by mouth once daily.      blood sugar diagnostic (ONETOUCH ULTRA TEST) Strp CHECK SUGARS DAILY      blood sugar diagnostic (TRUE METRIX GLUCOSE TEST STRIP) Strp CHECK SUGARS DAILY      chlorhexidine (PERIDEX) 0.12 % solution GENTLY DAB ON SURGICAL SITE TWICE A DAY FOR 7-10 DAYS      diazePAM (VALIUM) 5 MG tablet Take 5 mg by mouth. (Patient not taking: Reported on 5/1/2025)      fluoride, sodium, (PREVIDENT 5000 PLUS) 1.1 % Crea       HYDROcodone-acetaminophen (NORCO) 7.5-325 mg per tablet Take by mouth.      senna (SENOKOT) 8.6 mg tablet Take 2 tablets by mouth once daily. (Patient not taking: Reported on 5/1/2025)       No current facility-administered medications on file prior to visit.      Review of Systems   Constitutional:  Negative for appetite change and unexpected  "weight change.   HENT:  Negative for mouth sores.    Eyes:  Negative for visual disturbance.   Respiratory:  Negative for cough and shortness of breath.    Cardiovascular:  Negative for chest pain.   Gastrointestinal:  Negative for abdominal pain and diarrhea.   Genitourinary:  Negative for frequency.   Musculoskeletal:  Negative for back pain.   Neurological:  Negative for headaches.   Hematological:  Negative for adenopathy.   Psychiatric/Behavioral:  The patient is not nervous/anxious.          Vitals:    05/01/25 1329   BP: (!) 144/91   Pulse: 83   Resp: 14   Temp: 98.2 °F (36.8 °C)   TempSrc: Oral   SpO2: 98%   Weight: 80.2 kg (176 lb 11.2 oz)   Height: 5' 4" (1.626 m)     Physical Exam  Constitutional:       Appearance: Normal appearance. She is overweight.   HENT:      Head: Normocephalic.      Nose: Nose normal.      Mouth/Throat:      Mouth: Mucous membranes are moist.   Eyes:      Extraocular Movements: Extraocular movements intact.      Conjunctiva/sclera: Conjunctivae normal.   Cardiovascular:      Rate and Rhythm: Normal rate and regular rhythm.   Pulmonary:      Effort: Pulmonary effort is normal.      Breath sounds: Normal breath sounds.   Chest:      Comments: S/p bilateral mastectomies with breast reconstruction. Right breast with small areas of fat necrosis laterally, along incision line. Left breast with areas of fat necrosis taking up a majority of the breast. No axillary adenopathy.   Abdominal:      General: Bowel sounds are normal. There is no distension.      Palpations: Abdomen is soft.      Tenderness: There is no abdominal tenderness.   Musculoskeletal:         General: Normal range of motion.   Skin:     General: Skin is warm.   Neurological:      General: No focal deficit present.      Mental Status: She is alert and oriented to person, place, and time.   Psychiatric:         Mood and Affect: Mood normal.         Behavior: Behavior is cooperative.         Judgment: Judgment normal. "       Lab Visit on 04/28/2025   Component Date Value Ref Range Status    Iron Binding Capacity Unsaturated 04/28/2025 209  70 - 310 ug/dL Final    Iron Level 04/28/2025 61  50 - 170 ug/dL Final    Transferrin 04/28/2025 245  180 - 382 mg/dL Final    Iron Binding Capacity Total 04/28/2025 270  250 - 450 ug/dL Final    Iron Saturation 04/28/2025 23  20 - 50 % Final    Ferritin Level 04/28/2025 43.46  4.63 - 204.00 ng/mL Final    Sodium 04/28/2025 143  136 - 145 mmol/L Final    Potassium 04/28/2025 4.0  3.5 - 5.1 mmol/L Final    Chloride 04/28/2025 108 (H)  98 - 107 mmol/L Final    CO2 04/28/2025 27  22 - 29 mmol/L Final    Glucose 04/28/2025 117 (H)  74 - 100 mg/dL Final    Blood Urea Nitrogen 04/28/2025 7.4 (L)  9.8 - 20.1 mg/dL Final    Creatinine 04/28/2025 0.76  0.55 - 1.02 mg/dL Final    Calcium 04/28/2025 9.3  8.4 - 10.2 mg/dL Final    Protein Total 04/28/2025 7.7  6.4 - 8.3 gm/dL Final    Albumin 04/28/2025 3.6  3.5 - 5.0 g/dL Final    Globulin 04/28/2025 4.1 (H)  2.4 - 3.5 gm/dL Final    Albumin/Globulin Ratio 04/28/2025 0.9 (L)  1.1 - 2.0 ratio Final    Bilirubin Total 04/28/2025 0.2  <=1.5 mg/dL Final    ALP 04/28/2025 114  40 - 150 unit/L Final    ALT 04/28/2025 10  0 - 55 unit/L Final    AST 04/28/2025 16  11 - 45 unit/L Final    eGFR 04/28/2025 >60  mL/min/1.73/m2 Final    Estimated GFR calculated using the CKD-EPI creatinine (2021) equation.    Anion Gap 04/28/2025 8.0  mEq/L Final    BUN/Creatinine Ratio 04/28/2025 10   Final    WBC 04/28/2025 7.84  4.50 - 11.50 x10(3)/mcL Final    RBC 04/28/2025 4.39  4.20 - 5.40 x10(6)/mcL Final    Hgb 04/28/2025 13.3  12.0 - 16.0 g/dL Final    Hct 04/28/2025 39.7  37.0 - 47.0 % Final    MCV 04/28/2025 90.4  80.0 - 94.0 fL Final    MCH 04/28/2025 30.3  27.0 - 31.0 pg Final    MCHC 04/28/2025 33.5  33.0 - 36.0 g/dL Final    RDW 04/28/2025 13.5  11.5 - 17.0 % Final    Platelet 04/28/2025 277  130 - 400 x10(3)/mcL Final    MPV 04/28/2025 10.3  7.4 - 10.4 fL Final    Neut  % 04/28/2025 59.9  % Final    Lymph % 04/28/2025 33.2  % Final    Mono % 04/28/2025 5.9  % Final    Eos % 04/28/2025 0.5  % Final    Basophil % 04/28/2025 0.4  % Final    Imm Grans % 04/28/2025 0.1  % Final    Neut # 04/28/2025 4.70  2.1 - 9.2 x10(3)/mcL Final    Lymph # 04/28/2025 2.60  0.6 - 4.6 x10(3)/mcL Final    Mono # 04/28/2025 0.46  0.1 - 1.3 x10(3)/mcL Final    Eos # 04/28/2025 0.04  0 - 0.9 x10(3)/mcL Final    Baso # 04/28/2025 0.03  <=0.2 x10(3)/mcL Final    Imm Gran # 04/28/2025 0.01  0.00 - 0.04 x10(3)/mcL Final         Assessment:       1. Malignant neoplasm of upper-outer quadrant of right breast in female, estrogen receptor positive    2. Iron deficiency anemia secondary to inadequate dietary iron intake        Plan:       Patient with right breast cancer s/p bilateral mastectomies done 3/15/24. Multifocal breast cancer, mixed ductal and lobular, 26mm and 3mm, with +PNI, and 2/11 lymph nodes with ITC, ER and MN strongly positive and Her2 negative with Ki67 23%.  Per NCCN, recommended to have Oncotype, and score returned low risk 14.   No radiation recommended due to only isolated tumor cells in LN.  Patient is post-menopausal, JENNIFER age 40 for fibroids and endometriosis, and was on +HRT when she was diagnosed as post-menopausal since age 44. HRT stopped at diagnosis.     Started Femara on 4/26/24. Tolerating well.   CBC today is good, anemia resolved. CMP unremarkable. Iron studies remain normal.   Baseline DEXA on 6/17/24 with normal bone density.   Continue Calcium and Vitamin D3.   S/p bilateral breast reconstruction in July with Dr. David. S/p revision of the left breast  to remove large area of fat necrosis in January 2025.    Continue with surveillance visits every 3 months.     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 There are no clear guidelines for screening but suggested to start screening colonoscopy age 38 and continue every 5 years.  She has not had one in several years. [facility]     She has known MARC. She has a h/o gastric sleeve surgery in 2020, so suspect this is the culprit.  Given InFeD 1500mg x 1 dose on 5/1/24.   Anemia remains resolved. Iron levels normal although they have decreased from her last appointment. Will monitor for now.     All questions answered at this time.      Segundo Eaton, FNPC      Visit today included increased complexity associated with the care of the episodic problem related to aromatase inhibitors, also addressed and managed the longitudinal care of the patient's chronic MARC and cancer history.

## 2025-04-28 ENCOUNTER — LAB VISIT (OUTPATIENT)
Dept: LAB | Facility: HOSPITAL | Age: 55
End: 2025-04-28
Attending: NURSE PRACTITIONER
Payer: OTHER GOVERNMENT

## 2025-04-28 DIAGNOSIS — C50.411 MALIGNANT NEOPLASM OF UPPER-OUTER QUADRANT OF RIGHT BREAST IN FEMALE, ESTROGEN RECEPTOR POSITIVE: ICD-10-CM

## 2025-04-28 DIAGNOSIS — Z17.0 MALIGNANT NEOPLASM OF UPPER-OUTER QUADRANT OF RIGHT BREAST IN FEMALE, ESTROGEN RECEPTOR POSITIVE: ICD-10-CM

## 2025-04-28 DIAGNOSIS — E66.01 MORBID OBESITY: ICD-10-CM

## 2025-04-28 DIAGNOSIS — D50.8 IRON DEFICIENCY ANEMIA SECONDARY TO INADEQUATE DIETARY IRON INTAKE: ICD-10-CM

## 2025-04-28 LAB
ALBUMIN SERPL-MCNC: 3.6 G/DL (ref 3.5–5)
ALBUMIN/GLOB SERPL: 0.9 RATIO (ref 1.1–2)
ALP SERPL-CCNC: 114 UNIT/L (ref 40–150)
ALT SERPL-CCNC: 10 UNIT/L (ref 0–55)
ANION GAP SERPL CALC-SCNC: 8 MEQ/L
AST SERPL-CCNC: 16 UNIT/L (ref 11–45)
BASOPHILS # BLD AUTO: 0.03 X10(3)/MCL
BASOPHILS NFR BLD AUTO: 0.4 %
BILIRUB SERPL-MCNC: 0.2 MG/DL
BUN SERPL-MCNC: 7.4 MG/DL (ref 9.8–20.1)
CALCIUM SERPL-MCNC: 9.3 MG/DL (ref 8.4–10.2)
CHLORIDE SERPL-SCNC: 108 MMOL/L (ref 98–107)
CO2 SERPL-SCNC: 27 MMOL/L (ref 22–29)
CREAT SERPL-MCNC: 0.76 MG/DL (ref 0.55–1.02)
CREAT/UREA NIT SERPL: 10
EOSINOPHIL # BLD AUTO: 0.04 X10(3)/MCL (ref 0–0.9)
EOSINOPHIL NFR BLD AUTO: 0.5 %
ERYTHROCYTE [DISTWIDTH] IN BLOOD BY AUTOMATED COUNT: 13.5 % (ref 11.5–17)
FERRITIN SERPL-MCNC: 43.46 NG/ML (ref 4.63–204)
GFR SERPLBLD CREATININE-BSD FMLA CKD-EPI: >60 ML/MIN/1.73/M2
GLOBULIN SER-MCNC: 4.1 GM/DL (ref 2.4–3.5)
GLUCOSE SERPL-MCNC: 117 MG/DL (ref 74–100)
HCT VFR BLD AUTO: 39.7 % (ref 37–47)
HGB BLD-MCNC: 13.3 G/DL (ref 12–16)
IMM GRANULOCYTES # BLD AUTO: 0.01 X10(3)/MCL (ref 0–0.04)
IMM GRANULOCYTES NFR BLD AUTO: 0.1 %
IRON SATN MFR SERPL: 23 % (ref 20–50)
IRON SERPL-MCNC: 61 UG/DL (ref 50–170)
LYMPHOCYTES # BLD AUTO: 2.6 X10(3)/MCL (ref 0.6–4.6)
LYMPHOCYTES NFR BLD AUTO: 33.2 %
MCH RBC QN AUTO: 30.3 PG (ref 27–31)
MCHC RBC AUTO-ENTMCNC: 33.5 G/DL (ref 33–36)
MCV RBC AUTO: 90.4 FL (ref 80–94)
MONOCYTES # BLD AUTO: 0.46 X10(3)/MCL (ref 0.1–1.3)
MONOCYTES NFR BLD AUTO: 5.9 %
NEUTROPHILS # BLD AUTO: 4.7 X10(3)/MCL (ref 2.1–9.2)
NEUTROPHILS NFR BLD AUTO: 59.9 %
PLATELET # BLD AUTO: 277 X10(3)/MCL (ref 130–400)
PMV BLD AUTO: 10.3 FL (ref 7.4–10.4)
POTASSIUM SERPL-SCNC: 4 MMOL/L (ref 3.5–5.1)
PROT SERPL-MCNC: 7.7 GM/DL (ref 6.4–8.3)
RBC # BLD AUTO: 4.39 X10(6)/MCL (ref 4.2–5.4)
SODIUM SERPL-SCNC: 143 MMOL/L (ref 136–145)
TIBC SERPL-MCNC: 209 UG/DL (ref 70–310)
TIBC SERPL-MCNC: 270 UG/DL (ref 250–450)
TRANSFERRIN SERPL-MCNC: 245 MG/DL (ref 180–382)
WBC # BLD AUTO: 7.84 X10(3)/MCL (ref 4.5–11.5)

## 2025-04-28 PROCEDURE — 85025 COMPLETE CBC W/AUTO DIFF WBC: CPT

## 2025-04-28 PROCEDURE — 83540 ASSAY OF IRON: CPT

## 2025-04-28 PROCEDURE — 82728 ASSAY OF FERRITIN: CPT

## 2025-04-28 PROCEDURE — 80053 COMPREHEN METABOLIC PANEL: CPT

## 2025-04-28 PROCEDURE — 36415 COLL VENOUS BLD VENIPUNCTURE: CPT

## 2025-05-01 ENCOUNTER — OFFICE VISIT (OUTPATIENT)
Dept: HEMATOLOGY/ONCOLOGY | Facility: CLINIC | Age: 55
End: 2025-05-01
Payer: OTHER GOVERNMENT

## 2025-05-01 VITALS
HEART RATE: 83 BPM | WEIGHT: 176.69 LBS | DIASTOLIC BLOOD PRESSURE: 91 MMHG | BODY MASS INDEX: 30.17 KG/M2 | SYSTOLIC BLOOD PRESSURE: 144 MMHG | OXYGEN SATURATION: 98 % | HEIGHT: 64 IN | RESPIRATION RATE: 14 BRPM | TEMPERATURE: 98 F

## 2025-05-01 DIAGNOSIS — C50.411 MALIGNANT NEOPLASM OF UPPER-OUTER QUADRANT OF RIGHT BREAST IN FEMALE, ESTROGEN RECEPTOR POSITIVE: Primary | ICD-10-CM

## 2025-05-01 DIAGNOSIS — D50.8 IRON DEFICIENCY ANEMIA SECONDARY TO INADEQUATE DIETARY IRON INTAKE: ICD-10-CM

## 2025-05-01 DIAGNOSIS — Z17.0 MALIGNANT NEOPLASM OF UPPER-OUTER QUADRANT OF RIGHT BREAST IN FEMALE, ESTROGEN RECEPTOR POSITIVE: Primary | ICD-10-CM

## 2025-05-01 PROCEDURE — 99215 OFFICE O/P EST HI 40 MIN: CPT | Mod: PBBFAC | Performed by: NURSE PRACTITIONER

## 2025-05-01 PROCEDURE — 99999 PR PBB SHADOW E&M-EST. PATIENT-LVL V: CPT | Mod: PBBFAC,,, | Performed by: NURSE PRACTITIONER

## 2025-05-01 PROCEDURE — 99214 OFFICE O/P EST MOD 30 MIN: CPT | Mod: S$PBB,,, | Performed by: NURSE PRACTITIONER

## 2025-05-01 PROCEDURE — G2211 COMPLEX E/M VISIT ADD ON: HCPCS | Mod: S$PBB,,, | Performed by: NURSE PRACTITIONER

## 2025-05-31 DIAGNOSIS — Z17.0 MALIGNANT NEOPLASM OF UPPER-OUTER QUADRANT OF RIGHT BREAST IN FEMALE, ESTROGEN RECEPTOR POSITIVE: ICD-10-CM

## 2025-05-31 DIAGNOSIS — C50.411 MALIGNANT NEOPLASM OF UPPER-OUTER QUADRANT OF RIGHT BREAST IN FEMALE, ESTROGEN RECEPTOR POSITIVE: ICD-10-CM

## 2025-06-02 ENCOUNTER — PATIENT MESSAGE (OUTPATIENT)
Dept: HEMATOLOGY/ONCOLOGY | Facility: CLINIC | Age: 55
End: 2025-06-02
Payer: OTHER GOVERNMENT

## 2025-06-02 RX ORDER — LETROZOLE 2.5 MG/1
TABLET, FILM COATED ORAL
Qty: 90 TABLET | Refills: 0 | Status: SHIPPED | OUTPATIENT
Start: 2025-06-02

## 2025-06-02 NOTE — TELEPHONE ENCOUNTER
I would give it a little more time from surgery before checking her labs again - maybe the end of this month or July.

## 2025-07-07 NOTE — PROGRESS NOTES
[facility] Breast Surg  Breast Surgical Oncology  Follow-Up Patient Office Visit       Referring Provider: No ref. 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   2025 Breast revision surgery    Interval History:  2025 - Brenden Mccauley presents for a 6 month follow up. She has been doing well. She continues Femara and is tolerating well. In the interval, had breast revision to remove fat necrosis to the bilateral breasts. She states this helped a little but still has signficant scarring on the left side especially. Pathology from the revision was benign confirming fat necrosis and scar. She plans to return for fat grafting.     HPI:  Brenden Mccauley is a 53 y.o. female who presents on 2024 for evaluation of newly diagnosed right  breast cancer. She has a history of reduction mammoplasty in  as well as previous right breast biopsy.     A detailed patient history was obtained and reviewed. She currently denies any breast issues including rashes, redness, pain, swelling, nipple discharge, or new lumps/masses.    MG breast density: Category C (heterogeneously dense)     Imagin2023 [facility] MG at [facility]= which revealed on R mg, there is an irregular mass with associated calcifications at 9 o'clock posterior depth.  There are calcifications in the right breast lower inner quadrant, middle depth. There is an asymmetry in the medial right breast,  anterior depth.     On L MG, there are calcifications noted at 3 o'clock, posterior depth, in the medial left breast, anterior to middle depth.  There is an asymmetry with possible associated architectural distortion in the medial left breast, middle depth. BIRADS-0; additional imaging needed    2. 1/3/2024 BL DG MG/ BL US BREAST COMPLETE at [facility]-        Right MG- at 10 o'clock middle/posterior depth, there is a persistent 1.4 cm irregular mass with spiculated margins and associated architectural distortion. There are punctate and amorphous calcifications associated with this mass. There are heterogeneous, punctate, and amorphous calcifications in a diffuse distribution throughout the right breast.  The most concentrated region of calcifications is located in the central/superior medial right breast anterior to middle depths. The asymmetry in the medial breast, anterior depth, effaces into normal-appearing fibroglandular tissue.   There are diffuse post surgical changes of the right breast related to previous reduction mammoplasty.          Right US-  At 10 o'clock 4 cm FN, there is a 1.4 cm x 0.9 cm x 1.4 cm irregular hypoechoic mass with spiculated and indistinct margins, posterior shadowing, associated architectural distortion, and associated echogenic calcifications. Benign simple cysts seen at 11 o'clock and 1 o'clock subareolar. The right axilla demonstrates no significant right axillary adenopathy.        Left MG- there are heterogeneous, punctate, and amorphous calcifications in a diffuse distribution throughout the left breast. The most concentrated groupings of calcifications are located in the upper inner and upper outer quadrants of the left breast middle/posterior depth. The asymmetry with possible associated architectural distortion seen in the medial left breast effaced into normal-appearing fibroglandular tissue on spot compression tomosynthesis images.        Left US- there are two morphologically  similar parallel oval avascular hypoechoic masses with circumscribed margins  measuring 0.9 cm x 0.2 cm x 0.5 cm; at 4 o'clock 5 cm FN and 1.0 cm x 0.3 cm x 0.8 cm; at 4 o'clock 3 cm from the nipple.  These masses are without significant change mammographically dating back at least to the 2021 exam, and are also likely without significant change dating back to the 2017 exam allowing for differences in technique and interval reduction mammoplasty.  No suspicious sonographic abnormality is evident in the left breast.. Left axilla demonstrates no significant left axillary adenopathy. BIRADS-4 suspicious;biopsy recommended     3. 2/8/2024 BL Breast MRI -   MRI Breast w/wo Contrast, w/CAD, Bilateral 02/08/2024  IMPRESSION: KNOWN BIOPSY PROVEN MALIGNANCY  1) Right breast upper outer quadrant, lower outer quadrant, and central region posterior to middle depths irregular heterogeneously enhancing spiculated mass with regional clumped non mass enhancement extending away superiorly, inferiorly, and anteriorly measures 5.7 x 2.6 x 7.2 cm ([id number]) in total extent and correlates with biopsy-proven malignancy.  BI-RADS 6: Known biopsy-proven malignancy.    2) No abnormal enhancement associated with the recent benign stereotactic guided core needle biopsy sites for calcifications in both breasts (right breast 3:00 axis middle depth and left breast upper inner quadrant middle depth).  There are multiple hematomas in the right breast upper inner and lower inner quadrants in the anterior depth related to the 3:00 axis middle depth core needle biopsy.  BI-RADS 2: Benign.    3) No MRI evidence of malignancy in the left breast.      Pathology:  1/25/2024 Ultrasound-guided Core Needle Biopsy Right Breast Mass 10 o'clock 4 cm FN-        - Invasive mammary carcinoma with mixed ductal and lobular features, grade 2 (measuring 9 mm) with associated calcifications          ER 86%          TX 80%          HER2 aden 1+          Ki67 23%    2.  2024 Stereotactic guided Core Needle Biopsy Right Breast 3 o'clock Calcifications-       -Stromal fibrosis, cystic dilation of ducts, sclerosing adenosis and microcalcifications         -No evidence of malignancy    3. 2024 Stereotactic guided Core Needle Biopsy Left Breast UIQ Middle Depth Calcifications       - Stromal fibrosis, cystic dilation of ducts, apocrine metaplasia, sclerosing adenosis, fibroadenomatoid change, microcalcifications, and mild ductal epithelial hyperplasia of the usual type        -No evidence of malignancy    4. 3/15/2024 Bilateral Mastectomy with Right [place] Lymph Node Biopsy which revealed-   Right Breast- multifocal invasive carcinoma with mixed ductal and lobular features (measuring 26 mm, 3.0 mm); fibroadenoma; calcifications within invasive carcinoma within benign lobules; perineural invasion by carcinoma. All margins negative for invasive carcinoma.  Eleven sentinel lymph nodes all negative except 2 with isolated tumor cells.    Left Breast- benign breast tissue with fibrocystic changes; calcifications within benign lobules        OB/GYN History:  Age at Menarche Onset: 10  Menopausal Status: postmenopausal, LMP: No LMP recorded. Patient has had a hysterectomy.  Hysterectomy/Oophorectomy: hysterectomy without BSO, at age 40  Hormonal birth control (duration): Yes OCP (estrogen/progesterone).   Pregnancy History:   Age at first live birth: 18  Hormone Replacement Therapy: Yes, Oral Estrogen. Started age 44, discontinued once diagnosed with breast cancer  Patient did not breast feed.  Patient admits to to previous breast biopsy- Right Breast 2017 Benign    Other Relevant History:  Prior thoracic RT: none  Genetic testing: No  Ashkenazi Islam descent: No    Family History:  Family History   Problem Relation Name Age of Onset    Diabetes type I Mother Paul Hamm     Multiple myeloma Mother Paul Hamm 55    Diabetes Mother Paul Hamm     Hypertension Mother  Paul Coughlinandrewvalente     Kidney disease Mother Paul Hamm     Cancer Mother Paul Hamm     Alzheimer's disease Father              No Known Problems Sister      No Known Problems Brother      Pancreatic cancer Maternal Grandmother  70    Breast cancer Paternal Grandmother Claudette Hamm 55    Liver cancer Paternal Grandmother Claudette Hamm 55    Breast cancer Paternal Aunt Lesley Fulton 62        Past History:  Past Medical History:   Diagnosis Date    Abnormal uterine bleeding 2006    Anemia     Anxiety     Cancer     right breast    Depression 1992    Diabetes mellitus     Endometriosis of uterus     Fibroid     Heart attack     Hyperlipidemia     Hypertension 10/04/2022    Menopause 2017    Obesity, unspecified     BMI 32.10    Pregnancy 1987    Miscarriage    Sleep apnea     no cpap        Past Surgical History:   Procedure Laterality Date    ABDOMINAL SURGERY      ADENOIDECTOMY      APPENDECTOMY      BREAST REVISION SURGERY Bilateral 2025    Procedure: BREAST REVISION SURGERY (Noe breast flap debulking and excision of fat necrosis, bilateral axillary liposuction and removal of redundant skin and subcutaneous tissue);  Surgeon: Ubaldo David MD;  Location: [facility] OR;  Service: Plastics;  Laterality: Bilateral;    BREAST SURGERY Bilateral     breast reduction    CERVICAL BIOPSY  W/ LOOP ELECTRODE EXCISION      CERVIX LESION DESTRUCTION       SECTION      COSMETIC SURGERY      Breast reduction    DILATION AND CURETTAGE OF UTERUS      ENDOMETRIAL ABLATION      gastric sleeve      HYSTERECTOMY      HYSTEROSCOPY      INSERTION OF BREAST TISSUE EXPANDER Bilateral 03/15/2024    Procedure: INSERTION, TISSUE EXPANDER, BREAST (bilateral breast recon with placement of tissue expanders and acellular dermal matrix, use of intraoperative fluoroscein angiography);  Surgeon: Ubaldo David MD;  Location: [facility] OR;  Service: Plastics;  Laterality: Bilateral;  300mL filled for each  tissue expander    JOINT REPLACEMENT  2020/2022    bilateral knees    LIPOSUCTION Bilateral 01/16/2025    Procedure: LIPOSUCTION (Noe breast flap debulking and excision of fat necrosis, bilateral axillary liposuction and removal of redundant skin and subcutaneous tissue;  Surgeon: Ubaldo David MD;  Location: [facility];  Service: Plastics;  Laterality: Bilateral;    LUMBAR LAMINECTOMY N/A 05/29/2023    Procedure: LAMINECTOMY, SPINE, LUMBAR L4 SNS:SSEP/EMG;  Surgeon: Kamlesh Gomez MD;  Location: [facility] OR;  Service: Orthopedics;  Laterality: N/A;    LUMBAR LAMINECTOMY N/A 08/22/2023    Procedure: LAMINECTOMY, SPINE, LUMBAR L4;  Surgeon: Kamlesh Gomez MD;  Location: [address];  Service: Neurosurgery;  Laterality: N/A;    RECONSTRUCTION OF BREAST WITH DEEP INFERIOR EPIGASTRIC ARTERY  (KENNETH) FLAP Bilateral 07/23/2024    Procedure: RECONSTRUCTION, BREAST, USING KENNETH SKIN FLAP (Bilateral breast recon with KENNETH flaps, possible TRAM flaps, possible ventral hernia with mesh, possible rectus muscle plication, use of intraoperative fluoroscein angiography);  Surgeon: Ubaldo David MD;  Location: [facility] OR;  Service: Plastics;  Laterality: Bilateral;    SENTINEL LYMPH NODE BIOPSY Right 03/15/2024    Procedure: BIOPSY, LYMPH NODE, SENTINEL - RIGHT MagTrace Injected in office SentiMag needed No NUC MED;  Surgeon: Christina Romero MD;  Location: [facility] OR;  Service: General;  Laterality: Right;  MagTrace Injected in office  SentiMag needed  No NUC MED    SIMPLE MASTECTOMY Bilateral 03/15/2024    Procedure: MASTECTOMY, SIMPLE - BILATERAL;  Surgeon: Christina Romero MD;  Location: [facility] OR;  Service: General;  Laterality: Bilateral;    SPINE SURGERY  2023    TISSUE EXPANDER REMOVAL Bilateral 07/23/2024    Procedure: REMOVAL, TISSUE EXPANDER;  Surgeon: Ubaldo David MD;  Location: [facility];  Service: Plastics;  Laterality: Bilateral;    TONSILLECTOMY      TOTAL KNEE ARTHROPLASTY Bilateral     TUBAL LIGATION   1992    WISDOM TOOTH EXTRACTION          Social History     Socioeconomic History    Marital status: Single   Tobacco Use    Smoking status: Former     Average packs/day: 0.5 packs/day for 5.0 years (2.5 ttl pk-yrs)     Types: Cigarettes     Start date: 2018     Quit date: 2018     Years since quittin.5    Smokeless tobacco: Never    Tobacco comments:     1 pk every 3 days    Substance and Sexual Activity    Alcohol use: Not Currently     Alcohol/week: 1.0 standard drink of alcohol     Types: 1 Glasses of wine per week    Drug use: Never    Sexual activity: Not Currently     Partners: Male     Birth control/protection: Abstinence     Social Drivers of Health     Financial Resource Strain: Low Risk  (2025)    [facility] Its Subsidiaries and Affiliates    Overall Financial Resource Strain (CARDIA)     Difficulty of Paying Living Expenses: Not hard at all   Food Insecurity: No Food Insecurity (2025)    Received from [facility] and Its Subsidiaries and Affiliates    Hunger Vital Sign     Worried About Running Out of Food in the Last Year: Never true     Ran Out of Food in the Last Year: Never true   Transportation Needs: No Transportation Needs (2025)    Received from [facility] Its Subsidiaries and Affiliates    PRAPARE - Transportation     Lack of Transportation (Medical): No     Lack of Transportation (Non-Medical): No   Physical Activity: Sufficiently Active (2025)    Received from [facility] Its Subsidiaries and Affiliates    Exercise Vital Sign     Days of Exercise per Week: 6 days     Minutes of Exercise per Session: 30 min   Stress: Stress Concern Present (2025)    Received from [facility] Its Subsidiaries and Affiliates    [facility]  Occupational Health - Occupational Stress Questionnaire     Feeling of Stress : To some extent   Housing Stability: Low Risk  (6/19/2025)    Received from [facility] and Its Subsidiaries and Affiliates    Housing Stability Vital Sign     Unable to Pay for Housing in the Last Year: No     Number of Times Moved in the Last Year: 0     Homeless in the Last Year: No        Body mass index is 29.49 kg/m².     Allergy/Medications:   Review of patient's allergies indicates:   Allergen Reactions    Latex, natural rubber Hives, Anxiety, Itching, Palpitations and Swelling     Other Reaction(s): Unknown    Tomato (solanum lycopersicum) Hives and Swelling          Current Outpatient Medications:     aspirin (ECOTRIN) 81 MG EC tablet, Take 81 mg by mouth once daily., Disp: , Rfl:     azelastine (ASTELIN) 137 mcg (0.1 %) nasal spray, SMARTSIG:Both Nares, Disp: , Rfl:     blood-glucose meter kit, Check sugars daily. For T2DM without insulin (E11.9), Disp: , Rfl:     busPIRone (BUSPAR) 30 MG Tab, Take 30 mg by mouth 2 (two) times a day., Disp: , Rfl:     carvediloL (COREG) 3.125 MG tablet, Take 3.125 mg by mouth 2 (two) times daily., Disp: , Rfl:     cetirizine (ZYRTEC) 10 MG tablet, 1 tablet Orally Once a day, Disp: , Rfl:     FEROSUL 325 mg (65 mg iron) Tab tablet, Take by mouth every other day., Disp: , Rfl:     fexofenadine (ALLEGRA) 180 MG tablet, Take 1 tablet by mouth every morning., Disp: , Rfl:     ipratropium (ATROVENT) 42 mcg (0.06 %) nasal spray, by Each Nostril route., Disp: , Rfl:     lamoTRIgine (LAMICTAL) 150 MG Tab, TAKE ONE TABLET BY MOUTH AT BEDTIME SEIZURES FOR MOOD STABILIZATION, Disp: , Rfl:     letrozole (FEMARA) 2.5 mg Tab, TAKE 1 TABLET(2.5 MG) BY MOUTH DAILY, Disp: 90 tablet, Rfl: 0    metFORMIN (GLUCOPHAGE) 500 MG tablet, Take 500 mg by mouth 2 (two) times daily with meals., Disp: , Rfl:     montelukast (SINGULAIR) 10 mg tablet, Take 10 mg by mouth once daily., Disp:  ", Rfl:     rosuvastatin (CRESTOR) 20 MG tablet, Take 20 mg by mouth once daily., Disp: , Rfl:     sertraline (ZOLOFT) 100 MG tablet, Take 2 tablets by mouth once daily., Disp: , Rfl:     SODIUM FLUORIDE 5000 DRY MOUTH 1.1 % Pste, , Disp: , Rfl:     sumatriptan (IMITREX) 100 MG tablet, Take 100 mg by mouth., Disp: , Rfl:     traZODone (DESYREL) 150 MG tablet, Take 1 tablet by mouth every evening., Disp: , Rfl:     vitamin D (VITAMIN D3) 1000 units Tab, Take 1,000 Units by mouth once daily., Disp: , Rfl:     diazePAM (VALIUM) 5 MG tablet, Take 5 mg by mouth. (Patient not taking: Reported on 7/8/2025), Disp: , Rfl:     senna (SENOKOT) 8.6 mg tablet, Take 2 tablets by mouth once daily. (Patient not taking: Reported on 7/8/2025), Disp: , Rfl:        Review of Systems:  Review of Systems   Constitutional:  Negative for chills, fever and weight loss.   HENT:  Negative for sore throat.    Eyes:  Negative for blurred vision and double vision.   Respiratory:  Negative for cough and shortness of breath.    Cardiovascular:  Negative for chest pain, palpitations and leg swelling.   Gastrointestinal:  Positive for constipation. Negative for abdominal pain, diarrhea, heartburn, nausea and vomiting.        Takes meds 1 / week   Genitourinary:  Negative for dysuria, flank pain, frequency, hematuria and urgency.   Musculoskeletal:  Negative for back pain, joint pain and myalgias.   Neurological:  Positive for headaches. Negative for dizziness.        Been having for years   Endo/Heme/Allergies:  Does not bruise/bleed easily.   Psychiatric/Behavioral:  Negative for depression. The patient is not nervous/anxious.            Objective:     Vitals:  Blood pressure 122/81, pulse 68, temperature 98.1 °F (36.7 °C), temperature source Oral, resp. rate 18, height 5' 4" (1.626 m), weight 77.9 kg (171 lb 12.8 oz), SpO2 100%.      Physical Exam:  General: The patient is awake, alert and oriented times three. The patient is well nourished and in " no acute distress.  Neck: There is no evidence of palpable cervical, supraclavicular or axillary adenopathy. The neck is supple. The thyroid is not enlarged.  Musculoskeletal: The patient has a normal range of motion of her bilateral upper extremities.  Chest: Examination of the chest wall fails to reveal any obvious abnormalities. Nonlabored breathing, symmetric expansion.  Breast:  Right:   Examination of right reconstructed breast fails reveals a 3-4 cm well circumscribed, freely-mobile lump in the 9:00 axis most consistent with fat necrosis. The nipple is surgically absent. There is no skin dimpling with movement of the pectoralis. There are no significant skin changes overlying the breast.   Left:  Examination of the left reconstructed breast reveals hardness/a large dominant mass to the entire left breast consistent with post op fat necrosis. This is especially felt in the 3:00 axis. The nipple is surgically absent. There is no skin dimpling with movement of the pectoralis. There are no significant skin changes overlying the breast.  Abdomen: The abdomen is soft, flat, nontender and nondistended.  Integumentary: no rashes or skin lesions present  Neurologic: cranial nerves intact, no signs of peripheral neurological deficit, motor/sensory function intact    Assessment and Plan:     Encounter Diagnoses   Name Primary?    Malignant neoplasm of upper-outer quadrant of right breast in female, estrogen receptor positive Yes    History of bilateral mastectomy     History of breast reconstruction     Fat necrosis (segmental) of breast            The following information is adapted from [email or web address].org.    What is fat necrosis?   Fat necrosis is a benign (not cancer) condition that most commonly develops after an injury or trauma to the breast tissue.   A lump can form if an area of fatty breast tissue is damaged, for example during a biopsy or surgery.   Necrosis is a medical term that describes damaged or dead  tissue.   It can occur anywhere in the breast and can affect women of any age.   Men can also get fat necrosis, but this is very rare.   Having fat necrosis does not increase your risk of developing breast cancer.     What causes fat necrosis?   Fat necrosis can be caused by any type of trauma to the breast including:    Breast biopsy    Breast surgery including lumpectomy, breast reduction, reconstruction or enlargement    Radiotherapy to the breast    Lipomodelling: when fat taken from another part of the body is injected into the breast, for example to improve the appearance of dents following surgery    Damage or injury caused by an accident such as a seat belt injury or fall     Fat necrosis can be found at any time after breast surgery or an injury. Sometimes it develops without any trauma and many women with fat necrosis do not remember a specific injury.     What are the symptoms?   Fat necrosis feels like a firm, round lump or lumps.   Its usually painless, but in some people it may feel tender or even painful.   The skin around the lump may look thickened, red, bruised or occasionally dimpled.   Sometimes fat necrosis can cause the nipple to be pulled in.     How is fat necrosis diagnosed?   Fat necrosis usually becomes noticeable as a lump in the breast.   Sometimes its found by chance after a mammogram (breast x-ray) during breast screening.   If you see your GP because of a breast lump, theyll examine your breasts and will likely refer you to a breast clinic where youll be seen by specialist doctors or nurses.     At the breast clinic most people have a breast examination followed by one or more of the following tests:    A mammogram    An ultrasound scan (using sound waves to produce an image)    A core biopsy (using a hollow needle to take a small sample of tissue to be looked at under a microscope - several tissue samples may be taken at the same time)    A fine needle aspiration (using a fine  needle and syringe to take a sample of cells to be looked at under a microscope)     During a breast examination fat necrosis can be confused with breast cancer. It may also look like breast cancer on x-rays and scans, which can cause a lot of anxiety. If the mammogram or ultrasound scan clearly shows fat necrosis, you may not need more tests. If theres any doubt about what the mammogram or ultrasound scan shows, you will have a biopsy.   Your breast clinic appointment for more information about any tests you may be having.     How is fat necrosis treated?   Most people do not need treatment   Fat necrosis is harmless so you will not usually need any treatment or follow-up.   In most cases the body will break it down over time. This could take a few months.   Its important to go back to your GP or breast specialist if the lump gets bigger or you notice any other changes to your breasts.     Surgery is usually avoided if possible because it can sometimes cause further fat necrosis.   However, an operation to remove the fat necrosis may be recommended if:    The biopsy has not given enough information to confirm a diagnosis of fat necrosis    The fat necrosis is uncomfortable or tender    The lump or lumpy area does not go away by itself or gets bigger     If fat necrosis causes discomfort but youre not having an operation to remove it, your doctor may suggest treating it with pain relief such as paracetamol or an anti-inflammatory drug like ibuprofen.   Sometimes, within an area of fat necrosis the damaged tissue can form a cyst containing an oily fluid (oil cyst). Breast cysts dont usually need any treatment or follow-up. Most cysts go away by themselves and are nothing to worry about.   If the cyst is large or causing discomfort, your specialist may draw off the fluid using a fine needle and syringe.    Can fat necrosis increase the risk of breast cancer?   Having fat necrosis does not increase your risk of  developing breast cancer.   Some people worry the fat necrosis might turn into breast cancer, but theres no evidence that this can happen.   However, its still important to be breast aware and go back to your GP if you notice any changes in your breasts, regardless of how soon these occur after your diagnosis of fat necrosis.       Plan:       RTC in 6 months for CBE/surveillance.    Plan to return for reconstruction surgery again with Dr. David for fat grafting.     BLM mutation - She was referred back to GI but [facility] She sees her doctor soon and will request referral.    Continue adjuvant therapy and follow up with oncology.        All of her questions were answered. She was advised to call if she develops any questions or concerns.    Sayda Lozada PA-C      Total time on the date of the visit ranged from 30-39 mins (86320). Total time includes both face-to-face and non-face-to-face time personally spent by myself on the day of the visit.    Non-face-to-face time included:  _X_ preparing to see the patient such as reviewing the patient record  __ obtaining and reviewing separately obtained history  _X_ independently interpreting results  _X_ documenting clinical information in electronic health record.    Face-to-face time included:  _X_ performing an appropriate history and examination  _X_ communicating results to the patient  _X_ counseling and educating the patient  __ ordering appropriate medications  __ ordering appropriate tests  _X_ ordering appropriate procedures (including follow-up)  _X_ answering any questions the patient had    Total Time spent on date of visit: 31 minutes

## 2025-07-08 ENCOUNTER — OFFICE VISIT (OUTPATIENT)
Dept: SURGERY | Facility: CLINIC | Age: 55
End: 2025-07-08
Payer: OTHER GOVERNMENT

## 2025-07-08 VITALS
HEIGHT: 64 IN | BODY MASS INDEX: 29.33 KG/M2 | WEIGHT: 171.81 LBS | HEART RATE: 68 BPM | TEMPERATURE: 98 F | DIASTOLIC BLOOD PRESSURE: 81 MMHG | SYSTOLIC BLOOD PRESSURE: 122 MMHG | RESPIRATION RATE: 18 BRPM | OXYGEN SATURATION: 100 %

## 2025-07-08 DIAGNOSIS — Z17.0 MALIGNANT NEOPLASM OF UPPER-OUTER QUADRANT OF RIGHT BREAST IN FEMALE, ESTROGEN RECEPTOR POSITIVE: Primary | ICD-10-CM

## 2025-07-08 DIAGNOSIS — Z98.890 HISTORY OF BREAST RECONSTRUCTION: ICD-10-CM

## 2025-07-08 DIAGNOSIS — C50.411 MALIGNANT NEOPLASM OF UPPER-OUTER QUADRANT OF RIGHT BREAST IN FEMALE, ESTROGEN RECEPTOR POSITIVE: Primary | ICD-10-CM

## 2025-07-08 DIAGNOSIS — N64.1 FAT NECROSIS (SEGMENTAL) OF BREAST: ICD-10-CM

## 2025-07-08 DIAGNOSIS — Z90.13 HISTORY OF BILATERAL MASTECTOMY: ICD-10-CM

## 2025-07-08 PROCEDURE — 99215 OFFICE O/P EST HI 40 MIN: CPT | Mod: PBBFAC | Performed by: PHYSICIAN ASSISTANT

## 2025-07-08 PROCEDURE — 99214 OFFICE O/P EST MOD 30 MIN: CPT | Mod: S$PBB,,, | Performed by: PHYSICIAN ASSISTANT

## 2025-07-08 PROCEDURE — 99999 PR PBB SHADOW E&M-EST. PATIENT-LVL V: CPT | Mod: PBBFAC,,, | Performed by: PHYSICIAN ASSISTANT

## 2025-08-01 ENCOUNTER — LAB VISIT (OUTPATIENT)
Dept: LAB | Facility: HOSPITAL | Age: 55
End: 2025-08-01
Attending: INTERNAL MEDICINE
Payer: OTHER GOVERNMENT

## 2025-08-01 DIAGNOSIS — Z17.0 MALIGNANT NEOPLASM OF UPPER-OUTER QUADRANT OF RIGHT BREAST IN FEMALE, ESTROGEN RECEPTOR POSITIVE: ICD-10-CM

## 2025-08-01 DIAGNOSIS — D50.8 IRON DEFICIENCY ANEMIA SECONDARY TO INADEQUATE DIETARY IRON INTAKE: ICD-10-CM

## 2025-08-01 DIAGNOSIS — C50.411 MALIGNANT NEOPLASM OF UPPER-OUTER QUADRANT OF RIGHT BREAST IN FEMALE, ESTROGEN RECEPTOR POSITIVE: ICD-10-CM

## 2025-08-01 LAB
ALBUMIN SERPL-MCNC: 3.6 G/DL (ref 3.5–5)
ALBUMIN/GLOB SERPL: 0.9 RATIO (ref 1.1–2)
ALP SERPL-CCNC: 121 UNIT/L (ref 40–150)
ALT SERPL-CCNC: 12 UNIT/L (ref 0–55)
ANION GAP SERPL CALC-SCNC: 7 MEQ/L
AST SERPL-CCNC: 18 UNIT/L (ref 11–45)
BASOPHILS # BLD AUTO: 0.03 X10(3)/MCL
BASOPHILS NFR BLD AUTO: 0.4 %
BILIRUB SERPL-MCNC: 0.4 MG/DL
BUN SERPL-MCNC: 8.3 MG/DL (ref 9.8–20.1)
CALCIUM SERPL-MCNC: 9 MG/DL (ref 8.4–10.2)
CHLORIDE SERPL-SCNC: 106 MMOL/L (ref 98–107)
CO2 SERPL-SCNC: 27 MMOL/L (ref 22–29)
CREAT SERPL-MCNC: 0.72 MG/DL (ref 0.55–1.02)
CREAT/UREA NIT SERPL: 12
EOSINOPHIL # BLD AUTO: 0.04 X10(3)/MCL (ref 0–0.9)
EOSINOPHIL NFR BLD AUTO: 0.6 %
ERYTHROCYTE [DISTWIDTH] IN BLOOD BY AUTOMATED COUNT: 14.1 % (ref 11.5–17)
FERRITIN SERPL-MCNC: 56.81 NG/ML (ref 4.63–204)
GFR SERPLBLD CREATININE-BSD FMLA CKD-EPI: >60 ML/MIN/1.73/M2
GLOBULIN SER-MCNC: 4.2 GM/DL (ref 2.4–3.5)
GLUCOSE SERPL-MCNC: 120 MG/DL (ref 74–100)
HCT VFR BLD AUTO: 39.2 % (ref 37–47)
HGB BLD-MCNC: 13.2 G/DL (ref 12–16)
IMM GRANULOCYTES # BLD AUTO: 0.01 X10(3)/MCL (ref 0–0.04)
IMM GRANULOCYTES NFR BLD AUTO: 0.1 %
IRON SATN MFR SERPL: 38 % (ref 20–50)
IRON SERPL-MCNC: 108 UG/DL (ref 50–170)
LYMPHOCYTES # BLD AUTO: 2.48 X10(3)/MCL (ref 0.6–4.6)
LYMPHOCYTES NFR BLD AUTO: 34.4 %
MCH RBC QN AUTO: 30.3 PG (ref 27–31)
MCHC RBC AUTO-ENTMCNC: 33.7 G/DL (ref 33–36)
MCV RBC AUTO: 90.1 FL (ref 80–94)
MONOCYTES # BLD AUTO: 0.4 X10(3)/MCL (ref 0.1–1.3)
MONOCYTES NFR BLD AUTO: 5.5 %
NEUTROPHILS # BLD AUTO: 4.25 X10(3)/MCL (ref 2.1–9.2)
NEUTROPHILS NFR BLD AUTO: 59 %
PLATELET # BLD AUTO: 317 X10(3)/MCL (ref 130–400)
PMV BLD AUTO: 9.8 FL (ref 7.4–10.4)
POTASSIUM SERPL-SCNC: 3.5 MMOL/L (ref 3.5–5.1)
PROT SERPL-MCNC: 7.8 GM/DL (ref 6.4–8.3)
RBC # BLD AUTO: 4.35 X10(6)/MCL (ref 4.2–5.4)
SODIUM SERPL-SCNC: 140 MMOL/L (ref 136–145)
TIBC SERPL-MCNC: 174 UG/DL (ref 70–310)
TIBC SERPL-MCNC: 282 UG/DL (ref 250–450)
TRANSFERRIN SERPL-MCNC: 248 MG/DL (ref 180–382)
WBC # BLD AUTO: 7.21 X10(3)/MCL (ref 4.5–11.5)

## 2025-08-01 PROCEDURE — 36415 COLL VENOUS BLD VENIPUNCTURE: CPT

## 2025-08-01 PROCEDURE — 85025 COMPLETE CBC W/AUTO DIFF WBC: CPT

## 2025-08-01 PROCEDURE — 83540 ASSAY OF IRON: CPT

## 2025-08-01 PROCEDURE — 80053 COMPREHEN METABOLIC PANEL: CPT

## 2025-08-01 PROCEDURE — 82728 ASSAY OF FERRITIN: CPT

## 2025-08-05 ENCOUNTER — OFFICE VISIT (OUTPATIENT)
Dept: HEMATOLOGY/ONCOLOGY | Facility: CLINIC | Age: 55
End: 2025-08-05
Payer: OTHER GOVERNMENT

## 2025-08-05 VITALS
RESPIRATION RATE: 18 BRPM | DIASTOLIC BLOOD PRESSURE: 89 MMHG | HEART RATE: 62 BPM | OXYGEN SATURATION: 98 % | BODY MASS INDEX: 29.13 KG/M2 | TEMPERATURE: 99 F | SYSTOLIC BLOOD PRESSURE: 145 MMHG | HEIGHT: 64 IN | WEIGHT: 170.63 LBS

## 2025-08-05 DIAGNOSIS — D50.8 IRON DEFICIENCY ANEMIA SECONDARY TO INADEQUATE DIETARY IRON INTAKE: ICD-10-CM

## 2025-08-05 DIAGNOSIS — C50.411 MALIGNANT NEOPLASM OF UPPER-OUTER QUADRANT OF RIGHT BREAST IN FEMALE, ESTROGEN RECEPTOR POSITIVE: Primary | ICD-10-CM

## 2025-08-05 DIAGNOSIS — Z17.0 MALIGNANT NEOPLASM OF UPPER-OUTER QUADRANT OF RIGHT BREAST IN FEMALE, ESTROGEN RECEPTOR POSITIVE: Primary | ICD-10-CM

## 2025-08-05 PROCEDURE — 99999 PR PBB SHADOW E&M-EST. PATIENT-LVL V: CPT | Mod: PBBFAC,,, | Performed by: NURSE PRACTITIONER

## 2025-08-05 PROCEDURE — G2211 COMPLEX E/M VISIT ADD ON: HCPCS | Mod: ,,, | Performed by: NURSE PRACTITIONER

## 2025-08-05 PROCEDURE — 99215 OFFICE O/P EST HI 40 MIN: CPT | Mod: PBBFAC | Performed by: NURSE PRACTITIONER

## 2025-08-05 PROCEDURE — 99214 OFFICE O/P EST MOD 30 MIN: CPT | Mod: S$PBB,,, | Performed by: NURSE PRACTITIONER

## 2025-08-05 NOTE — PROGRESS NOTES
Subjective:       Patient ID: Brenden Mccauley is a 55 y.o. female.    Surgeon: Dr. Christina Romero    1. Right Breast Cancer Stage IA (T2(m2) N0(I+)M0)--Diagnosed 24    2. Iron Deficiency Anemia h/o Gastric sleeve in     Biopsy/pathology: Right and left breast biopsies done 24--10:00 4CMFN mass with invasive mammary carcinoma with mixed ductal and lobular features, Grade 2, at least 9mm, associated calcifications, ER 86%, NY 80%, Her2 1+ negative/low, Ki67 23%, 3:00 middle depth calcifications with stromal fibrosis, sclerosing adenosis; Left breast UIQ middle depth calcifications with stromal fibrosis, apocrine metaplasia, sclerosing adenosis, fibroadenomatoid change.  Surgery/pathology:  Bilateral mastectomies done 3/15/24--Right breast with multifocal invasive carcinoma with mixed ductal and lobular features 26mm and 3mm, +PNI, 2/11 lymph nodes with isolated tumor cells; left breast mastectomy with benign breast tissue, fibrocystic changes, site of prior biopsy. Oncotype DX testing with score 14, risk of recurrence with AI alone 4%, no chemotherapy benefit.   Imagin. Screening MMG done 23--on right irregular mass with associated calcifications at 9 o'clock posterior depth.  There are calcifications in the right breast lower inner quadrant, middle depth. There is an asymmetry in the medial right breast, anterior depth; on left calcifications noted at 3 o'clock, posterior depth, in the medial left breast, anterior to middle depth.  There is an asymmetry with possible associated architectural distortion in the medial left breast, middle depth. BIRADS-0; additional imaging needed. 2. Bilateral diagnostic MMG/US done 1/3/24--right MMG persistent 1.4cm irregular mass with spiculated margins and associated architectural distortion at 10:00, diffuse calcifications throughout right breast, on US 1.4cm irregular hypoechoic mass, right axilla with no adenopathy; left MMG with diffuse  calcifications left breast most concentrated in UI and UO quadrants, assymetry in left breast effaced out, left US with two morphologically similar parallel oval avascular hypoechoic masses with circumscribed margins  measuring 0.9 cm x 0.2 cm x 0.5 cm; at 4 o'clock 5 cm FN and 1.0 cm x 0.3 cm x 0.8 cm; at 4 o'clock 3 cm from the nipple, masses are without significant change mammographically dating back at least to the 2021 exam, and are also likely without significant change dating back to the 2017 exam allowing for differences in technique and interval reduction mammoplast. No suspicious sonographic abnormality is evident in the left breast.. Left axilla demonstrates no significant left axillary adenopathy. BIRADS-4 suspicious; biopsy recommended.  3. Breast MRI 2/8/24-- Right breast upper outer quadrant, lower outer quadrant, and central region posterior to middle depths irregular heterogeneously enhancing spiculated mass with regional clumped non mass enhancement extending away superiorly, inferiorly, and anteriorly measures 5.7 x 2.6 x 7.2 cm ([id number]) in total extent and correlates with biopsy-proven malignancy.  BI-RADS 6: Known biopsy-proven malignancy. No abnormal enhancement associated with the recent benign stereotactic guided core needle biopsy sites for calcifications in both breasts (right breast 3:00 axis middle depth and left breast upper inner quadrant middle depth).  There are multiple hematomas in the right breast upper inner and lower inner quadrants in the anterior depth related to the 3:00 axis middle depth core needle biopsy.  BI-RADS 2: Benign. No MRI evidence of malignancy in the left breast.    [employer] BRCA1 and BRCA2 panel, and 84 individual genes with +heterozygous for BLM mutation, pathogenic variant (predisposition to colorectal cancer), POLE heterozygous mutation VUS.     DEXA:  06/17/24--AP spine 0.6, left femoral neck -0.6, left total -0.2, right femoral neck -0.4, right total -0.1.  Normal bone density.     Treatment history:  Bilateral mastectomies with right SLN biopsies with tissue expander reconstruction done 3/15/24.   InFeD 1500mg x 1 dose on 24.     Current treatment plan:   Adjuvant Femara started on 24.     Chief Complaint: 3 Month Follow Up (Patient states she been very tired lately. )    HPI  Patient presents for follow-up of breast cancer. She is doing well. Currently on Femara since 2024. She recently had her gall bladder removed so her final surgery with Dr. David was postponed.     Past Medical History:   Diagnosis Date    Abnormal uterine bleeding 2006    Anemia     Anxiety     Cancer     right breast    Depression     Diabetes mellitus     Endometriosis of uterus     Fibroid     Heart attack     Hyperlipidemia     Hypertension 10/04/2022    Menopause 2017    Obesity, unspecified     BMI 32.10    Pregnancy     Miscarriage    Sleep apnea     no cpap      Past Surgical History:   Procedure Laterality Date    ABDOMINAL SURGERY      ADENOIDECTOMY  2011    APPENDECTOMY      BREAST REVISION SURGERY Bilateral 2025    Procedure: BREAST REVISION SURGERY (Noe breast flap debulking and excision of fat necrosis, bilateral axillary liposuction and removal of redundant skin and subcutaneous tissue);  Surgeon: Ubaldo David MD;  Location: [facility];  Service: Plastics;  Laterality: Bilateral;    BREAST SURGERY Bilateral     breast reduction    CERVICAL BIOPSY  W/ LOOP ELECTRODE EXCISION      CERVIX LESION DESTRUCTION       SECTION      COSMETIC SURGERY      Breast reduction    DILATION AND CURETTAGE OF UTERUS      ENDOMETRIAL ABLATION      gastric sleeve      HYSTERECTOMY      HYSTEROSCOPY      INSERTION OF BREAST TISSUE EXPANDER Bilateral 03/15/2024    Procedure: INSERTION, TISSUE EXPANDER, BREAST (bilateral breast recon with placement of tissue expanders and acellular dermal matrix, use of intraoperative fluoroscein angiography);   Surgeon: Ubaldo David MD;  Location: [facility] OR;  Service: Plastics;  Laterality: Bilateral;  300mL filled for each tissue expander    JOINT REPLACEMENT  2020/2022    bilateral knees    LIPOSUCTION Bilateral 01/16/2025    Procedure: LIPOSUCTION (Noe breast flap debulking and excision of fat necrosis, bilateral axillary liposuction and removal of redundant skin and subcutaneous tissue;  Surgeon: Ubaldo David MD;  Location: [facility] OR;  Service: Plastics;  Laterality: Bilateral;    LUMBAR LAMINECTOMY N/A 05/29/2023    Procedure: LAMINECTOMY, SPINE, LUMBAR L4 SNS:SSEP/EMG;  Surgeon: Kamlesh Gomez MD;  Location: [facility] OR;  Service: Orthopedics;  Laterality: N/A;    LUMBAR LAMINECTOMY N/A 08/22/2023    Procedure: LAMINECTOMY, SPINE, LUMBAR L4;  Surgeon: Kamlesh Gomez MD;  Location: [facility] OR [address] [place] [address];  Service: Neurosurgery;  Laterality: N/A;    RECONSTRUCTION OF BREAST WITH DEEP INFERIOR EPIGASTRIC ARTERY  (KENNETH) FLAP Bilateral 07/23/2024    Procedure: RECONSTRUCTION, BREAST, USING KENNETH SKIN FLAP (Bilateral breast recon with KENNETH flaps, possible TRAM flaps, possible ventral hernia with mesh, possible rectus muscle plication, use of intraoperative fluoroscein angiography);  Surgeon: Ubaldo David MD;  Location: [facility];  Service: Plastics;  Laterality: Bilateral;    SENTINEL LYMPH NODE BIOPSY Right 03/15/2024    Procedure: BIOPSY, LYMPH NODE, SENTINEL - RIGHT MagTrace Injected in office SentiMag needed No NUC MED;  Surgeon: Christina Romero MD;  Location: [facility] OR;  Service: General;  Laterality: Right;  MagTrace Injected in office  SentiMag needed  No NUC MED    SIMPLE MASTECTOMY Bilateral 03/15/2024    Procedure: MASTECTOMY, SIMPLE - BILATERAL;  Surgeon: Christina Romero MD;  Location: [facility] OR;  Service: General;  Laterality: Bilateral;    SPINE SURGERY  2023    TISSUE EXPANDER REMOVAL Bilateral 07/23/2024    Procedure: REMOVAL, TISSUE EXPANDER;  Surgeon: Ubaldo David MD;  Location: [facility];   Service: Plastics;  Laterality: Bilateral;    TONSILLECTOMY      TOTAL KNEE ARTHROPLASTY Bilateral     TUBAL LIGATION      WISDOM TOOTH EXTRACTION       Family History   Problem Relation Name Age of Onset    Diabetes type I Mother Paul Hamm     Multiple myeloma Mother Paul Hamm 55    Diabetes Mother Paul Hamm     Hypertension Mother Paul Hamm     Kidney disease Mother Paul Hamm     Cancer Mother Paul Hamm     Alzheimer's disease Father              No Known Problems Sister      No Known Problems Brother      Pancreatic cancer Maternal Grandmother  70    Breast cancer Paternal Grandmother Claudette Hamm 55    Liver cancer Paternal Grandmother Claudette Hamm 55    Breast cancer Paternal Aunt Lesley Fulton 62     Social History     Socioeconomic History    Marital status: Single   Tobacco Use    Smoking status: Former     Average packs/day: 0.5 packs/day for 5.0 years (2.5 ttl pk-yrs)     Types: Cigarettes     Start date: 2018     Quit date: 2018     Years since quittin.6    Smokeless tobacco: Never    Tobacco comments:     1 pk every 3 days    Substance and Sexual Activity    Alcohol use: Not Currently     Alcohol/week: 1.0 standard drink of alcohol     Types: 1 Glasses of wine per week    Drug use: Never    Sexual activity: Not Currently     Partners: Male     Birth control/protection: Abstinence     Social Drivers of Health     Financial Resource Strain: Low Risk  (2025)    Received from [facility] Its Subsidiaries and Affiliates    Overall Financial Resource Strain (CARDIA)     Difficulty of Paying Living Expenses: Not hard at all   Food Insecurity: No Food Insecurity (2025)    Received from [facility] Its Subsidiaries and Affiliates    Hunger Vital Sign     Worried About Running Out of Food in the Last Year: Never true     Ran Out of Food in the Last Year: Never  true   Transportation Needs: No Transportation Needs (6/19/2025)    [facility]    PRAPARE - Transportation     Lack of Transportation (Medical): No     Lack of Transportation (Non-Medical): No   Physical Activity: Sufficiently Active (6/19/2025)    [facility]    Exercise Vital Sign     Days of Exercise per Week: 6 days     Minutes of Exercise per Session: 30 min   Stress: Stress Concern Present (6/19/2025)    [facility]    [employer] [place] [employer] - Occupational Stress Questionnaire     Feeling of Stress : To some extent   Housing Stability: Low Risk  (6/19/2025)    [facility]    Housing Stability Vital Sign     Unable to Pay for Housing in the Last Year: No     Number of Times Moved in the Last Year: 0     Homeless in the Last Year: No       Review of patient's allergies indicates:   Allergen Reactions    Latex, natural rubber Hives, Anxiety, Itching, Palpitations and Swelling     Other Reaction(s): Unknown    Tomato (solanum lycopersicum) Hives and Swelling      Current Outpatient Medications on File Prior to Visit   Medication Sig Dispense Refill    aspirin (ECOTRIN) 81 MG EC tablet Take 81 mg by mouth once daily.      azelastine (ASTELIN) 137 mcg (0.1 %) nasal spray SMARTSIG:Both Nares      busPIRone (BUSPAR) 30 MG Tab Take 30 mg by mouth 2 (two) times a day.      carvediloL (COREG) 3.125 MG tablet Take 3.125 mg by mouth 2 (two) times daily.      cetirizine (ZYRTEC) 10 MG tablet 1 tablet Orally Once a day      FEROSUL 325 mg (65 mg iron) Tab tablet Take by mouth every other day.      fexofenadine (ALLEGRA) 180 MG tablet Take 1 tablet by mouth  every morning.      ipratropium (ATROVENT) 42 mcg (0.06 %) nasal spray by Each Nostril route.      lamoTRIgine (LAMICTAL) 150 MG Tab TAKE ONE TABLET BY MOUTH AT BEDTIME SEIZURES FOR MOOD STABILIZATION      letrozole (FEMARA) 2.5 mg Tab TAKE 1 TABLET(2.5 MG) BY MOUTH DAILY 90 tablet 0    metFORMIN (GLUCOPHAGE) 500 MG tablet Take 500 mg by mouth 2 (two) times daily with meals.      montelukast (SINGULAIR) 10 mg tablet Take 10 mg by mouth once daily.      rosuvastatin (CRESTOR) 20 MG tablet Take 20 mg by mouth once daily.      sertraline (ZOLOFT) 100 MG tablet Take 2 tablets by mouth once daily.      SODIUM FLUORIDE 5000 DRY MOUTH 1.1 % Pste       sumatriptan (IMITREX) 100 MG tablet Take 100 mg by mouth.      traZODone (DESYREL) 150 MG tablet Take 1 tablet by mouth every evening.      vitamin D (VITAMIN D3) 1000 units Tab Take 1,000 Units by mouth once daily.      blood-glucose meter kit Check sugars daily. For T2DM without insulin (E11.9)      senna (SENOKOT) 8.6 mg tablet Take 2 tablets by mouth once daily. (Patient not taking: Reported on 8/5/2025)      [DISCONTINUED] diazePAM (VALIUM) 5 MG tablet Take 5 mg by mouth. (Patient not taking: Reported on 5/1/2025)       No current facility-administered medications on file prior to visit.      Review of Systems   Constitutional:  Positive for fatigue. Negative for appetite change and unexpected weight change.   HENT:  Negative for mouth sores.    Eyes:  Negative for visual disturbance.   Respiratory:  Negative for cough and shortness of breath.    Cardiovascular:  Negative for chest pain.   Gastrointestinal:  Positive for diarrhea. Negative for abdominal pain.   Genitourinary:  Negative for frequency.   Musculoskeletal:  Positive for back pain.   Integumentary:  Negative for rash.   Neurological:  Positive for headaches.   Hematological:  Negative for adenopathy.   Psychiatric/Behavioral:  The patient is nervous/anxious.          Vitals:    08/05/25 1356   BP: (!) 145/89  "  Pulse: 62   Resp: 18   Temp: 98.6 °F (37 °C)   TempSrc: Oral   SpO2: 98%   Weight: 77.4 kg (170 lb 9.6 oz)   Height: 5' 4" (1.626 m)       Physical Exam  Constitutional:       Appearance: Normal appearance. She is overweight.   HENT:      Head: Normocephalic.      Nose: Nose normal.      Mouth/Throat:      Mouth: Mucous membranes are moist.   Eyes:      Extraocular Movements: Extraocular movements intact.      Conjunctiva/sclera: Conjunctivae normal.   Cardiovascular:      Rate and Rhythm: Normal rate and regular rhythm.   Pulmonary:      Effort: Pulmonary effort is normal.      Breath sounds: Normal breath sounds.   Chest:      Comments: S/p bilateral mastectomies with breast reconstruction. Right breast with small areas of fat necrosis laterally, along incision line. Left breast with areas of fat necrosis taking up a majority of the breast. No axillary adenopathy.   Abdominal:      General: Bowel sounds are normal. There is no distension.      Palpations: Abdomen is soft.      Tenderness: There is no abdominal tenderness.   Musculoskeletal:         General: Normal range of motion.   Skin:     General: Skin is warm.   Neurological:      General: No focal deficit present.      Mental Status: She is alert and oriented to person, place, and time.   Psychiatric:         Mood and Affect: Mood normal.         Behavior: Behavior is cooperative.         Judgment: Judgment normal.       Lab Visit on 08/01/2025   Component Date Value Ref Range Status    Sodium 08/01/2025 140  136 - 145 mmol/L Final    Potassium 08/01/2025 3.5  3.5 - 5.1 mmol/L Final    Chloride 08/01/2025 106  98 - 107 mmol/L Final    CO2 08/01/2025 27  22 - 29 mmol/L Final    Glucose 08/01/2025 120 (H)  74 - 100 mg/dL Final    Blood Urea Nitrogen 08/01/2025 8.3 (L)  9.8 - 20.1 mg/dL Final    Creatinine 08/01/2025 0.72  0.55 - 1.02 mg/dL Final    Calcium 08/01/2025 9.0  8.4 - 10.2 mg/dL Final    Protein Total 08/01/2025 7.8  6.4 - 8.3 gm/dL Final    " Albumin 08/01/2025 3.6  3.5 - 5.0 g/dL Final    Globulin 08/01/2025 4.2 (H)  2.4 - 3.5 gm/dL Final    Albumin/Globulin Ratio 08/01/2025 0.9 (L)  1.1 - 2.0 ratio Final    Bilirubin Total 08/01/2025 0.4  <=1.5 mg/dL Final    ALP 08/01/2025 121  40 - 150 unit/L Final    ALT 08/01/2025 12  0 - 55 unit/L Final    AST 08/01/2025 18  11 - 45 unit/L Final    eGFR 08/01/2025 >60  mL/min/1.73/m2 Final    Estimated GFR calculated using the CKD-EPI creatinine (2021) equation.    Anion Gap 08/01/2025 7.0  mEq/L Final    BUN/Creatinine Ratio 08/01/2025 12   Final    Iron Binding Capacity Unsaturated 08/01/2025 174  70 - 310 ug/dL Final    Iron Level 08/01/2025 108  50 - 170 ug/dL Final    Transferrin 08/01/2025 248  180 - 382 mg/dL Final    Iron Binding Capacity Total 08/01/2025 282  250 - 450 ug/dL Final    Iron Saturation 08/01/2025 38  20 - 50 % Final    Ferritin Level 08/01/2025 56.81  4.63 - 204.00 ng/mL Final    WBC 08/01/2025 7.21  4.50 - 11.50 x10(3)/mcL Final    RBC 08/01/2025 4.35  4.20 - 5.40 x10(6)/mcL Final    Hgb 08/01/2025 13.2  12.0 - 16.0 g/dL Final    Hct 08/01/2025 39.2  37.0 - 47.0 % Final    MCV 08/01/2025 90.1  80.0 - 94.0 fL Final    MCH 08/01/2025 30.3  27.0 - 31.0 pg Final    MCHC 08/01/2025 33.7  33.0 - 36.0 g/dL Final    RDW 08/01/2025 14.1  11.5 - 17.0 % Final    Platelet 08/01/2025 317  130 - 400 x10(3)/mcL Final    MPV 08/01/2025 9.8  7.4 - 10.4 fL Final    Neut % 08/01/2025 59.0  % Final    Lymph % 08/01/2025 34.4  % Final    Mono % 08/01/2025 5.5  % Final    Eos % 08/01/2025 0.6  % Final    Basophil % 08/01/2025 0.4  % Final    Imm Grans % 08/01/2025 0.1  % Final    Neut # 08/01/2025 4.25  2.1 - 9.2 x10(3)/mcL Final    Lymph # 08/01/2025 2.48  0.6 - 4.6 x10(3)/mcL Final    Mono # 08/01/2025 0.40  0.1 - 1.3 x10(3)/mcL Final    Eos # 08/01/2025 0.04  0 - 0.9 x10(3)/mcL Final    Baso # 08/01/2025 0.03  <=0.2 x10(3)/mcL Final    Imm Gran # 08/01/2025 0.01  0.00 - 0.04 x10(3)/mcL Final          Assessment:       1. Malignant neoplasm of upper-outer quadrant of right breast in female, estrogen receptor positive    2. Iron deficiency anemia secondary to inadequate dietary iron intake          Plan:       Patient with right breast cancer s/p bilateral mastectomies done 3/15/24. Multifocal breast cancer, mixed ductal and lobular, 26mm and 3mm, with +PNI, and 2/11 lymph nodes with ITC, ER and WI strongly positive and Her2 negative with Ki67 23%.  Per NCC[facility], recommended to have Oncotype, and score returned low risk 14.   No radiation recommended due to only isolated tumor cells in LN.  Patient is post-menopausal, JENNIFER age 40 for fibroids and endometriosis, and was on +HRT when she was diagnosed as post-menopausal since age 44. HRT stopped at diagnosis.     Started Femara on 4/26/24. Tolerating well.   CBC today is good, anemia resolved. CMP unremarkable. Iron studies remain normal.   Baseline DEXA on 6/17/24 with normal bone density.   Continue Calcium and Vitamin D3.   S/p bilateral breast reconstruction in July with Dr. David. S/p revision of the left breast  to remove large area of fat necrosis in January 2025.    Continue with surveillance visits every 3 months.     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 There are no clear guidelines for screening but suggested to start screening colonoscopy age 38 and continue every 5 years. She has not had one in several years. [facility]     She has known MARC. She has a h/o gastric sleeve surgery in 2020, so suspect this is the culprit.  Given InFeD 1500mg x 1 dose on 5/1/24.   Anemia remains resolved. Iron levels normal.     All questions answered at this time.      TAMIKA Valencia      Visit today included increased complexity associated with the care of the episodic problem related to aromatase inhibitors, also addressed and managed the longitudinal care of the  patient's chronic MARC and cancer history.

## 2025-08-31 DIAGNOSIS — C50.411 MALIGNANT NEOPLASM OF UPPER-OUTER QUADRANT OF RIGHT BREAST IN FEMALE, ESTROGEN RECEPTOR POSITIVE: ICD-10-CM

## 2025-08-31 DIAGNOSIS — Z17.0 MALIGNANT NEOPLASM OF UPPER-OUTER QUADRANT OF RIGHT BREAST IN FEMALE, ESTROGEN RECEPTOR POSITIVE: ICD-10-CM

## 2025-09-02 RX ORDER — LETROZOLE 2.5 MG/1
TABLET, FILM COATED ORAL
Qty: 90 TABLET | Refills: 0 | Status: SHIPPED | OUTPATIENT
Start: 2025-09-02

## 2025-09-03 DIAGNOSIS — M54.50 LOW BACK PAIN, UNSPECIFIED BACK PAIN LATERALITY, UNSPECIFIED CHRONICITY, UNSPECIFIED WHETHER SCIATICA PRESENT: Primary | ICD-10-CM

## (undated) DEVICE — SYS CLSR DERMABOND PRINEO 22CM

## (undated) DEVICE — SUT CTD VICRYL 0 UND BR

## (undated) DEVICE — TUBE FRAZIER 5MM 2FT SOFT TIP

## (undated) DEVICE — SUT VICRYL PLUS 2-0 CT1 18

## (undated) DEVICE — RESERVOIR JACKSON-PRATT 100CC

## (undated) DEVICE — DRAIN SURG HUBLESS 30CM 15FR

## (undated) DEVICE — SUT STRATAFIX MCRYL 27IN 2-0

## (undated) DEVICE — BOWL STERILE LG GRAD 32OZ

## (undated) DEVICE — CLIP LIGATING MEDIUM

## (undated) DEVICE — NDL ECLIPSE SAFETY 23G 1.5IN

## (undated) DEVICE — TUBING INFILTRATION SNG SPIKE

## (undated) DEVICE — CONTAINER SPECIMEN SCREW 4OZ

## (undated) DEVICE — COUNT NDL FOAM MAGNET 40COUNT

## (undated) DEVICE — SUPPORT ULNA NERVE PROTECTOR

## (undated) DEVICE — SPONGE LAP 18X18 PREWASHED

## (undated) DEVICE — GLOVE 6.0 PROTEXIS PI MICRO

## (undated) DEVICE — PAD ABDOMINAL STERILE 8X10IN

## (undated) DEVICE — PACK SPY-PHI DRUG DRAPE

## (undated) DEVICE — NDL HYPO REG 25G X 1 1/2

## (undated) DEVICE — DRAIN CHANNEL ROUND TRO 15FR

## (undated) DEVICE — GLOVE SENSICARE PI SURG 6

## (undated) DEVICE — BAG MEDI-PLAST DECANTER C-FLOW

## (undated) DEVICE — NDL SYR 10ML 18X1.5 LL BLUNT

## (undated) DEVICE — CORD BIPOLAR 12 FOOT

## (undated) DEVICE — BANDAGE GAUZE COT STRL 4.5X4.1

## (undated) DEVICE — COVER PROBE WITH BAND 6X96IN

## (undated) DEVICE — STAPLER SKIN PROXIMATE WIDE

## (undated) DEVICE — APPLICATOR CHLORAPREP ORN 26ML

## (undated) DEVICE — SUT VICRYL PLUS 0 CT1 18IN

## (undated) DEVICE — GAUZE DRAIN N WVN 6PLY 4X4IN

## (undated) DEVICE — CLOSURE SKIN STERI STRIP 1/2X4

## (undated) DEVICE — SUT VICRYL+ 1 CT1 18IN

## (undated) DEVICE — TUBE SUCTION MEDI-VAC STERILE

## (undated) DEVICE — GLOVE SENSICARE PI SURG 8

## (undated) DEVICE — MICRO CLIP

## (undated) DEVICE — BLANKET SNUGGLE WARM LOWER BDY

## (undated) DEVICE — TRAY CATH FOL SIL DRN BAG 16FR

## (undated) DEVICE — DRAPE MEDI-SLUSH 44X44IN

## (undated) DEVICE — SYR IRRIGATION BULB STER 60ML

## (undated) DEVICE — DRAPE OPMI STERILE

## (undated) DEVICE — Device

## (undated) DEVICE — GLOVE SENSICARE PI GRN 6.5

## (undated) DEVICE — SUT SILK 3-0 BLK BR SH 30IN

## (undated) DEVICE — PAD PREP CUFFED NS 24X48IN

## (undated) DEVICE — SUT STRATAFIX 3-0 30CM

## (undated) DEVICE — ELECTRODE PATIENT RETURN DISP

## (undated) DEVICE — CLIP LIGATING HEMOCLP SMALL

## (undated) DEVICE — TRAY NEURO OMC

## (undated) DEVICE — CARTRIDGE MICROCLIP SFINE BLUE

## (undated) DEVICE — SURGICAL BRA

## (undated) DEVICE — TUBE PAL ASPIR CONN STRL 12FT

## (undated) DEVICE — SPONGE GAUZE 16PLY 4X4

## (undated) DEVICE — BLADE EZ CLEAN 2 1/2

## (undated) DEVICE — COVER PROXIMA MAYO STAND

## (undated) DEVICE — CLIPPER BLADE MOD 4406 (CAREF)

## (undated) DEVICE — KIT SURGIFLO HEMOSTATIC MATRIX

## (undated) DEVICE — ELECTRODE BLADE INSULATED 1 IN

## (undated) DEVICE — DRAPE STERI INSTRUMENT 1018

## (undated) DEVICE — GLOVE SENSICARE PI SURG 6.5

## (undated) DEVICE — GOWN POLY REINF BRTH SLV XL

## (undated) DEVICE — YANKAUER FLEX NO VENT REG CAP

## (undated) DEVICE — COVER TABLE HVY DTY 60X90IN

## (undated) DEVICE — SOL NORMAL USPCA 0.9%

## (undated) DEVICE — TIP YANKAUERS BULB NO VENT

## (undated) DEVICE — APPLICATOR STRL COT 2INNR 6IN

## (undated) DEVICE — TOWEL OR DISP STRL BLUE 4/PK

## (undated) DEVICE — PENCIL ELECSURG ROCKER 15FT

## (undated) DEVICE — SOL NACL IRR 1000ML BTL

## (undated) DEVICE — CLAMP MICROVASCULAR DOUBLE

## (undated) DEVICE — MARKER SKIN STND TIP BLUE BARR

## (undated) DEVICE — SYR 30CC LUER LOCK

## (undated) DEVICE — SOL IRR NACL .9% 1000CC

## (undated) DEVICE — ELECTRODE REM PLYHSV RETURN 9

## (undated) DEVICE — SUT 9/0 5IN ETHILON BLK MON

## (undated) DEVICE — DRESSING MEPILEX BORDER 4 X 4

## (undated) DEVICE — DRAPE ORTH SPLIT 77X108IN

## (undated) DEVICE — MATERIAL GREEN BKGRND 50X2X1MM

## (undated) DEVICE — CLAMP BIOVER VEN MIC SGL 15G

## (undated) DEVICE — CLAMP BIOVER VEN SM SGL 20G/MM

## (undated) DEVICE — PENCIL SMOKE EVAC ROCKER 70MM

## (undated) DEVICE — DRESSING TRANS 4X4 TEGADERM

## (undated) DEVICE — DRESSING SURGICAL 1/2X1/2

## (undated) DEVICE — BLADE SURG STAINLESS STEEL #10

## (undated) DEVICE — DRAPE FULL SHEET 70X100IN

## (undated) DEVICE — CATH PROTECTIV IV 24GA .75IN

## (undated) DEVICE — DRAPE FLUID WARMER ORS 44X44IN

## (undated) DEVICE — NDL SAFETY 21G X 1 1/2 ECLPSE

## (undated) DEVICE — SUT SILK 2.0 BLK 18

## (undated) DEVICE — STRIP MEDI WND CLSR 1X5IN

## (undated) DEVICE — NDL SPINAL 18GX3.5 SPINOCAN

## (undated) DEVICE — NDL HYPO 27G X 1 1/2

## (undated) DEVICE — DRAPE ABDOMINAL TIBURON 14X11

## (undated) DEVICE — GOWN X-LG STERILE BACK

## (undated) DEVICE — GLOVE SENSICARE PI MICRO 7

## (undated) DEVICE — LIGATING CLIP LARGE

## (undated) DEVICE — BNDG COFLEX FOAM LF2 ST 3X5YD

## (undated) DEVICE — SUT MCRYL PLUS 3-0 PS2 27IN

## (undated) DEVICE — MARKER SKIN RULER AND LABEL

## (undated) DEVICE — PENCIL SMOKE EVAC TELSCP 15FT

## (undated) DEVICE — CLAMP BIOVER VEN MIC DLB 15G

## (undated) DEVICE — GLOVE SENSICARE PI MICRO 6

## (undated) DEVICE — SUT PROLENE 4-0 FS-2 BL MON

## (undated) DEVICE — DRAPE TOP 53X102IN

## (undated) DEVICE — DRESSING ABSRBNT ISLAND 3.6X8

## (undated) DEVICE — BINDER ABDOMINAL UNIV XLN 10IN

## (undated) DEVICE — CABLE EXT DOPPLER FLOW PROBE

## (undated) DEVICE — BLADE SURG STAINLESS STEEL #15

## (undated) DEVICE — SUTURE STRATAFIX PGA PCL 3-0

## (undated) DEVICE — SOL ELECTROLUBE ANTI-STIC

## (undated) DEVICE — PACK SCROTO-PAK LONE STAR

## (undated) DEVICE — ILLUMINATOR PHOTONBLADE 8/11IN

## (undated) DEVICE — DRESSING XEROFORM NONADH 1X8IN

## (undated) DEVICE — SOL IRRI STRL WATER 1000ML

## (undated) DEVICE — SPONGE COTTON TRAY 4X4IN

## (undated) DEVICE — KIT SPINAL PATIENT CARE JACK

## (undated) DEVICE — CLIP HORIZON LIG TI MED-LG

## (undated) DEVICE — BLANKET WARMING LOWER BODY

## (undated) DEVICE — DRAPE C-ARM ELAS CLIP 42X120IN

## (undated) DEVICE — DRAPE UTILITY W/ TAPE 20X30IN

## (undated) DEVICE — GEL AQUASONIC 100 STERILE20GM

## (undated) DEVICE — DRAPE MEDIUM SHEET 40X70IN

## (undated) DEVICE — DRAPE INCISE IOBAN 2 23X23IN

## (undated) DEVICE — TUBING SILICON CLR 3/16IN 10FT

## (undated) DEVICE — BUR BONE CUT MICRO TPS 3X3.8MM

## (undated) DEVICE — BLANKET HYPER ADULT 24X60IN

## (undated) DEVICE — SUT PDS PLUS 1 TP1 96IN

## (undated) DEVICE — NDL SURE-SNAP HYPO SAF 21GX1.5

## (undated) DEVICE — SET FLUID TRANSFER ASEPTIC

## (undated) DEVICE — DRAPE STERI-DRAPE 1000 17X11IN

## (undated) DEVICE — SUT CTD VICRYL 3-0 CR/SH

## (undated) DEVICE — DRAPE C-ARMOR EQUIPMENT COVER

## (undated) DEVICE — DRESSING AQUACEL FOAM 5 X 5